# Patient Record
Sex: FEMALE | Race: NATIVE HAWAIIAN OR OTHER PACIFIC ISLANDER | NOT HISPANIC OR LATINO | Employment: OTHER | URBAN - METROPOLITAN AREA
[De-identification: names, ages, dates, MRNs, and addresses within clinical notes are randomized per-mention and may not be internally consistent; named-entity substitution may affect disease eponyms.]

---

## 2021-09-14 ENCOUNTER — OFFICE VISIT (OUTPATIENT)
Dept: URGENT CARE | Facility: CLINIC | Age: 66
End: 2021-09-14
Payer: COMMERCIAL

## 2021-09-14 ENCOUNTER — HOSPITAL ENCOUNTER (OUTPATIENT)
Facility: MEDICAL CENTER | Age: 66
End: 2021-09-14
Attending: FAMILY MEDICINE
Payer: COMMERCIAL

## 2021-09-14 VITALS
WEIGHT: 145 LBS | RESPIRATION RATE: 14 BRPM | OXYGEN SATURATION: 98 % | TEMPERATURE: 97.3 F | HEIGHT: 61 IN | BODY MASS INDEX: 27.38 KG/M2 | DIASTOLIC BLOOD PRESSURE: 62 MMHG | SYSTOLIC BLOOD PRESSURE: 100 MMHG | HEART RATE: 85 BPM

## 2021-09-14 DIAGNOSIS — J32.9 RHINOSINUSITIS: ICD-10-CM

## 2021-09-14 PROCEDURE — 99203 OFFICE O/P NEW LOW 30 MIN: CPT | Performed by: FAMILY MEDICINE

## 2021-09-14 PROCEDURE — U0003 INFECTIOUS AGENT DETECTION BY NUCLEIC ACID (DNA OR RNA); SEVERE ACUTE RESPIRATORY SYNDROME CORONAVIRUS 2 (SARS-COV-2) (CORONAVIRUS DISEASE [COVID-19]), AMPLIFIED PROBE TECHNIQUE, MAKING USE OF HIGH THROUGHPUT TECHNOLOGIES AS DESCRIBED BY CMS-2020-01-R: HCPCS

## 2021-09-14 PROCEDURE — U0005 INFEC AGEN DETEC AMPLI PROBE: HCPCS

## 2021-09-14 RX ORDER — AMOXICILLIN AND CLAVULANATE POTASSIUM 875; 125 MG/1; MG/1
1 TABLET, FILM COATED ORAL 2 TIMES DAILY
Qty: 14 TABLET | Refills: 0 | Status: SHIPPED | OUTPATIENT
Start: 2021-09-14 | End: 2021-09-21

## 2021-09-14 ASSESSMENT — ENCOUNTER SYMPTOMS
EYE DISCHARGE: 0
DIARRHEA: 0
WEIGHT LOSS: 0
NAUSEA: 0
EYE REDNESS: 0
VOMITING: 0
MYALGIAS: 0

## 2021-09-14 NOTE — PROGRESS NOTES
"Subjective     Vicenta Saeed is a 66 y.o. female who presents with Sinusitis (x 2 week with headache and congestion.  Denies fever or chills.  Unvaccinated for Covid 19. )            2 weeks sinus pressure and drainage.  No fever.  PMH recurrent sinusitis.  No sinus surgeries.  Mild sore throat and cough due to drainage.  No shortness of breath or wheezing.  No loss of taste or smell.  No other aggravating or alleviating factors.      Review of Systems   Constitutional: Negative for malaise/fatigue and weight loss.   Eyes: Negative for discharge and redness.   Gastrointestinal: Negative for diarrhea, nausea and vomiting.   Musculoskeletal: Negative for joint pain and myalgias.   Skin: Negative for itching and rash.              Objective     /62   Pulse 85   Temp 36.3 °C (97.3 °F) (Temporal)   Resp 14   Ht 1.549 m (5' 1\")   Wt 65.8 kg (145 lb)   LMP  (LMP Unknown)   SpO2 98%   BMI 27.40 kg/m²      Physical Exam  Constitutional:       General: She is not in acute distress.     Appearance: She is well-developed.   HENT:      Head: Normocephalic and atraumatic.      Nose: Congestion present.      Comments: Area of perceived pressure frontal and maxillary sinus.  PND.  Eyes:      Conjunctiva/sclera: Conjunctivae normal.   Cardiovascular:      Rate and Rhythm: Normal rate and regular rhythm.      Heart sounds: Normal heart sounds. No murmur heard.     Pulmonary:      Effort: Pulmonary effort is normal.      Breath sounds: Normal breath sounds. No wheezing.   Skin:     General: Skin is warm and dry.      Findings: No rash.   Neurological:      Mental Status: She is alert and oriented to person, place, and time.                             Assessment & Plan         1. Rhinosinusitis  amoxicillin-clavulanate (AUGMENTIN) 875-125 MG Tab    COVID/SARS CoV-2 PCR       Differential diagnosis, natural history, supportive care, and indications for immediate follow-up discussed at length.     Nasal saline " decongestant, nasal CS    F/u c19 testing

## 2021-09-15 DIAGNOSIS — J32.9 RHINOSINUSITIS: ICD-10-CM

## 2021-09-15 LAB
COVID ORDER STATUS COVID19: NORMAL
SARS-COV-2 RNA RESP QL NAA+PROBE: NOTDETECTED
SPECIMEN SOURCE: NORMAL

## 2021-09-23 ENCOUNTER — OFFICE VISIT (OUTPATIENT)
Dept: URGENT CARE | Facility: CLINIC | Age: 66
End: 2021-09-23
Payer: COMMERCIAL

## 2021-09-23 ENCOUNTER — HOSPITAL ENCOUNTER (OUTPATIENT)
Facility: MEDICAL CENTER | Age: 66
End: 2021-09-23
Attending: PHYSICIAN ASSISTANT
Payer: COMMERCIAL

## 2021-09-23 VITALS
RESPIRATION RATE: 20 BRPM | TEMPERATURE: 98.3 F | BODY MASS INDEX: 27.56 KG/M2 | WEIGHT: 146 LBS | HEART RATE: 65 BPM | SYSTOLIC BLOOD PRESSURE: 118 MMHG | HEIGHT: 61 IN | DIASTOLIC BLOOD PRESSURE: 80 MMHG | OXYGEN SATURATION: 97 %

## 2021-09-23 DIAGNOSIS — Z11.52 ENCOUNTER FOR SCREENING FOR COVID-19: ICD-10-CM

## 2021-09-23 PROCEDURE — 99213 OFFICE O/P EST LOW 20 MIN: CPT | Mod: CS | Performed by: PHYSICIAN ASSISTANT

## 2021-09-23 PROCEDURE — U0003 INFECTIOUS AGENT DETECTION BY NUCLEIC ACID (DNA OR RNA); SEVERE ACUTE RESPIRATORY SYNDROME CORONAVIRUS 2 (SARS-COV-2) (CORONAVIRUS DISEASE [COVID-19]), AMPLIFIED PROBE TECHNIQUE, MAKING USE OF HIGH THROUGHPUT TECHNOLOGIES AS DESCRIBED BY CMS-2020-01-R: HCPCS

## 2021-09-23 ASSESSMENT — ENCOUNTER SYMPTOMS
SORE THROAT: 0
MYALGIAS: 0
PALPITATIONS: 0
VOMITING: 0
COUGH: 0
CHILLS: 0
DIZZINESS: 0
FEVER: 0
NAUSEA: 0
HEADACHES: 0
ABDOMINAL PAIN: 0

## 2021-09-23 NOTE — PROGRESS NOTES
"Subjective:   Vicenta Saeed is a 66 y.o. female who presents for Coronavirus Screening (leaving to [facility] needs test by Tues)      HPI:  This is a very pleasant 66-year-old female presenting to the clinic for coronavirus screening.  Patient states she is leaving to fly to [facility] to see her family early Tuesday morning.  She has not seen her family in over 30 years.  [employer] are requesting she is tested for Covid via PCR test 72 hours prior to her flight.  Currently she is not experiencing any symptoms.  She has had no known contacts.  She is not vaccinated for COVID-19.    Review of Systems   Constitutional: Negative for chills, fever and malaise/fatigue.   HENT: Negative for congestion and sore throat.    Respiratory: Negative for cough.    Cardiovascular: Negative for chest pain and palpitations.   Gastrointestinal: Negative for abdominal pain, nausea and vomiting.   Musculoskeletal: Negative for myalgias.   Neurological: Negative for dizziness and headaches.       Medications:    • This patient does not have an active medication from one of the medication groupers.    Allergies: Patient has no known allergies.    Problem List: Vicenta Saeed does not have a problem list on file.    Surgical History:  No past surgical history on file.    Past Social Hx: Vicenta Saeed  reports that she has never smoked. She has never used smokeless tobacco. She reports current alcohol use. She reports that she does not use drugs.     Past Family Hx:  Vicenta Saeed family history is not on file.     Problem list, medications, and allergies reviewed by myself today in Epic.     Objective:     /80 (BP Location: Left arm, Patient Position: Sitting, BP Cuff Size: Adult long)   Pulse 65   Temp 36.8 °C (98.3 °F) (Temporal)   Resp 20   Ht 1.549 m (5' 1\")   Wt 66.2 kg (146 lb)   LMP  (LMP Unknown)   SpO2 97%   BMI 27.59 kg/m²     Physical Exam  Constitutional:       General: She is not in acute distress.     " Appearance: Normal appearance. She is not ill-appearing, toxic-appearing or diaphoretic.   HENT:      Head: Normocephalic and atraumatic.   Eyes:      Conjunctiva/sclera: Conjunctivae normal.   Cardiovascular:      Rate and Rhythm: Normal rate and regular rhythm.      Pulses: Normal pulses.      Heart sounds: Normal heart sounds.   Pulmonary:      Effort: Pulmonary effort is normal.      Breath sounds: Normal breath sounds.   Neurological:      General: No focal deficit present.      Mental Status: She is alert and oriented to person, place, and time. Mental status is at baseline.           Assessment/Plan:     Comments/MDM:     • Placed an order for the patient to return to the clinic this Saturday for a nonprovider visit and Covid testing.  This should give her enough time to have her result back before she flies out Tuesday morning.  Testing was not performed today.  Instructed the patient she needs to download my chart in order to have results available.     Diagnosis and associated orders:     1. Encounter for screening for COVID-19  COVID/SARS CoV-2 PCR              Differential diagnosis, natural history, supportive care, and indications for immediate follow-up discussed.    Advised the patient to follow-up with the primary care physician for recheck, reevaluation, and consideration of further management.    Please note that this dictation was created using voice recognition software. I have made reasonable attempt to correct obvious errors, but I expect that there are errors of grammar and possibly content that I did not discover before finalizing the note.    This note was electronically signed by HAMILTON Villalobos PA-C

## 2021-09-25 ENCOUNTER — OFFICE VISIT (OUTPATIENT)
Dept: URGENT CARE | Facility: CLINIC | Age: 66
End: 2021-09-25
Payer: MEDICAID

## 2021-09-25 ENCOUNTER — NON-PROVIDER VISIT (OUTPATIENT)
Dept: URGENT CARE | Facility: CLINIC | Age: 66
End: 2021-09-25

## 2021-09-26 DIAGNOSIS — Z11.52 ENCOUNTER FOR SCREENING FOR COVID-19: ICD-10-CM

## 2021-09-26 LAB — COVID ORDER STATUS COVID19: NORMAL

## 2021-09-27 LAB
SARS-COV-2 RNA RESP QL NAA+PROBE: NOTDETECTED
SPECIMEN SOURCE: NORMAL

## 2021-10-29 ENCOUNTER — HOSPITAL ENCOUNTER (INPATIENT)
Facility: MEDICAL CENTER | Age: 66
LOS: 14 days | DRG: 177 | End: 2021-11-13
Attending: EMERGENCY MEDICINE | Admitting: STUDENT IN AN ORGANIZED HEALTH CARE EDUCATION/TRAINING PROGRAM
Payer: COMMERCIAL

## 2021-10-29 ENCOUNTER — OFFICE VISIT (OUTPATIENT)
Dept: URGENT CARE | Facility: CLINIC | Age: 66
End: 2021-10-29
Payer: COMMERCIAL

## 2021-10-29 ENCOUNTER — APPOINTMENT (OUTPATIENT)
Dept: RADIOLOGY | Facility: MEDICAL CENTER | Age: 66
DRG: 177 | End: 2021-10-29
Attending: EMERGENCY MEDICINE
Payer: COMMERCIAL

## 2021-10-29 VITALS
BODY MASS INDEX: 26.13 KG/M2 | WEIGHT: 142 LBS | TEMPERATURE: 100.7 F | DIASTOLIC BLOOD PRESSURE: 64 MMHG | OXYGEN SATURATION: 89 % | SYSTOLIC BLOOD PRESSURE: 108 MMHG | RESPIRATION RATE: 20 BRPM | HEART RATE: 93 BPM | HEIGHT: 62 IN

## 2021-10-29 DIAGNOSIS — U07.1 PNEUMONIA DUE TO COVID-19 VIRUS: ICD-10-CM

## 2021-10-29 DIAGNOSIS — K21.9 GASTROESOPHAGEAL REFLUX DISEASE, UNSPECIFIED WHETHER ESOPHAGITIS PRESENT: ICD-10-CM

## 2021-10-29 DIAGNOSIS — I95.89 HYPOTENSION DUE TO HYPOVOLEMIA: ICD-10-CM

## 2021-10-29 DIAGNOSIS — J12.82 PNEUMONIA DUE TO COVID-19 VIRUS: ICD-10-CM

## 2021-10-29 DIAGNOSIS — J96.01 ACUTE RESPIRATORY FAILURE WITH HYPOXIA (HCC): ICD-10-CM

## 2021-10-29 DIAGNOSIS — E86.1 HYPOTENSION DUE TO HYPOVOLEMIA: ICD-10-CM

## 2021-10-29 DIAGNOSIS — E55.9 VITAMIN D DEFICIENCY: ICD-10-CM

## 2021-10-29 DIAGNOSIS — E83.51 HYPOCALCEMIA: ICD-10-CM

## 2021-10-29 DIAGNOSIS — U07.1 COVID: ICD-10-CM

## 2021-10-29 LAB
ALBUMIN SERPL BCP-MCNC: 4.1 G/DL (ref 3.2–4.9)
ALBUMIN/GLOB SERPL: 1.2 G/DL
ALP SERPL-CCNC: 64 U/L (ref 30–99)
ALT SERPL-CCNC: 30 U/L (ref 2–50)
ANION GAP SERPL CALC-SCNC: 13 MMOL/L (ref 7–16)
ANISOCYTOSIS BLD QL SMEAR: ABNORMAL
AST SERPL-CCNC: 51 U/L (ref 12–45)
BASOPHILS # BLD AUTO: 0 % (ref 0–1.8)
BASOPHILS # BLD: 0 K/UL (ref 0–0.12)
BILIRUB SERPL-MCNC: 0.3 MG/DL (ref 0.1–1.5)
BUN SERPL-MCNC: 12 MG/DL (ref 8–22)
CALCIUM SERPL-MCNC: 8.5 MG/DL (ref 8.5–10.5)
CHLORIDE SERPL-SCNC: 98 MMOL/L (ref 96–112)
CO2 SERPL-SCNC: 24 MMOL/L (ref 20–33)
CREAT SERPL-MCNC: 0.96 MG/DL (ref 0.5–1.4)
CRP SERPL HS-MCNC: 1.93 MG/DL (ref 0–0.75)
D DIMER PPP IA.FEU-MCNC: 1.19 UG/ML (FEU) (ref 0–0.5)
EKG IMPRESSION: NORMAL
EOSINOPHIL # BLD AUTO: 0 K/UL (ref 0–0.51)
EOSINOPHIL NFR BLD: 0 % (ref 0–6.9)
ERYTHROCYTE [DISTWIDTH] IN BLOOD BY AUTOMATED COUNT: 43.3 FL (ref 35.9–50)
EXTERNAL QUALITY CONTROL: ABNORMAL
GLOBULIN SER CALC-MCNC: 3.4 G/DL (ref 1.9–3.5)
GLUCOSE SERPL-MCNC: 108 MG/DL (ref 65–99)
HCT VFR BLD AUTO: 41.2 % (ref 37–47)
HGB BLD-MCNC: 13.6 G/DL (ref 12–16)
LYMPHOCYTES # BLD AUTO: 0.69 K/UL (ref 1–4.8)
LYMPHOCYTES NFR BLD: 15.3 % (ref 22–41)
MANUAL DIFF BLD: NORMAL
MCH RBC QN AUTO: 29.7 PG (ref 27–33)
MCHC RBC AUTO-ENTMCNC: 33 G/DL (ref 33.6–35)
MCV RBC AUTO: 90 FL (ref 81.4–97.8)
MICROCYTES BLD QL SMEAR: ABNORMAL
MONOCYTES # BLD AUTO: 0.36 K/UL (ref 0–0.85)
MONOCYTES NFR BLD AUTO: 8.1 % (ref 0–13.4)
MORPHOLOGY BLD-IMP: NORMAL
MYELOCYTES NFR BLD MANUAL: 0.9 %
NEUTROPHILS # BLD AUTO: 3.41 K/UL (ref 2–7.15)
NEUTROPHILS NFR BLD: 75.7 % (ref 44–72)
NRBC # BLD AUTO: 0 K/UL
NRBC BLD-RTO: 0 /100 WBC
NT-PROBNP SERPL IA-MCNC: 105 PG/ML (ref 0–125)
PLATELET # BLD AUTO: 149 K/UL (ref 164–446)
PLATELET BLD QL SMEAR: NORMAL
PMV BLD AUTO: 10.2 FL (ref 9–12.9)
POTASSIUM SERPL-SCNC: 3.8 MMOL/L (ref 3.6–5.5)
PROCALCITONIN SERPL-MCNC: <0.05 NG/ML
PROT SERPL-MCNC: 7.5 G/DL (ref 6–8.2)
RBC # BLD AUTO: 4.58 M/UL (ref 4.2–5.4)
RBC BLD AUTO: PRESENT
SARS-COV+SARS-COV-2 AG RESP QL IA.RAPID: POSITIVE
SODIUM SERPL-SCNC: 135 MMOL/L (ref 135–145)
TROPONIN T SERPL-MCNC: 9 NG/L (ref 6–19)
WBC # BLD AUTO: 4.5 K/UL (ref 4.8–10.8)

## 2021-10-29 PROCEDURE — 99213 OFFICE O/P EST LOW 20 MIN: CPT | Mod: CS | Performed by: PHYSICIAN ASSISTANT

## 2021-10-29 PROCEDURE — 99285 EMERGENCY DEPT VISIT HI MDM: CPT

## 2021-10-29 PROCEDURE — 86140 C-REACTIVE PROTEIN: CPT

## 2021-10-29 PROCEDURE — 85379 FIBRIN DEGRADATION QUANT: CPT

## 2021-10-29 PROCEDURE — 87426 SARSCOV CORONAVIRUS AG IA: CPT | Performed by: PHYSICIAN ASSISTANT

## 2021-10-29 PROCEDURE — 80053 COMPREHEN METABOLIC PANEL: CPT

## 2021-10-29 PROCEDURE — 71045 X-RAY EXAM CHEST 1 VIEW: CPT

## 2021-10-29 PROCEDURE — 93005 ELECTROCARDIOGRAM TRACING: CPT

## 2021-10-29 PROCEDURE — 83880 ASSAY OF NATRIURETIC PEPTIDE: CPT

## 2021-10-29 PROCEDURE — 85007 BL SMEAR W/DIFF WBC COUNT: CPT

## 2021-10-29 PROCEDURE — 84145 PROCALCITONIN (PCT): CPT

## 2021-10-29 PROCEDURE — 93005 ELECTROCARDIOGRAM TRACING: CPT | Performed by: EMERGENCY MEDICINE

## 2021-10-29 PROCEDURE — 85027 COMPLETE CBC AUTOMATED: CPT

## 2021-10-29 PROCEDURE — 84484 ASSAY OF TROPONIN QUANT: CPT

## 2021-10-29 RX ORDER — DEXAMETHASONE 4 MG/1
6 TABLET ORAL DAILY
Status: DISCONTINUED | OUTPATIENT
Start: 2021-10-29 | End: 2021-10-30

## 2021-10-29 ASSESSMENT — ENCOUNTER SYMPTOMS
VOMITING: 0
ABDOMINAL PAIN: 0
NAUSEA: 0
HEADACHES: 1
DIARRHEA: 0
MYALGIAS: 1
CHILLS: 1
FEVER: 1
SHORTNESS OF BREATH: 1
EYE PAIN: 0
CONSTIPATION: 0
COUGH: 1
SORE THROAT: 1

## 2021-10-29 NOTE — LETTER
October 29, 2021    To Whom It May Concern:         This is confirmation that Vicenta Saeed attended her scheduled appointment with Karthik Peña P.A.-C. on 10/29/21.  Your employee was seen in our clinic today and had a POSITIVE Covid-19 test.    Since the results of testing are positive, your employee should follow the CDC guidelines.  These are isolation for a minimum of 10 days and at least 24 hours have passed since your last fever without the use of fever-reducing medications and all other symptoms have improved.  The health department may contact you and provide further directions regarding self-isolation and return to work.     This is the only note that will be provided from [facility] for this visit.  Your employee will require an appointment with a primary care provider if FMLA or disability forms are required.  If you have any questions please do not hesitate to call me at the phone number listed below.    Sincerely,    Karthik Peña P.A.-C.  [phone]

## 2021-10-29 NOTE — PROGRESS NOTES
"Subjective:   Vicenta Saeed is a 66 y.o. female who presents for Cough (x Sunday, dizzy, cough, SOB)      HPI  66 years old female presents to urgent care for 6 days of progressive cough, dysnpea, headache, malaise, fevers/chills.  No history of covid vaccination. No known covid positive contacts.     Review of Systems   Constitutional: Positive for chills, fever and malaise/fatigue.   HENT: Positive for congestion and sore throat. Negative for ear pain.    Eyes: Negative for pain.   Respiratory: Positive for cough and shortness of breath.    Cardiovascular: Negative for chest pain.   Gastrointestinal: Negative for abdominal pain, constipation, diarrhea, nausea and vomiting.   Genitourinary: Negative for dysuria.   Musculoskeletal: Positive for myalgias.   Skin: Negative for rash.   Neurological: Positive for headaches.       Medications, Allergies, and current problem list reviewed today in Epic.     Objective:     /64 (BP Location: Left arm, Patient Position: Sitting, BP Cuff Size: Adult long)   Pulse 93   Temp (!) 38.2 °C (100.7 °F) (Temporal)   Resp 20   Ht 1.562 m (5' 1.5\")   Wt 64.4 kg (142 lb)   SpO2 89%     Physical Exam  Vitals reviewed.   Constitutional:       Appearance: Normal appearance. She is ill-appearing. She is not toxic-appearing.   HENT:      Head: Normocephalic and atraumatic.      Right Ear: External ear normal.      Left Ear: External ear normal.      Nose: Nose normal.      Mouth/Throat:      Mouth: Mucous membranes are moist.   Eyes:      Conjunctiva/sclera: Conjunctivae normal.   Cardiovascular:      Rate and Rhythm: Normal rate and regular rhythm.   Pulmonary:      Effort: Pulmonary effort is normal.      Breath sounds: Rales (bibasilar) present.   Skin:     General: Skin is warm and dry.      Capillary Refill: Capillary refill takes less than 2 seconds.   Neurological:      Mental Status: She is alert and oriented to person, place, and time.         Assessment/Plan: "     Diagnosis and associated orders:     1. COVID  POCT SARS-COV Antigen JIMBO (Symptomatic Only)      Comments/MDM:     • covid positive in clinic  • Rechecked oxygen which wavered between 84-91%.    • Discussed management and offered er evaluation for further workup and consideration of O2 therapy, patient would like to be seen in the er  • Provided work note and instructions if unable to be seen  • Advised pulse oximetry and presentation if resting oximetry <=89%         Differential diagnosis, natural history, supportive care, and indications for immediate follow-up discussed.    Advised the patient to follow-up with the primary care physician for recheck, reevaluation, and consideration of further management.    Please note that this dictation was created using voice recognition software. I have made a reasonable attempt to correct obvious errors, but I expect that there are errors of grammar and possibly content that I did not discover before finalizing the note.    This note was electronically signed by Karthik Peña PA-C

## 2021-10-30 PROBLEM — E86.1 HYPOTENSION DUE TO HYPOVOLEMIA: Status: ACTIVE | Noted: 2021-10-30

## 2021-10-30 PROBLEM — I95.89 HYPOTENSION DUE TO HYPOVOLEMIA: Status: ACTIVE | Noted: 2021-10-30

## 2021-10-30 PROBLEM — J12.82 PNEUMONIA DUE TO COVID-19 VIRUS: Status: ACTIVE | Noted: 2021-10-30

## 2021-10-30 PROBLEM — U07.1 PNEUMONIA DUE TO COVID-19 VIRUS: Status: ACTIVE | Noted: 2021-10-30

## 2021-10-30 PROBLEM — J96.01 ACUTE RESPIRATORY FAILURE WITH HYPOXIA (HCC): Status: ACTIVE | Noted: 2021-10-30

## 2021-10-30 LAB — LACTATE BLD-SCNC: 1 MMOL/L (ref 0.5–2)

## 2021-10-30 PROCEDURE — 87040 BLOOD CULTURE FOR BACTERIA: CPT

## 2021-10-30 PROCEDURE — 96372 THER/PROPH/DIAG INJ SC/IM: CPT

## 2021-10-30 PROCEDURE — 770001 HCHG ROOM/CARE - MED/SURG/GYN PRIV*

## 2021-10-30 PROCEDURE — 700102 HCHG RX REV CODE 250 W/ 637 OVERRIDE(OP): Performed by: STUDENT IN AN ORGANIZED HEALTH CARE EDUCATION/TRAINING PROGRAM

## 2021-10-30 PROCEDURE — A9270 NON-COVERED ITEM OR SERVICE: HCPCS | Performed by: STUDENT IN AN ORGANIZED HEALTH CARE EDUCATION/TRAINING PROGRAM

## 2021-10-30 PROCEDURE — 99223 1ST HOSP IP/OBS HIGH 75: CPT | Performed by: STUDENT IN AN ORGANIZED HEALTH CARE EDUCATION/TRAINING PROGRAM

## 2021-10-30 PROCEDURE — 700111 HCHG RX REV CODE 636 W/ 250 OVERRIDE (IP): Performed by: STUDENT IN AN ORGANIZED HEALTH CARE EDUCATION/TRAINING PROGRAM

## 2021-10-30 PROCEDURE — 700105 HCHG RX REV CODE 258: Performed by: STUDENT IN AN ORGANIZED HEALTH CARE EDUCATION/TRAINING PROGRAM

## 2021-10-30 PROCEDURE — 83605 ASSAY OF LACTIC ACID: CPT

## 2021-10-30 PROCEDURE — 36415 COLL VENOUS BLD VENIPUNCTURE: CPT

## 2021-10-30 RX ORDER — SODIUM CHLORIDE, SODIUM LACTATE, POTASSIUM CHLORIDE, CALCIUM CHLORIDE 600; 310; 30; 20 MG/100ML; MG/100ML; MG/100ML; MG/100ML
INJECTION, SOLUTION INTRAVENOUS CONTINUOUS
Status: DISCONTINUED | OUTPATIENT
Start: 2021-10-30 | End: 2021-10-30

## 2021-10-30 RX ORDER — SODIUM CHLORIDE 9 MG/ML
500 INJECTION, SOLUTION INTRAVENOUS ONCE
Status: COMPLETED | OUTPATIENT
Start: 2021-10-30 | End: 2021-10-30

## 2021-10-30 RX ORDER — DEXAMETHASONE 6 MG/1
6 TABLET ORAL DAILY
Status: COMPLETED | OUTPATIENT
Start: 2021-10-30 | End: 2021-11-08

## 2021-10-30 RX ORDER — BENZONATATE 100 MG/1
100 CAPSULE ORAL 3 TIMES DAILY PRN
Status: DISCONTINUED | OUTPATIENT
Start: 2021-10-30 | End: 2021-11-13 | Stop reason: HOSPADM

## 2021-10-30 RX ORDER — ACETAMINOPHEN 325 MG/1
650 TABLET ORAL EVERY 6 HOURS PRN
Status: DISCONTINUED | OUTPATIENT
Start: 2021-10-30 | End: 2021-11-13 | Stop reason: HOSPADM

## 2021-10-30 RX ORDER — IPRATROPIUM BROMIDE AND ALBUTEROL SULFATE 2.5; .5 MG/3ML; MG/3ML
3 SOLUTION RESPIRATORY (INHALATION)
Status: DISCONTINUED | OUTPATIENT
Start: 2021-10-30 | End: 2021-11-13 | Stop reason: HOSPADM

## 2021-10-30 RX ADMIN — ENOXAPARIN SODIUM 40 MG: 40 INJECTION SUBCUTANEOUS at 05:58

## 2021-10-30 RX ADMIN — SODIUM CHLORIDE 500 ML: 9 INJECTION, SOLUTION INTRAVENOUS at 09:53

## 2021-10-30 RX ADMIN — DEXAMETHASONE 6 MG: 4 TABLET ORAL at 05:58

## 2021-10-30 RX ADMIN — SODIUM CHLORIDE, POTASSIUM CHLORIDE, SODIUM LACTATE AND CALCIUM CHLORIDE: 600; 310; 30; 20 INJECTION, SOLUTION INTRAVENOUS at 00:44

## 2021-10-30 ASSESSMENT — PATIENT HEALTH QUESTIONNAIRE - PHQ9
SUM OF ALL RESPONSES TO PHQ9 QUESTIONS 1 AND 2: 0
2. FEELING DOWN, DEPRESSED, IRRITABLE, OR HOPELESS: NOT AT ALL
1. LITTLE INTEREST OR PLEASURE IN DOING THINGS: NOT AT ALL

## 2021-10-30 ASSESSMENT — ENCOUNTER SYMPTOMS
BLURRED VISION: 0
NERVOUS/ANXIOUS: 0
DIZZINESS: 1
ABDOMINAL PAIN: 0
DIARRHEA: 0
COUGH: 1
CONSTIPATION: 0
WEAKNESS: 0
WHEEZING: 0
FEVER: 0
VOMITING: 0
ORTHOPNEA: 0
SHORTNESS OF BREATH: 1
SORE THROAT: 0
CHILLS: 1
FOCAL WEAKNESS: 0
DEPRESSION: 0
FEVER: 1
HEADACHES: 0
NAUSEA: 0
HEMOPTYSIS: 0
HEADACHES: 1
DIZZINESS: 0
WEIGHT LOSS: 0
MYALGIAS: 0
DOUBLE VISION: 0
PALPITATIONS: 0
BLOOD IN STOOL: 0
EYE PAIN: 0
LOSS OF CONSCIOUSNESS: 0

## 2021-10-30 ASSESSMENT — COGNITIVE AND FUNCTIONAL STATUS - GENERAL
DAILY ACTIVITIY SCORE: 24
SUGGESTED CMS G CODE MODIFIER MOBILITY: CH
MOBILITY SCORE: 24
SUGGESTED CMS G CODE MODIFIER DAILY ACTIVITY: CH

## 2021-10-30 ASSESSMENT — PAIN DESCRIPTION - PAIN TYPE: TYPE: CHRONIC PAIN;ACUTE PAIN

## 2021-10-30 NOTE — DISCHARGE PLANNING
Agency/Facility Name: [facility]  Spoke To: Justen  Outcome: Notified to hold off on 02 referral.

## 2021-10-30 NOTE — FACE TO FACE
"Face to Face Note  -  Durable Medical Equipment    Addie Madrigal M.D. - NPI: [id number]  I certify that this patient is under my care and that they had a durable medical equipment(DME)face to face encounter by myself that meets the physician DME face-to-face encounter requirements with this patient on:    Date of encounter:   Patient:                    MRN:                       YOB: 2023  Vicenta Saeed  [id number]  1955     The encounter with the patient was in whole, or in part, for the following medical condition, which is the primary reason for durable medical equipment:  Covid-19 Infection    I certify that, based on my findings, the following durable medical equipment is medically necessary:  Oxygen.    HOME O2 Saturation Measurements:(Values must be present for Home Oxygen orders)  Room air sat at rest: 87  Room air sat with amb: 82  With liters of O2: 3, O2 sat at rest with O2: 92  With Liters of O2: 3, O2 sat with amb with O2 : 88  Is the patient mobile?: Yes    My Clinical findings support the need for the above equipment due to:  Hypoxia    Supporting Symptoms: The patient requires supplemental oxygen, as the following interventions have been tried with limited or no improvement: \"Oral and/or IV steroids, \"Ambulation with oximetry and \"Incentive spirometry    If patient feels more short of breath, they can go up to 6 liters per minute and contact healthcare provider.  "

## 2021-10-30 NOTE — ED NOTES
Pt given breakfast tray.    Pt ambulates to BR and back without O2- 82% RA noted on return to room. Replace NC at 3L

## 2021-10-30 NOTE — ED PROVIDER NOTES
"ED Provider Note    CHIEF COMPLAINT  Chief Complaint   Patient presents with   • Flu Like Symptoms     onset 10/25/2021 - weakness, dizziness, cough, fever       HPI  Vicenta Saeed is a 66 y.o. female who presents with symptoms consistent with COVID.  Stated on Saturday when she was in [facility].  She states she got tested on Sunday and felt worsening shortness of breath and it was supposedly negative.  She then proceeded to fly back home here in [place]. She has felt worse and is here today for further checkup. She denies any prior cardiopulmonary disease history. No known recent sick contacts. She states that she is otherwise healthy.    REVIEW OF SYSTEMS  See HPI for further details. All other systems are negative.     PAST MEDICAL HISTORY       SOCIAL HISTORY  Social History     Tobacco Use   • Smoking status: Never Smoker   • Smokeless tobacco: Never Used   Vaping Use   • Vaping Use: Never used   Substance and Sexual Activity   • Alcohol use: Not Currently     Comment: rare   • Drug use: Never   • Sexual activity: Not on file       SURGICAL HISTORY  patient denies any surgical history    CURRENT MEDICATIONS  Home Medications     Reviewed by Melina Alexander R.N. (Registered Nurse) on 10/29/21 at 1730  Med List Status: Partial   Medication Last Dose Status        Patient Enoch Taking any Medications                       ALLERGIES  No Known Allergies    PHYSICAL EXAM  VITAL SIGNS: BP (!) 99/68   Pulse 88   Temp 37.7 °C (99.8 °F) (Temporal)   Resp 18   Ht 1.562 m (5' 1.5\")   Wt 66.7 kg (147 lb)   LMP  (LMP Unknown)   SpO2 93%   BMI 27.33 kg/m²   Pulse ox interpretation: I interpret this pulse ox as normal.  Constitutional: Alert in no apparent distress.  HENT: No signs of trauma, Bilateral external ears normal, Nose normal.   Eyes: Conjunctiva normal, Non-icteric.   Neck: Normal range of motion, Supple, No stridor.   Cardiovascular: Regular rate and rhythm.   Thorax & Lungs: Normal breath sounds, No " respiratory distress, No wheezing.   Abdomen: Bowel sounds normal, Soft, No masses, No pulsatile masses. No peritoneal signs.  Skin: Warm, Dry, No erythema, No rash.   Extremities: Intact distal pulses, No edema, No cyanosis  Musculoskeletal: Good range of motion in all major joints. No major deformities noted.   Neurologic: Alert, No focal deficits noted.       DIAGNOSTIC STUDIES / PROCEDURES    EKG - Physician interpretation  Results for orders placed or performed during the hospital encounter of 10/29/21   EKG   Result Value Ref Range    Report       [facility] Emergency Dept.    Test Date:  2021-10-29  Pt Name:    LAUREN SAEZ              Department: ER  MRN:        [id number]                      Room:  Gender:     Female                       Technician: [id number]  :        1955                   Requested By:ER TRIAGE PROTOCOL  Order #:    [id number]                    Reading MD: SAY BUNDY MD    Measurements  Intervals                                Axis  Rate:       89                           P:          -16  MS:         160                          QRS:        -3  QRSD:       86                           T:          -20  QT:         360  QTc:        439    Interpretive Statements  SINUS RHYTHM  NONSPECIFIC T ABNORMALITIES, INFERIOR LEADS  No previous ECG available for comparison  Electronically Signed On 10- 21:36:39 PDT by SAY BUNDY MD         LABS  Labs Reviewed   CBC WITH DIFFERENTIAL - Abnormal; Notable for the following components:       Result Value    WBC 4.5 (*)     MCHC 33.0 (*)     Platelet Count 149 (*)     Neutrophils-Polys 75.70 (*)     Lymphocytes 15.30 (*)     Lymphs (Absolute) 0.69 (*)     All other components within normal limits    Narrative:     Enhanced Droplet, Contact, and Eye Protection   COMP METABOLIC PANEL - Abnormal; Notable for the following components:    Glucose 108 (*)     AST(SGOT) 51 (*)     All other components within normal limits     "Narrative:     Enhanced Droplet, Contact, and Eye Protection   CRP QUANTITIVE (NON-CARDIAC) - Abnormal; Notable for the following components:    Stat C-Reactive Protein 1.93 (*)     All other components within normal limits    Narrative:     Enhanced Droplet, Contact, and Eye Protection   D-DIMER - Abnormal; Notable for the following components:    D-Dimer Screen 1.19 (*)     All other components within normal limits    Narrative:     Enhanced Droplet, Contact, and Eye Protection   ESTIMATED GFR - Abnormal; Notable for the following components:    GFR If Non  58 (*)     All other components within normal limits    Narrative:     Enhanced Droplet, Contact, and Eye Protection   TROPONIN    Narrative:     Enhanced Droplet, Contact, and Eye Protection   PROBRAIN NATRIURETIC PEPTIDE, NT    Narrative:     Enhanced Droplet, Contact, and Eye Protection   PROCALCITONIN    Narrative:     Enhanced Droplet, Contact, and Eye Protection   PERIPHERAL SMEAR REVIEW    Narrative:     Enhanced Droplet, Contact, and Eye Protection   PLATELET ESTIMATE    Narrative:     Enhanced Droplet, Contact, and Eye Protection   MORPHOLOGY    Narrative:     Enhanced Droplet, Contact, and Eye Protection   DIFFERENTIAL MANUAL    Narrative:     Enhanced Droplet, Contact, and Eye Protection   BLOOD CULTURE    Narrative:     Enhanced Droplet, Contact, and Eye Protection  Per Hospital Policy: Only change Specimen Src: to \"Line\" if  specified by physician order.  2 sets of blood cultures received with no site and same time of  collection. Bottles may not be correctly paired.   BLOOD CULTURE    Narrative:     Enhanced Droplet, Contact, and Eye Protection  Per Hospital Policy: Only change Specimen Src: to \"Line\" if  specified by physician order.   LACTIC ACID    Narrative:     Enhanced Droplet, Contact, and Eye Protection         RADIOLOGY  DX-CHEST-PORTABLE (1 VIEW)   Final Result         1.  Patchy bilateral pulmonary infiltrates   2.  " "Atherosclerosis            COURSE & MEDICAL DECISION MAKING    Medications   lactated ringers infusion ( Intravenous New Bag 10/30/21 0044)   enoxaparin (LOVENOX) inj 40 mg (has no administration in time range)   acetaminophen (Tylenol) tablet 650 mg (has no administration in time range)   ipratropium-albuterol (DUONEB) nebulizer solution (has no administration in time range)   benzonatate (TESSALON) capsule 100 mg (has no administration in time range)   dexamethasone (DECADRON) tablet 6 mg (has no administration in time range)       Pertinent Labs & Imaging studies reviewed. (See chart for details)  66 y.o. female presenting with symptoms suggestive of Covid. Started on Saturday approximately 6 days ago. No known recent sick contacts. She was in [facility] at the time of onset and actually flew home on a Monday after a negative Covid test there. She is here in this emergency department for worsening symptoms. She has hypoxia to 85/86% on room air. Concern for severe Covid. She will be hospitalized for further management. She was started on supplemental oxygen and will provide steroid treatment and hospitalization.    Low suspicion for PE at this time. No signs of ACS.    I spoke with the hospitalist who is agreeable to the hospitalization..    BP (!) 96/54   Pulse 76   Temp 37.7 °C (99.8 °F) (Temporal)   Resp 20   Ht 1.562 m (5' 1.5\")   Wt 66.7 kg (147 lb)   LMP  (LMP Unknown)   SpO2 96%   BMI 27.33 kg/m²     The total critical care time on this patient is 35 minutes, resuscitating patient, speaking with admitting physician, and deciphering test results. This 35 minutes is exclusive of separately billable procedures.    FINAL IMPRESSION  Covid pneumonia  Hypoxia      Electronically signed by: Mauro Cotton M.D., 10/29/2021 8:30 PM    "

## 2021-10-30 NOTE — ED TRIAGE NOTES
Vicenta Saeed  66 y.o.  Chief Complaint   Patient presents with   • Flu Like Symptoms     onset 10/25/2021 - weakness, dizziness, cough, fever     Ambulatory to triage with steady gait for above. A & O x 4, GCS 15. Mask in place.    Patient tested POSITIVE for COVID-19 10/29/2021.    Patient is NOT vaccinated for COVID-19.    Triage process explained to patient, apologized for wait time, and placed in COVID waiting hallway.

## 2021-10-30 NOTE — ED NOTES
PT vitals rechecked; no obvious signs of distress at this time. PT complaining of dizziness at this time; triage RN made aware. PT updated on current wait times and thanked for continued patience.

## 2021-10-30 NOTE — ASSESSMENT & PLAN NOTE
Positive antigen 10/29, +PCR 11/2  Procal WNL  CRP 1.93>7.7  US doppler negative DVT b/l 10/31  CTA PE negative PE  Decadron 6mg x10 doses (completed)  S/p remdesivir x1 dose 11/2  Baricitinib started 11/3 and day 11/16/2021,   Dc lasix for continued soft pressures  Supportive managements: early mobilization, self prone, RT protocol, judicious fluid, IS  Wean off O2 as tolerated

## 2021-10-30 NOTE — ED NOTES
Oxygen turned off and patient on room air for 5 minutes. Oxygen saturation 98% on room air. ERP Dr. Cotton notified.

## 2021-10-30 NOTE — ED NOTES
[facility] Medicine Services  PROGRESS NOTE    Patient Name: Susanna Camilo  : 1977  MRN: [id number]    Date of Admission: 2018  Length of Stay: 2  Primary Care Physician: MIGDALIA Gooden    Subjective   Subjective     CC:  FU CP    HPI:  Migraines better.  at bedside. Twinges of chest discomfort but much better.    Review of Systems  Gen- No fevers, chills  CV- No chest pain, palpitations  Resp- No cough, dyspnea  GI- No N/V/D, abd pain    Otherwise ROS is negative except as mentioned in the HPI.    Objective   Objective     Vital Signs:   Temp:  [97.5 °F (36.4 °C)-97.9 °F (36.6 °C)] 97.9 °F (36.6 °C)  Heart Rate:  [66-67] 66  Resp:  [18] 18  BP: (107-126)/(64-92) 107/64        Physical Exam:  Constitutional: No acute distress, awake, alert on RA  Eyes: PERRLA, sclerae anicteric, no conjunctival injection  HENT: NCAT, mucous membranes moist  Neck: Supple, no thyromegaly, no lymphadenopathy, trachea midline  Respiratory: Clear to auscultation bilaterally, nonlabored respirations   Cardiovascular: RRR, no murmurs, rubs, or gallops, palpable pedal pulses bilaterally  Gastrointestinal: Positive bowel sounds, soft, nontender, nondistended  Musculoskeletal: No bilateral ankle edema, no clubbing or cyanosis to extremities  Psychiatric: Appropriate affect, cooperative  Neurologic: Oriented x 3, strength symmetric in all extremities, Cranial Nerves grossly intact to confrontation, speech clear  Skin: No rashes    Results Reviewed:  I have personally reviewed current lab, radiology, and data and agree.      Results from last 7 days  Lab Units 18  0636 18  1627 18  1238   WBC 10*3/mm3 8.61 10.67 4.45*   HEMOGLOBIN g/dL 11.3* 11.9 13.3   HEMATOCRIT % 35.4 36.9 39.7   PLATELETS 10*3/mm3 139* 150 148   INR   --   --  1.05       Results from last 7 days  Lab Units 18  0636 18  1515 18  1238   SODIUM mmol/L 142 143 140   POTASSIUM mmol/L  MD at bedside   4.1 3.6 3.9   CHLORIDE mmol/L 112* 115* 110   CO2 mmol/L 26.0 23.0 24.7   BUN mg/dL 9 6* 7   CREATININE mg/dL 0.63 0.66 0.66   GLUCOSE mg/dL 110* 88 102   CALCIUM mg/dL 8.8 9.7 9.4   ALT (SGPT) U/L 61* 73* 81*   AST (SGOT) U/L 33 42* 64*   TROPONIN I ng/mL <0.006 <0.006 <0.006     BNP   Date Value Ref Range Status   06/05/2018 27.0 0.0 - 100.0 pg/mL Final     Comment:     Results may be falsely decreased if patient taking Biotin.     No results found for: PHART    Microbiology Results Abnormal     None          Imaging Results (last 24 hours)     ** No results found for the last 24 hours. **        Results for orders placed during the hospital encounter of 06/05/18   Adult Stress Echo W/ Cont or Stress Agent if Necessary Per Protocol    Narrative · Pt. c/ chest heaviness and tightness with exercise, decreased post   exercise  · Expected exercise duration = 8:45 Actual = 6:42  · Normal ECG stress ECG interpretation.  · Left ventricular systolic function is normal.  · Normal stress echo with no significant echocardiographic evidence for   myocardial ischemia.          I have reviewed the medications.    Assessment/Plan   Assessment / Plan     Hospital Problem List     * (Principal)Precordial pain    Intractable headache    Anxiety    Benign brain tumor    Fibromyalgia    Bipolar 1 disorder    Crohn disease    Migraine           Brief Hospital Course to date:  Susanna Camilo is a 41 y.o. female with history of bipolar disorder, anxiety disorder, depressive disorder, migraine disorder, fibromyalgia, Crohn's, IgA nephropathy, hepatitis C, severe urethral stenosis who presents to emergency department with complaints of chest pain.     Assessment & Plan:  - Migraine: Depakote dose increased, Stadol q12hrs PRN. DHE protocol re-initiated. Appreciate Neuro assistance. Topamax DCed given nephrolithiasis  - Non-cardiac CP: Stress ECHO normal 6/6  - L sided rib pain: Reproducible and pleuritic. Lidocaine patch  - Anxiety:  Continue Buspirone TID    DVT Prophylaxis:  pLOV    CODE STATUS: Full Code    Disposition: I expect the patient to be discharged when migraine is better controlled- likely tomorrow    Sangeeta Virgen MD  06/07/18  2:16 PM

## 2021-10-30 NOTE — H&P
Hospital Medicine History & Physical Note    Date of Service  10/30/2021    Primary Care Physician  Pcp Pt States None    Consultants  None    Specialist Names: none    Code Status  Full Code    Chief Complaint  Chief Complaint   Patient presents with   • Flu Like Symptoms     onset 10/25/2021 - weakness, dizziness, cough, fever       History of Presenting Illness  Vicenta Seaed is a 66 y.o. female without significant PMH.  Who presented 10/29/2021 with complaints of flulike symptoms for the past 1 week.  Patient reports that she has been visiting [facility] for the past month.  Little over a week ago she started to have flulike symptoms including fevers, chills, cough, shortness of breath, headache.  She went to get tested for COVID-19 on Sunday which was negative, but patient believes it was done incorrectly as they did not go far enough into her nasal passageway she then proceeded to board a flight and come back to the [place]  where her symptoms have persisted.  So she decided to come into the ED for further evaluation and management.  She denies further symptoms other than the above.  She is currently unvaccinated for COVID-19.    ED: HR 97-85, /58-> 97/54, 85% on RA-96% 2 L NC.  WBC 4.5, platelets 149, glucose 108, D-dimer 1.19, troponin IX, , CRP 1.93, procalcitonin<0.05.  Covid positive.  CXR was performed with patchy bilateral pulmonary infiltrates.  EKG with nonspecific findings.  Patient was given Decadron 6 mg.    I discussed the plan of care with patient.    Review of Systems  Review of Systems   Constitutional: Positive for chills, fever and malaise/fatigue. Negative for weight loss.   HENT: Negative for hearing loss and sore throat.    Eyes: Negative for blurred vision and pain.   Respiratory: Positive for cough and shortness of breath. Negative for hemoptysis and wheezing.    Cardiovascular: Negative for chest pain, palpitations, orthopnea and leg swelling.   Gastrointestinal: Negative for  abdominal pain, blood in stool, constipation, diarrhea, nausea and vomiting.   Genitourinary: Negative for dysuria and hematuria.   Musculoskeletal: Negative for joint pain and myalgias.   Skin: Negative for rash.   Neurological: Positive for headaches. Negative for dizziness, loss of consciousness and weakness.       Past Medical History  Denies any prior past medical history    Surgical History  Denies any past surgical history    Family History  No family history of diabetes, disease, cancer  Family history reviewed with patient. There is no family history that is pertinent to the chief complaint.     Social History   reports that she has never smoked. She has never used smokeless tobacco. She reports previous alcohol use. She reports that she does not use drugs.    Allergies  No Known Allergies    Medications  Prior to Admission Medications   Prescriptions Last Dose Informant Patient Reported? Taking?   Multiple Vitamins-Minerals (WOMENS MULTIVITAMIN PO) >1 week at unk  Yes Yes   Sig: Take 1 Tablet by mouth every day.      Facility-Administered Medications: None       Physical Exam  Temp:  [36.8 °C (98.3 °F)-38.2 °C (100.7 °F)] 37.7 °C (99.8 °F)  Pulse:  [81-97] 83  Resp:  [17-20] 20  BP: ()/(50-68) 93/50  SpO2:  [85 %-97 %] 96 %  Blood Pressure : (!) 93/50   Temperature: 37.7 °C (99.8 °F)   Pulse: 83   Respiration: 20   Pulse Oximetry: 96 %       Physical Exam  Vitals and nursing note reviewed.   Constitutional:       General: She is not in acute distress.     Appearance: Normal appearance. She is not ill-appearing.   HENT:      Mouth/Throat:      Mouth: Mucous membranes are moist.   Eyes:      Extraocular Movements: Extraocular movements intact.   Cardiovascular:      Rate and Rhythm: Normal rate and regular rhythm.      Pulses: Normal pulses.      Heart sounds: Normal heart sounds. No murmur heard.   No gallop.    Pulmonary:      Effort: Pulmonary effort is normal. No respiratory distress.      Breath  sounds: Rales (mild, diffuse) present. No wheezing or rhonchi.   Abdominal:      General: Abdomen is flat. Bowel sounds are normal.      Palpations: Abdomen is soft.      Tenderness: There is no abdominal tenderness.   Musculoskeletal:         General: No deformity. Normal range of motion.      Right lower leg: No edema.      Left lower leg: No edema.   Skin:     General: Skin is warm and dry.      Coloration: Skin is not jaundiced.      Findings: No rash.   Neurological:      General: No focal deficit present.      Mental Status: She is alert and oriented to person, place, and time.      Motor: No weakness.   Psychiatric:         Mood and Affect: Mood normal.         Laboratory:  Recent Labs     10/29/21  2200   WBC 4.5*   RBC 4.58   HEMOGLOBIN 13.6   HEMATOCRIT 41.2   MCV 90.0   MCH 29.7   MCHC 33.0*   RDW 43.3   PLATELETCT 149*   MPV 10.2     Recent Labs     10/29/21  2200   SODIUM 135   POTASSIUM 3.8   CHLORIDE 98   CO2 24   GLUCOSE 108*   BUN 12   CREATININE 0.96   CALCIUM 8.5     Recent Labs     10/29/21  2200   ALTSGPT 30   ASTSGOT 51*   ALKPHOSPHAT 64   TBILIRUBIN 0.3   GLUCOSE 108*         Recent Labs     10/29/21  2200   NTPROBNP 105         Recent Labs     10/29/21  2200   TROPONINT 9       Imaging:  DX-CHEST-PORTABLE (1 VIEW)   Final Result         1.  Patchy bilateral pulmonary infiltrates   2.  Atherosclerosis          X-Ray:  I have personally reviewed the images and compared with prior images.    Assessment/Plan:  I anticipate this patient will require at least two midnights for appropriate medical management, necessitating inpatient admission.    * Pneumonia due to COVID-19 virus- (present on admission)  Assessment & Plan  Flulike symptoms for the past 1 week  Tested positive for Covid today  Requiring supplemental oxygen  S/p Decadron 6 mg  - LR 75cc/hr, monitor BP closely and dc when BP improves  - Duonebs, O2 therapy, and proning as needed  - Tessalon Perles for Sx management  - Decadron 6mg  qd  - Holding abx as procal negative  - f/u Blood Cultures, Sputum Cx  - Check D-dimer, CRP, Lactic Acid  - Lovenox for VTE ppx      Hypotension due to hypovolemia- (present on admission)  Assessment & Plan  LR 75cc/hr  DC when BP improves and monitor respiratory status closely    Acute respiratory failure with hypoxia (HCC)- (present on admission)  Assessment & Plan  Treatment as above      VTE prophylaxis: enoxaparin ppx

## 2021-10-30 NOTE — DISCHARGE PLANNING
Agency/Facility Name: [facility]  Spoke To: Justen  Outcome: Justen stated he is going to go to the office to process the order.

## 2021-10-30 NOTE — ED NOTES
Updated patient on plan of care, verbalized understanding. Patient given morning medications. Awaiting admission bed

## 2021-10-30 NOTE — ED NOTES
Blood cultures x 2 drawn. Lactic acid drawn. IV fluids started. Updated patient on plan of care, verbalized understanding. Patient awaiting admission bed

## 2021-10-30 NOTE — PROGRESS NOTES
"Hospital Medicine Daily Progress Note    Date of Service  10/30/2021    Chief Complaint  Vicenta Saeed is a 66 y.o. female admitted 10/29/2021 with flu like symptoms     Hospital Course  No notes on file   Pleasant 66-year-old female with no significant past medical history who presented to the hospital on 10/29/2021 with complaints of flulike symptoms for the past 1 week.  Patient recently traveled from [facility] she was tested prior to traveling which was negative however on arrival here today she is COVID-19 positive.  She is unvaccinated.  Upon arrival she has mild tachycardia and soft blood pressures although MAP has maintained >65. She is hypoxic requiring 2L of supplemental oxygen. She was admitted for further evaluation/treatment of acute hypoxic respiratory failure secondary to COVID-19 PNA.     Interval Problem Update  Patient seen and examined, overall feels week and tired, denies significant shortness of breath, feels \"off balance\" with walking   - vitals stable, BP soft but improving, will given 500 ml bolus for total ~1L since admission, then stop IVF and monitor BP, encourage diet and oral hydration   - currently on 2L, home O2 evaluation pending   - continue decadron   - if BP stable, pt able to ambulate and O2 needs <6L, plan for discharge home with O2 monitoring program (this was ordered)    Addendum: Patient very symptomatic with Home Oxygen evaluation with increasing SOB and dizziness. Given significant symptoms, and persistent soft BP, will monitor for additional night. If improved may consider discharging with Home O2 in the next 24 hours.     I have personally seen and examined the patient at bedside. I discussed the plan of care with patient and bedside RN.    Consultants/Specialty  NA    Code Status  Full Code    Disposition  Patient is not medically cleared.   Anticipate discharge to to home with close outpatient follow-up.  I have placed the appropriate orders for post-discharge " needs.    Review of Systems  Review of Systems   Constitutional: Positive for malaise/fatigue. Negative for fever.   HENT: Negative for sore throat.    Eyes: Negative for blurred vision and double vision.   Respiratory: Positive for cough and shortness of breath.    Cardiovascular: Negative for chest pain and leg swelling.   Gastrointestinal: Negative for abdominal pain, nausea and vomiting.        Decreased oral intake, loss of taste/smell   Genitourinary: Negative for dysuria, frequency and urgency.   Musculoskeletal: Negative for myalgias.   Neurological: Positive for dizziness. Negative for focal weakness and headaches.   Psychiatric/Behavioral: Negative for depression. The patient is not nervous/anxious.         Physical Exam  Temp:  [36.8 °C (98.3 °F)-38.2 °C (100.7 °F)] 37.7 °C (99.8 °F)  Pulse:  [67-97] 73  Resp:  [17-20] 20  BP: ()/(50-68) 110/64  SpO2:  [85 %-97 %] 96 %    Physical Exam  Vitals and nursing note reviewed.   Constitutional:       General: She is not in acute distress.     Appearance: Normal appearance. She is normal weight. She is not ill-appearing or toxic-appearing.   HENT:      Head: Normocephalic and atraumatic.      Mouth/Throat:      Mouth: Mucous membranes are moist.      Pharynx: Oropharynx is clear.   Eyes:      Extraocular Movements: Extraocular movements intact.      Conjunctiva/sclera: Conjunctivae normal.   Cardiovascular:      Rate and Rhythm: Normal rate and regular rhythm.      Pulses: Normal pulses.      Heart sounds: No murmur heard.   No gallop.    Pulmonary:      Effort: Pulmonary effort is normal.      Breath sounds: Rales present. Wheezes: mild bibasilar rales.   Abdominal:      General: Bowel sounds are normal. There is no distension.      Palpations: Abdomen is soft.      Tenderness: There is no abdominal tenderness. There is no guarding or rebound.   Musculoskeletal:         General: Normal range of motion.      Cervical back: Neck supple.      Right lower leg:  No edema.      Left lower leg: No edema.   Skin:     General: Skin is warm and dry.      Capillary Refill: Capillary refill takes less than 2 seconds.   Neurological:      General: No focal deficit present.      Mental Status: She is alert and oriented to person, place, and time. Mental status is at baseline.   Psychiatric:         Mood and Affect: Mood normal.         Behavior: Behavior normal.         Thought Content: Thought content normal.         Judgment: Judgment normal.         Fluids  No intake or output data in the 24 hours ending 10/30/21 0943    Laboratory  Recent Labs     10/29/21  2200   WBC 4.5*   RBC 4.58   HEMOGLOBIN 13.6   HEMATOCRIT 41.2   MCV 90.0   MCH 29.7   MCHC 33.0*   RDW 43.3   PLATELETCT 149*   MPV 10.2     Recent Labs     10/29/21  2200   SODIUM 135   POTASSIUM 3.8   CHLORIDE 98   CO2 24   GLUCOSE 108*   BUN 12   CREATININE 0.96   CALCIUM 8.5                   Imaging  DX-CHEST-PORTABLE (1 VIEW)   Final Result         1.  Patchy bilateral pulmonary infiltrates   2.  Atherosclerosis           Assessment/Plan  * Pneumonia due to COVID-19 virus- (present on admission)  Assessment & Plan  Flulike symptoms for the past 1 week, COVID-19 positive, unvaccinated   Requiring 2L supplemental oxygen  S/p Decadron 6 mg in ED  - O2 therapy, and proning as needed  - Tessalon/guiafenicin for Sx management  - Decadron 6mg qd x 10 days   - Holding abx as procal negative  - f/u Blood Cultures, Sputum Cx  - Check D-dimer and CRP with mod elevation (<3)  Lactic Acid is normal   - Lovenox for VTE ppx    Pending Home O2 evaluation    Acute respiratory failure with hypoxia (HCC)- (present on admission)  Assessment & Plan  Acute hypoxic respiratory failure due to COVID-19 PNA   Treatment as above    Hypotension due to hypovolemia- (present on admission)  Assessment & Plan  Soft blood pressures, MAP >65, likely due to poor oral intake   Has received ~700 ml since admission, will give additional 500 ml then stop  and monitor   Encourage oral intake and hydration        VTE prophylaxis: enoxaparin ppx    I have performed a physical exam and reviewed and updated ROS and Plan today (10/30/2021). In review of yesterday's note (10/29/2021), there are no changes except as documented above.

## 2021-10-30 NOTE — ASSESSMENT & PLAN NOTE
Patient has had persistent hypotension although her MAP is greater than 65  Continue midodrine 10 mg 3 times daily, wean as able  A.m. cortisol was within normal range  Encourage oral intake/hydration

## 2021-10-30 NOTE — DISCHARGE PLANNING
Anticipated Discharge Disposition: Home with Remote Monitoring Program and oxygen    Action: Voalte to [facility] for set up and choice to DPA. Pt has Medicaid FFS    Barriers to Discharge: Remote Monitoring Program is not currently accepting Medicaid FFS per Lane in [facility]    Plan: Pt to be admitted after discussion with Dr Madrigal. DPA notified to hold off on oxygen for now

## 2021-10-30 NOTE — RESPIRATORY CARE
REMOTE MONITORING PROGRAM by RESPIRATORY THERAPY  10/30/2021 at 10:20 AM by [address] [place]     Patient doesn't qualify for RMP due to insurance

## 2021-10-30 NOTE — ED NOTES
Pt reporting dizziness and palpitations that started 25 minutes ago, change in temperature and soft pressures noted.

## 2021-10-30 NOTE — DISCHARGE PLANNING
Received Choice form at 1014  Agency/Facility Name: [facility]  Referral sent per Choice form @ [id number]

## 2021-10-31 ENCOUNTER — APPOINTMENT (OUTPATIENT)
Dept: RADIOLOGY | Facility: MEDICAL CENTER | Age: 66
DRG: 177 | End: 2021-10-31
Attending: HOSPITALIST
Payer: COMMERCIAL

## 2021-10-31 PROBLEM — D69.6 THROMBOCYTOPENIA (HCC): Status: ACTIVE | Noted: 2021-10-31

## 2021-10-31 LAB
25(OH)D3 SERPL-MCNC: 40 NG/ML (ref 30–100)
ANION GAP SERPL CALC-SCNC: 10 MMOL/L (ref 7–16)
BASOPHILS # BLD AUTO: 0 % (ref 0–1.8)
BASOPHILS # BLD: 0 K/UL (ref 0–0.12)
BUN SERPL-MCNC: 11 MG/DL (ref 8–22)
CALCIUM SERPL-MCNC: 8.4 MG/DL (ref 8.5–10.5)
CHLORIDE SERPL-SCNC: 103 MMOL/L (ref 96–112)
CO2 SERPL-SCNC: 23 MMOL/L (ref 20–33)
CREAT SERPL-MCNC: 0.67 MG/DL (ref 0.5–1.4)
EOSINOPHIL # BLD AUTO: 0 K/UL (ref 0–0.51)
EOSINOPHIL NFR BLD: 0 % (ref 0–6.9)
ERYTHROCYTE [DISTWIDTH] IN BLOOD BY AUTOMATED COUNT: 42.1 FL (ref 35.9–50)
GLUCOSE SERPL-MCNC: 105 MG/DL (ref 65–99)
HCT VFR BLD AUTO: 38.4 % (ref 37–47)
HGB BLD-MCNC: 12.9 G/DL (ref 12–16)
LYMPHOCYTES # BLD AUTO: 0.9 K/UL (ref 1–4.8)
LYMPHOCYTES NFR BLD: 15.2 % (ref 22–41)
MAGNESIUM SERPL-MCNC: 2.1 MG/DL (ref 1.5–2.5)
MANUAL DIFF BLD: NORMAL
MCH RBC QN AUTO: 29.6 PG (ref 27–33)
MCHC RBC AUTO-ENTMCNC: 33.6 G/DL (ref 33.6–35)
MCV RBC AUTO: 88.1 FL (ref 81.4–97.8)
MONOCYTES # BLD AUTO: 0.21 K/UL (ref 0–0.85)
MONOCYTES NFR BLD AUTO: 3.6 % (ref 0–13.4)
MORPHOLOGY BLD-IMP: NORMAL
NEUTROPHILS # BLD AUTO: 4.79 K/UL (ref 2–7.15)
NEUTROPHILS NFR BLD: 79.4 % (ref 44–72)
NEUTS BAND NFR BLD MANUAL: 1.8 % (ref 0–10)
NRBC # BLD AUTO: 0 K/UL
NRBC BLD-RTO: 0 /100 WBC
PLATELET # BLD AUTO: 141 K/UL (ref 164–446)
PLATELET BLD QL SMEAR: NORMAL
PMV BLD AUTO: 10.6 FL (ref 9–12.9)
POTASSIUM SERPL-SCNC: 3.9 MMOL/L (ref 3.6–5.5)
RBC # BLD AUTO: 4.36 M/UL (ref 4.2–5.4)
RBC BLD AUTO: NORMAL
SODIUM SERPL-SCNC: 136 MMOL/L (ref 135–145)
WBC # BLD AUTO: 5.9 K/UL (ref 4.8–10.8)

## 2021-10-31 PROCEDURE — 93970 EXTREMITY STUDY: CPT

## 2021-10-31 PROCEDURE — 82306 VITAMIN D 25 HYDROXY: CPT

## 2021-10-31 PROCEDURE — 85007 BL SMEAR W/DIFF WBC COUNT: CPT

## 2021-10-31 PROCEDURE — 36415 COLL VENOUS BLD VENIPUNCTURE: CPT

## 2021-10-31 PROCEDURE — A9270 NON-COVERED ITEM OR SERVICE: HCPCS | Performed by: HOSPITALIST

## 2021-10-31 PROCEDURE — 700102 HCHG RX REV CODE 250 W/ 637 OVERRIDE(OP): Performed by: STUDENT IN AN ORGANIZED HEALTH CARE EDUCATION/TRAINING PROGRAM

## 2021-10-31 PROCEDURE — 80048 BASIC METABOLIC PNL TOTAL CA: CPT

## 2021-10-31 PROCEDURE — 700111 HCHG RX REV CODE 636 W/ 250 OVERRIDE (IP): Performed by: STUDENT IN AN ORGANIZED HEALTH CARE EDUCATION/TRAINING PROGRAM

## 2021-10-31 PROCEDURE — 85027 COMPLETE CBC AUTOMATED: CPT

## 2021-10-31 PROCEDURE — 770001 HCHG ROOM/CARE - MED/SURG/GYN PRIV*

## 2021-10-31 PROCEDURE — 700102 HCHG RX REV CODE 250 W/ 637 OVERRIDE(OP): Performed by: HOSPITALIST

## 2021-10-31 PROCEDURE — A9270 NON-COVERED ITEM OR SERVICE: HCPCS | Performed by: STUDENT IN AN ORGANIZED HEALTH CARE EDUCATION/TRAINING PROGRAM

## 2021-10-31 PROCEDURE — 83735 ASSAY OF MAGNESIUM: CPT

## 2021-10-31 PROCEDURE — 99232 SBSQ HOSP IP/OBS MODERATE 35: CPT | Performed by: HOSPITALIST

## 2021-10-31 RX ORDER — CHOLECALCIFEROL (VITAMIN D3) 125 MCG
5 CAPSULE ORAL NIGHTLY
Status: DISCONTINUED | OUTPATIENT
Start: 2021-10-31 | End: 2021-11-13 | Stop reason: HOSPADM

## 2021-10-31 RX ORDER — ZINC SULFATE 50(220)MG
220 CAPSULE ORAL DAILY
Status: DISCONTINUED | OUTPATIENT
Start: 2021-10-31 | End: 2021-11-02

## 2021-10-31 RX ORDER — VITAMIN B COMPLEX
1000 TABLET ORAL 2 TIMES DAILY
Status: DISCONTINUED | OUTPATIENT
Start: 2021-10-31 | End: 2021-11-13 | Stop reason: HOSPADM

## 2021-10-31 RX ORDER — CALCIUM CARBONATE 500 MG/1
500 TABLET, CHEWABLE ORAL ONCE
Status: COMPLETED | OUTPATIENT
Start: 2021-10-31 | End: 2021-10-31

## 2021-10-31 RX ADMIN — Medication 1000 UNITS: at 10:18

## 2021-10-31 RX ADMIN — CALCIUM CARBONATE 500 MG: 500 TABLET, CHEWABLE ORAL at 13:29

## 2021-10-31 RX ADMIN — ENOXAPARIN SODIUM 40 MG: 40 INJECTION SUBCUTANEOUS at 05:35

## 2021-10-31 RX ADMIN — BENZONATATE 100 MG: 100 CAPSULE ORAL at 13:34

## 2021-10-31 RX ADMIN — ZINC SULFATE 220 MG (50 MG) CAPSULE 220 MG: CAPSULE at 10:18

## 2021-10-31 RX ADMIN — DEXAMETHASONE 6 MG: 4 TABLET ORAL at 05:35

## 2021-10-31 RX ADMIN — Medication 5 MG: at 20:42

## 2021-10-31 RX ADMIN — Medication 1000 UNITS: at 16:13

## 2021-10-31 RX ADMIN — BENZONATATE 100 MG: 100 CAPSULE ORAL at 16:21

## 2021-10-31 ASSESSMENT — ENCOUNTER SYMPTOMS
BRUISES/BLEEDS EASILY: 0
CARDIOVASCULAR NEGATIVE: 1
SHORTNESS OF BREATH: 1
BLURRED VISION: 0
FOCAL WEAKNESS: 0
WEIGHT LOSS: 0
EYES NEGATIVE: 1
FEVER: 0
MUSCULOSKELETAL NEGATIVE: 1
WEAKNESS: 0
COUGH: 1
PALPITATIONS: 0
NERVOUS/ANXIOUS: 1
VOMITING: 0
CONSTITUTIONAL NEGATIVE: 1
DEPRESSION: 0
DIAPHORESIS: 0
NAUSEA: 1
ABDOMINAL PAIN: 0
HEADACHES: 0
NEUROLOGICAL NEGATIVE: 1
DIZZINESS: 0
MYALGIAS: 0
CHILLS: 0
SORE THROAT: 0

## 2021-10-31 ASSESSMENT — LIFESTYLE VARIABLES: SUBSTANCE_ABUSE: 0

## 2021-10-31 ASSESSMENT — PAIN DESCRIPTION - PAIN TYPE: TYPE: ACUTE PAIN

## 2021-10-31 NOTE — PROGRESS NOTES
BP 92/54 MAP 67 this morning, pt denies dizziness/issues, MD notified and aware, told to monitor for now.

## 2021-10-31 NOTE — PROGRESS NOTES
Hospital Medicine Daily Progress Note    Date of Service  10/31/2021    Chief Complaint  Vicenta Saeed is a 66 y.o. female admitted 10/29/2021 with shortness of breath    Hospital Course  Patient is a 66 year old female with no significant past medical history who presnted to the ER on 10/30/1 with dizziness, cough and fever. She has not been vaccinated and just returned from a long visit to [facility].  She developed flu like symptoms on 10/25/21 and outpatient covid testing was negative, however a test here was positive.    Her cxr was consistent with covid pneumonia.  Procalcitonin was low but d dimer elevated, she was found to have acute hypoxic respiratory failure with an oxygen saturation of 85% on RA.    Interval Problem Update  She reports mild dizziness this am that has now resolved, mild hypotension. No n/v or diarrhea but mild nausea.   Oxygen requirements stable at 2 L. LE dopplers pending  Ros otherwise negative.   axox3    Home o2 eval pending    I have personally seen and examined the patient at bedside. I discussed the plan of care with patient and nursing    Consultants/Specialty  Code Status  Full Code    Disposition  Patient is not medically cleared.   Anticipate discharge to to home with close outpatient follow-up.      Review of Systems  Review of Systems   Constitutional: Negative.  Negative for chills, diaphoresis, fever, malaise/fatigue and weight loss.   HENT: Negative.  Negative for sore throat.    Eyes: Negative.  Negative for blurred vision.   Respiratory: Positive for cough and shortness of breath.    Cardiovascular: Negative.  Negative for chest pain, palpitations and leg swelling.   Gastrointestinal: Positive for nausea. Negative for abdominal pain and vomiting.   Genitourinary: Negative.  Negative for dysuria.   Musculoskeletal: Negative.  Negative for myalgias.   Skin: Negative.  Negative for itching and rash.   Neurological: Negative.  Negative for dizziness, focal weakness,  weakness and headaches.   Endo/Heme/Allergies: Negative.  Does not bruise/bleed easily.   Psychiatric/Behavioral: Negative for depression, substance abuse and suicidal ideas. The patient is nervous/anxious.    All other systems reviewed and are negative.       Physical Exam  Temp:  [36.4 °C (97.6 °F)-37.1 °C (98.8 °F)] 36.7 °C (98 °F)  Pulse:  [68-75] 72  Resp:  [18-19] 18  BP: ()/(53-66) 92/54  SpO2:  [90 %-94 %] 92 %    Physical Exam  Vitals and nursing note reviewed. Exam conducted with a chaperone present.   Constitutional:       General: She is not in acute distress.     Appearance: Normal appearance. She is not diaphoretic.   HENT:      Head: Normocephalic.      Nose: Nose normal.      Mouth/Throat:      Mouth: Mucous membranes are moist.   Eyes:      Pupils: Pupils are equal, round, and reactive to light.   Cardiovascular:      Rate and Rhythm: Normal rate and regular rhythm.      Pulses: Normal pulses.      Heart sounds: Normal heart sounds.   Pulmonary:      Effort: Pulmonary effort is normal.      Breath sounds: Normal breath sounds.   Abdominal:      General: Abdomen is flat. Bowel sounds are normal.      Palpations: Abdomen is soft.   Musculoskeletal:         General: No swelling or deformity. Normal range of motion.   Skin:     General: Skin is warm and dry.      Capillary Refill: Capillary refill takes less than 2 seconds.   Neurological:      General: No focal deficit present.      Mental Status: She is alert and oriented to person, place, and time.      Cranial Nerves: No cranial nerve deficit.   Psychiatric:         Mood and Affect: Mood normal.         Behavior: Behavior normal.         Fluids    Intake/Output Summary (Last 24 hours) at 10/31/2021 1248  Last data filed at 10/31/2021 0500  Gross per 24 hour   Intake 600 ml   Output --   Net 600 ml       Laboratory  Recent Labs     10/29/21  2200 10/31/21  0257   WBC 4.5* 5.9   RBC 4.58 4.36   HEMOGLOBIN 13.6 12.9   HEMATOCRIT 41.2 38.4   MCV  90.0 88.1   MCH 29.7 29.6   MCHC 33.0* 33.6   RDW 43.3 42.1   PLATELETCT 149* 141*   MPV 10.2 10.6     Recent Labs     10/29/21  2200 10/31/21  0257   SODIUM 135 136   POTASSIUM 3.8 3.9   CHLORIDE 98 103   CO2 24 23   GLUCOSE 108* 105*   BUN 12 11   CREATININE 0.96 0.67   CALCIUM 8.5 8.4*                   Imaging  DX-CHEST-PORTABLE (1 VIEW)   Final Result         1.  Patchy bilateral pulmonary infiltrates   2.  Atherosclerosis      US-EXTREMITY VENOUS LOWER BILAT    (Results Pending)        Assessment/Plan  * Pneumonia due to COVID-19 virus- (present on admission)  Assessment & Plan  Currently stable on 2L nc  following  Continue Decadron 6 mg daily   O2 therapy, and proning as needed  Tessalon/guiafenicin for Sx management   procal negative  D-dimer elevated - dopplers pending  Will check vitamin D levels  Continue supportive care    Pending Home O2 evaluation    Thrombocytopenia (HCC)  Assessment & Plan  Mild  following    Hypotension due to hypovolemia- (present on admission)  Assessment & Plan  No orthostatic changes  Improving  Continue to follow  Oral hydration encouraged    Acute respiratory failure with hypoxia (HCC)- (present on admission)  Assessment & Plan  Acute hypoxic respiratory failure due to COVID-19 PNA   See above       VTE prophylaxis: enoxaparin ppx    I have performed a physical exam and reviewed and updated ROS and Plan today (10/31/2021). In review of yesterday's note (10/30/2021), there are no changes except as documented above.

## 2021-10-31 NOTE — CARE PLAN
The patient is Stable - Low risk of patient condition declining or worsening    Shift Goals  Clinical Goals: maintain O2 sat  Patient Goals: sleep  Family Goals: ANTHONY    Progress made toward(s) clinical / shift goals: Patient was educated on care plan, discharge plan and oxygen use. Patient has maintained an adequate oxygenation above 92% on 3L NC.    Problem: Knowledge Deficit - Standard  Goal: Patient and family/care givers will demonstrate understanding of plan of care, disease process/condition, diagnostic tests and medications  10/31/2021 0312 by Greer Marrero R.N.  Outcome: Progressing  10/31/2021 0312 by Greer Marrero R.N.  Outcome: Progressing     Problem: Respiratory  Goal: Patient will achieve/maintain optimum respiratory ventilation and gas exchange  Outcome: Progressing       Patient is not progressing towards the following goals:

## 2021-11-01 ENCOUNTER — APPOINTMENT (OUTPATIENT)
Dept: RADIOLOGY | Facility: MEDICAL CENTER | Age: 66
DRG: 177 | End: 2021-11-01
Attending: HOSPITALIST
Payer: COMMERCIAL

## 2021-11-01 PROBLEM — E83.51 HYPOCALCEMIA: Status: ACTIVE | Noted: 2021-11-01

## 2021-11-01 LAB
BASOPHILS # BLD AUTO: 0 % (ref 0–1.8)
BASOPHILS # BLD: 0 K/UL (ref 0–0.12)
CALCIUM SERPL-MCNC: 8.3 MG/DL (ref 8.5–10.5)
EOSINOPHIL # BLD AUTO: 0 K/UL (ref 0–0.51)
EOSINOPHIL NFR BLD: 0 % (ref 0–6.9)
ERYTHROCYTE [DISTWIDTH] IN BLOOD BY AUTOMATED COUNT: 42.2 FL (ref 35.9–50)
HCT VFR BLD AUTO: 36.3 % (ref 37–47)
HGB BLD-MCNC: 12 G/DL (ref 12–16)
LYMPHOCYTES # BLD AUTO: 0.25 K/UL (ref 1–4.8)
LYMPHOCYTES NFR BLD: 3.6 % (ref 22–41)
MANUAL DIFF BLD: NORMAL
MCH RBC QN AUTO: 29.5 PG (ref 27–33)
MCHC RBC AUTO-ENTMCNC: 33.1 G/DL (ref 33.6–35)
MCV RBC AUTO: 89.2 FL (ref 81.4–97.8)
MONOCYTES # BLD AUTO: 0.19 K/UL (ref 0–0.85)
MONOCYTES NFR BLD AUTO: 2.7 % (ref 0–13.4)
MORPHOLOGY BLD-IMP: NORMAL
NEUTROPHILS # BLD AUTO: 6.47 K/UL (ref 2–7.15)
NEUTROPHILS NFR BLD: 93.7 % (ref 44–72)
NRBC # BLD AUTO: 0 K/UL
NRBC BLD-RTO: 0 /100 WBC
PLATELET # BLD AUTO: 158 K/UL (ref 164–446)
PLATELET BLD QL SMEAR: NORMAL
PMV BLD AUTO: 10.6 FL (ref 9–12.9)
RBC # BLD AUTO: 4.07 M/UL (ref 4.2–5.4)
RBC BLD AUTO: NORMAL
WBC # BLD AUTO: 6.9 K/UL (ref 4.8–10.8)

## 2021-11-01 PROCEDURE — A9270 NON-COVERED ITEM OR SERVICE: HCPCS | Performed by: STUDENT IN AN ORGANIZED HEALTH CARE EDUCATION/TRAINING PROGRAM

## 2021-11-01 PROCEDURE — 36415 COLL VENOUS BLD VENIPUNCTURE: CPT

## 2021-11-01 PROCEDURE — 82310 ASSAY OF CALCIUM: CPT

## 2021-11-01 PROCEDURE — 700105 HCHG RX REV CODE 258: Performed by: HOSPITALIST

## 2021-11-01 PROCEDURE — A9270 NON-COVERED ITEM OR SERVICE: HCPCS | Performed by: NURSE PRACTITIONER

## 2021-11-01 PROCEDURE — 700111 HCHG RX REV CODE 636 W/ 250 OVERRIDE (IP): Performed by: STUDENT IN AN ORGANIZED HEALTH CARE EDUCATION/TRAINING PROGRAM

## 2021-11-01 PROCEDURE — 700102 HCHG RX REV CODE 250 W/ 637 OVERRIDE(OP): Performed by: HOSPITALIST

## 2021-11-01 PROCEDURE — 99233 SBSQ HOSP IP/OBS HIGH 50: CPT | Performed by: HOSPITALIST

## 2021-11-01 PROCEDURE — 71045 X-RAY EXAM CHEST 1 VIEW: CPT

## 2021-11-01 PROCEDURE — A9270 NON-COVERED ITEM OR SERVICE: HCPCS | Performed by: HOSPITALIST

## 2021-11-01 PROCEDURE — 770001 HCHG ROOM/CARE - MED/SURG/GYN PRIV*

## 2021-11-01 PROCEDURE — 700102 HCHG RX REV CODE 250 W/ 637 OVERRIDE(OP): Performed by: STUDENT IN AN ORGANIZED HEALTH CARE EDUCATION/TRAINING PROGRAM

## 2021-11-01 PROCEDURE — 700102 HCHG RX REV CODE 250 W/ 637 OVERRIDE(OP): Performed by: NURSE PRACTITIONER

## 2021-11-01 PROCEDURE — 85027 COMPLETE CBC AUTOMATED: CPT

## 2021-11-01 PROCEDURE — 85007 BL SMEAR W/DIFF WBC COUNT: CPT

## 2021-11-01 RX ORDER — SODIUM CHLORIDE, SODIUM LACTATE, POTASSIUM CHLORIDE, AND CALCIUM CHLORIDE .6; .31; .03; .02 G/100ML; G/100ML; G/100ML; G/100ML
250 INJECTION, SOLUTION INTRAVENOUS ONCE
Status: COMPLETED | OUTPATIENT
Start: 2021-11-01 | End: 2021-11-01

## 2021-11-01 RX ORDER — GUAIFENESIN/DEXTROMETHORPHAN 100-10MG/5
5 SYRUP ORAL EVERY 6 HOURS PRN
Status: DISCONTINUED | OUTPATIENT
Start: 2021-11-01 | End: 2021-11-13 | Stop reason: HOSPADM

## 2021-11-01 RX ORDER — CALCIUM CARBONATE 500 MG/1
500 TABLET, CHEWABLE ORAL ONCE
Status: COMPLETED | OUTPATIENT
Start: 2021-11-01 | End: 2021-11-01

## 2021-11-01 RX ADMIN — BENZONATATE 100 MG: 100 CAPSULE ORAL at 20:41

## 2021-11-01 RX ADMIN — Medication 1000 UNITS: at 06:06

## 2021-11-01 RX ADMIN — ZINC SULFATE 220 MG (50 MG) CAPSULE 220 MG: CAPSULE at 06:06

## 2021-11-01 RX ADMIN — GUAIFENESIN AND DEXTROMETHORPHAN 5 ML: 100; 10 SYRUP ORAL at 23:27

## 2021-11-01 RX ADMIN — Medication 1000 UNITS: at 18:00

## 2021-11-01 RX ADMIN — ENOXAPARIN SODIUM 40 MG: 40 INJECTION SUBCUTANEOUS at 06:06

## 2021-11-01 RX ADMIN — SODIUM CHLORIDE, POTASSIUM CHLORIDE, SODIUM LACTATE AND CALCIUM CHLORIDE 250 ML: 600; 310; 30; 20 INJECTION, SOLUTION INTRAVENOUS at 08:04

## 2021-11-01 RX ADMIN — Medication 5 MG: at 20:41

## 2021-11-01 RX ADMIN — CALCIUM CARBONATE 500 MG: 500 TABLET, CHEWABLE ORAL at 17:59

## 2021-11-01 RX ADMIN — SODIUM CHLORIDE, POTASSIUM CHLORIDE, SODIUM LACTATE AND CALCIUM CHLORIDE 250 ML: 600; 310; 30; 20 INJECTION, SOLUTION INTRAVENOUS at 09:52

## 2021-11-01 RX ADMIN — DEXAMETHASONE 6 MG: 4 TABLET ORAL at 06:06

## 2021-11-01 ASSESSMENT — ENCOUNTER SYMPTOMS
VOMITING: 0
EYES NEGATIVE: 1
COUGH: 1
CHILLS: 0
SORE THROAT: 0
ABDOMINAL PAIN: 0
NAUSEA: 1
WEIGHT LOSS: 0
BLURRED VISION: 0
WEAKNESS: 0
PALPITATIONS: 0
CONSTITUTIONAL NEGATIVE: 1
MUSCULOSKELETAL NEGATIVE: 1
DIZZINESS: 0
SHORTNESS OF BREATH: 1
BRUISES/BLEEDS EASILY: 0
FOCAL WEAKNESS: 0
MYALGIAS: 0
DIAPHORESIS: 0
HEADACHES: 0
NERVOUS/ANXIOUS: 0
FEVER: 0
DEPRESSION: 0
CARDIOVASCULAR NEGATIVE: 1
NEUROLOGICAL NEGATIVE: 1

## 2021-11-01 ASSESSMENT — LIFESTYLE VARIABLES: SUBSTANCE_ABUSE: 0

## 2021-11-01 ASSESSMENT — PAIN DESCRIPTION - PAIN TYPE: TYPE: ACUTE PAIN

## 2021-11-01 NOTE — DISCHARGE PLANNING
Agency/Facility Name: [facility]  Spoke To: Christy  Outcome: Per christy they are not seeing any O2 order in their system, DPA will send new O2 order    1011  Agency/Facility Name: [facility]  Spoke To: Gabino  Outcome: Per gabino they are not contacted with [employer]    DPA sent to preferred DME       Agency/Facility Name: [facility]   Spoke To: pramod  Outcome:Per pramod they did not receive referral, DPA resent    1256  Agency/Facility Name: [facility]  Spoke To: Kallie  Outcome: Per kallie they received the referral

## 2021-11-01 NOTE — PROGRESS NOTES
0757: Notified MD that patient's BP was in the low 80's, pt states dizziness while sitting in bed, pt satting 82 on 3L NC, titrated up to 10L oxymask and satting at 90-92, new orders put in by MD, medication given per MAR.     0844: BP recheck 101/59 HR 70, pt denies dizziness, and O2 needs are 5L NC at 90 percent. Pt denies any other needs at this time, educated on fall precautions.       COVID 19 surge in effect

## 2021-11-01 NOTE — PROGRESS NOTES
Hospital Medicine Daily Progress Note    Date of Service  11/1/2021    Chief Complaint  Vicenta Saeed is a 66 y.o. female admitted 10/29/2021 with shortness of breath    Hospital Course  Patient is a 66 year old female with no significant past medical history who presnted to the ER on 10/30/1 with dizziness, cough and fever. She has not been vaccinated and just returned from a long visit to [facility].  She developed flu like symptoms on 10/25/21 and outpatient covid testing was negative, however a test here was positive.    Her cxr was consistent with covid pneumonia.  Procalcitonin was low but d dimer elevated, she was found to have acute hypoxic respiratory failure with an oxygen saturation of 85% on RA.    Interval Problem Update  Axox3, worsening hypotension this am had sbp of 80, clinically dry on exam with associated dizziness - resolved with fluids.  Her oxygen requirements did increase, at one point up to 9L, cxr obtained and showed improvement, now she is tolerating 4L. Will continue to monitor volume status closely, dizziness has resolved.  She denies sob currently, cough improving. No n/v or diarrhea. Following closely    I have personally seen and examined the patient at bedside. I discussed the plan of care with patient, nursing and case management    Consultants/Specialty  Code Status  Full Code    Disposition  Patient is not medically cleared.   Anticipate discharge to to home with close outpatient follow-up.      Review of Systems  Review of Systems   Constitutional: Negative.  Negative for chills, diaphoresis, fever, malaise/fatigue and weight loss.   HENT: Negative.  Negative for sore throat.    Eyes: Negative.  Negative for blurred vision.   Respiratory: Positive for cough and shortness of breath.    Cardiovascular: Negative.  Negative for chest pain, palpitations and leg swelling.   Gastrointestinal: Positive for nausea. Negative for abdominal pain and vomiting.   Genitourinary: Negative.   Negative for dysuria.   Musculoskeletal: Negative.  Negative for myalgias.   Skin: Negative.  Negative for itching and rash.   Neurological: Negative.  Negative for dizziness, focal weakness, weakness and headaches.   Endo/Heme/Allergies: Negative.  Does not bruise/bleed easily.   Psychiatric/Behavioral: Negative for depression, substance abuse and suicidal ideas. The patient is not nervous/anxious.    All other systems reviewed and are negative.       Physical Exam  Temp:  [36.2 °C (97.1 °F)-37.7 °C (99.8 °F)] 36.2 °C (97.1 °F)  Pulse:  [68-76] 68  Resp:  [16-20] 18  BP: ()/(46-64) 91/54  SpO2:  [89 %-91 %] 89 %    Physical Exam  Vitals and nursing note reviewed. Exam conducted with a chaperone present.   Constitutional:       General: She is not in acute distress.     Appearance: Normal appearance. She is not diaphoretic.   HENT:      Head: Normocephalic.      Nose: Nose normal.      Mouth/Throat:      Mouth: Mucous membranes are moist.   Eyes:      Pupils: Pupils are equal, round, and reactive to light.   Cardiovascular:      Rate and Rhythm: Normal rate and regular rhythm.      Pulses: Normal pulses.      Heart sounds: Normal heart sounds.   Pulmonary:      Effort: Pulmonary effort is normal.      Breath sounds: Normal breath sounds.   Abdominal:      General: Abdomen is flat. Bowel sounds are normal.      Palpations: Abdomen is soft.   Musculoskeletal:         General: No swelling or deformity. Normal range of motion.   Skin:     General: Skin is warm and dry.      Capillary Refill: Capillary refill takes less than 2 seconds.   Neurological:      General: No focal deficit present.      Mental Status: She is alert and oriented to person, place, and time.      Cranial Nerves: No cranial nerve deficit.   Psychiatric:         Mood and Affect: Mood normal.         Behavior: Behavior normal.         Fluids    Intake/Output Summary (Last 24 hours) at 11/1/2021 1416  Last data filed at 11/1/2021 0500  Gross per  24 hour   Intake 240 ml   Output --   Net 240 ml       Laboratory  Recent Labs     10/29/21  2200 10/31/21  0257 11/01/21  0843   WBC 4.5* 5.9 6.9   RBC 4.58 4.36 4.07*   HEMOGLOBIN 13.6 12.9 12.0   HEMATOCRIT 41.2 38.4 36.3*   MCV 90.0 88.1 89.2   MCH 29.7 29.6 29.5   MCHC 33.0* 33.6 33.1*   RDW 43.3 42.1 42.2   PLATELETCT 149* 141* 158*   MPV 10.2 10.6 10.6     Recent Labs     10/29/21  2200 10/31/21  0257 11/01/21  0843   SODIUM 135 136  --    POTASSIUM 3.8 3.9  --    CHLORIDE 98 103  --    CO2 24 23  --    GLUCOSE 108* 105*  --    BUN 12 11  --    CREATININE 0.96 0.67  --    CALCIUM 8.5 8.4* 8.3*                   Imaging  DX-CHEST-LIMITED (1 VIEW)   Final Result         1.  There is some decrease in opacification laterally in the left lung compared to the prior radiograph.      2.  Some persistent opacifications are noted which are most prominent in the right upper lobe consistent with pneumonia.      3.  No new infiltrates or consolidations are identified.      US-EXTREMITY VENOUS LOWER BILAT   Final Result      DX-CHEST-PORTABLE (1 VIEW)   Final Result         1.  Patchy bilateral pulmonary infiltrates   2.  Atherosclerosis           Assessment/Plan  * Pneumonia due to COVID-19 virus- (present on admission)  Assessment & Plan  See above, hypoxia worsening but now improving significantly, exam stable, cxr improving  following  Continue Decadron 6 mg daily   O2 therapy, and proning as needed  Tessalon/guiafenicin for Sx management   procal negative  D-dimer elevated - dopplers negative for dvt  vitamin D level 40  Continue supportive care    Pending Home O2 evaluation    Hypocalcemia  Assessment & Plan  Will replace with tums  Check albumin  following    Thrombocytopenia (HCC)  Assessment & Plan  Mild  Improved overnight  following    Hypotension due to hypovolemia- (present on admission)  Assessment & Plan  No orthostatic changes  Required conservative iv fluids  Now much improved  Continue to follow  Oral  hydration encouraged    Acute respiratory failure with hypoxia (HCC)- (present on admission)  Assessment & Plan  Acute hypoxic respiratory failure due to COVID-19 PNA   See above       VTE prophylaxis: enoxaparin ppx    I have performed a physical exam and reviewed and updated ROS and Plan today (11/1/2021). In review of yesterday's note (10/31/2021), there are no changes except as documented above.

## 2021-11-02 PROBLEM — J96.01 ACUTE HYPOXEMIC RESPIRATORY FAILURE DUE TO COVID-19 (HCC): Status: ACTIVE | Noted: 2021-10-30

## 2021-11-02 LAB
ALBUMIN SERPL BCP-MCNC: 3.2 G/DL (ref 3.2–4.9)
ALBUMIN SERPL BCP-MCNC: 3.4 G/DL (ref 3.2–4.9)
ALBUMIN/GLOB SERPL: 1 G/DL
ALP SERPL-CCNC: 72 U/L (ref 30–99)
ALT SERPL-CCNC: 54 U/L (ref 2–50)
ANION GAP SERPL CALC-SCNC: 11 MMOL/L (ref 7–16)
ANION GAP SERPL CALC-SCNC: 12 MMOL/L (ref 7–16)
ANISOCYTOSIS BLD QL SMEAR: ABNORMAL
AST SERPL-CCNC: 57 U/L (ref 12–45)
BASOPHILS # BLD AUTO: 0 % (ref 0–1.8)
BASOPHILS # BLD: 0 K/UL (ref 0–0.12)
BILIRUB SERPL-MCNC: 0.4 MG/DL (ref 0.1–1.5)
BUN SERPL-MCNC: 10 MG/DL (ref 8–22)
BUN SERPL-MCNC: 10 MG/DL (ref 8–22)
BURR CELLS BLD QL SMEAR: NORMAL
CALCIUM SERPL-MCNC: 8.3 MG/DL (ref 8.5–10.5)
CALCIUM SERPL-MCNC: 8.7 MG/DL (ref 8.5–10.5)
CHLORIDE SERPL-SCNC: 102 MMOL/L (ref 96–112)
CHLORIDE SERPL-SCNC: 103 MMOL/L (ref 96–112)
CO2 SERPL-SCNC: 22 MMOL/L (ref 20–33)
CO2 SERPL-SCNC: 24 MMOL/L (ref 20–33)
COMMENT 1642: NORMAL
CREAT SERPL-MCNC: 0.61 MG/DL (ref 0.5–1.4)
CREAT SERPL-MCNC: 0.72 MG/DL (ref 0.5–1.4)
EOSINOPHIL # BLD AUTO: 0 K/UL (ref 0–0.51)
EOSINOPHIL NFR BLD: 0 % (ref 0–6.9)
ERYTHROCYTE [DISTWIDTH] IN BLOOD BY AUTOMATED COUNT: 42 FL (ref 35.9–50)
GLOBULIN SER CALC-MCNC: 3.3 G/DL (ref 1.9–3.5)
GLUCOSE SERPL-MCNC: 103 MG/DL (ref 65–99)
GLUCOSE SERPL-MCNC: 135 MG/DL (ref 65–99)
HCT VFR BLD AUTO: 38.3 % (ref 37–47)
HGB BLD-MCNC: 12.8 G/DL (ref 12–16)
IMM GRANULOCYTES # BLD AUTO: 0.03 K/UL (ref 0–0.11)
IMM GRANULOCYTES NFR BLD AUTO: 0.4 % (ref 0–0.9)
LYMPHOCYTES # BLD AUTO: 1.76 K/UL (ref 1–4.8)
LYMPHOCYTES NFR BLD: 22.9 % (ref 22–41)
MCH RBC QN AUTO: 29.4 PG (ref 27–33)
MCHC RBC AUTO-ENTMCNC: 33.4 G/DL (ref 33.6–35)
MCV RBC AUTO: 88 FL (ref 81.4–97.8)
MICROCYTES BLD QL SMEAR: ABNORMAL
MONOCYTES # BLD AUTO: 0.39 K/UL (ref 0–0.85)
MONOCYTES NFR BLD AUTO: 5.1 % (ref 0–13.4)
MORPHOLOGY BLD-IMP: NORMAL
NEUTROPHILS # BLD AUTO: 5.49 K/UL (ref 2–7.15)
NEUTROPHILS NFR BLD: 71.6 % (ref 44–72)
NRBC # BLD AUTO: 0 K/UL
NRBC BLD-RTO: 0 /100 WBC
OVALOCYTES BLD QL SMEAR: NORMAL
PLATELET # BLD AUTO: 189 K/UL (ref 164–446)
PLATELET BLD QL SMEAR: NORMAL
PMV BLD AUTO: 10.6 FL (ref 9–12.9)
POIKILOCYTOSIS BLD QL SMEAR: NORMAL
POTASSIUM SERPL-SCNC: 3.8 MMOL/L (ref 3.6–5.5)
POTASSIUM SERPL-SCNC: 4.5 MMOL/L (ref 3.6–5.5)
PROT SERPL-MCNC: 6.5 G/DL (ref 6–8.2)
RBC # BLD AUTO: 4.35 M/UL (ref 4.2–5.4)
RBC BLD AUTO: PRESENT
SODIUM SERPL-SCNC: 136 MMOL/L (ref 135–145)
SODIUM SERPL-SCNC: 138 MMOL/L (ref 135–145)
WBC # BLD AUTO: 7.7 K/UL (ref 4.8–10.8)

## 2021-11-02 PROCEDURE — 700111 HCHG RX REV CODE 636 W/ 250 OVERRIDE (IP): Performed by: STUDENT IN AN ORGANIZED HEALTH CARE EDUCATION/TRAINING PROGRAM

## 2021-11-02 PROCEDURE — 0241U HCHG SARS-COV-2 COVID-19 NFCT DS RESP RNA 4 TRGT MIC: CPT

## 2021-11-02 PROCEDURE — A9270 NON-COVERED ITEM OR SERVICE: HCPCS | Performed by: NURSE PRACTITIONER

## 2021-11-02 PROCEDURE — 700102 HCHG RX REV CODE 250 W/ 637 OVERRIDE(OP): Performed by: STUDENT IN AN ORGANIZED HEALTH CARE EDUCATION/TRAINING PROGRAM

## 2021-11-02 PROCEDURE — 80053 COMPREHEN METABOLIC PANEL: CPT

## 2021-11-02 PROCEDURE — 36415 COLL VENOUS BLD VENIPUNCTURE: CPT

## 2021-11-02 PROCEDURE — 700102 HCHG RX REV CODE 250 W/ 637 OVERRIDE(OP): Performed by: NURSE PRACTITIONER

## 2021-11-02 PROCEDURE — 82040 ASSAY OF SERUM ALBUMIN: CPT

## 2021-11-02 PROCEDURE — A9270 NON-COVERED ITEM OR SERVICE: HCPCS | Performed by: STUDENT IN AN ORGANIZED HEALTH CARE EDUCATION/TRAINING PROGRAM

## 2021-11-02 PROCEDURE — C9803 HOPD COVID-19 SPEC COLLECT: HCPCS | Performed by: STUDENT IN AN ORGANIZED HEALTH CARE EDUCATION/TRAINING PROGRAM

## 2021-11-02 PROCEDURE — 94760 N-INVAS EAR/PLS OXIMETRY 1: CPT

## 2021-11-02 PROCEDURE — 94640 AIRWAY INHALATION TREATMENT: CPT

## 2021-11-02 PROCEDURE — 700111 HCHG RX REV CODE 636 W/ 250 OVERRIDE (IP): Performed by: INTERNAL MEDICINE

## 2021-11-02 PROCEDURE — XW033E5 INTRODUCTION OF REMDESIVIR ANTI-INFECTIVE INTO PERIPHERAL VEIN, PERCUTANEOUS APPROACH, NEW TECHNOLOGY GROUP 5: ICD-10-PCS | Performed by: INTERNAL MEDICINE

## 2021-11-02 PROCEDURE — 700105 HCHG RX REV CODE 258: Performed by: INTERNAL MEDICINE

## 2021-11-02 PROCEDURE — 700102 HCHG RX REV CODE 250 W/ 637 OVERRIDE(OP): Performed by: HOSPITALIST

## 2021-11-02 PROCEDURE — 85025 COMPLETE CBC W/AUTO DIFF WBC: CPT

## 2021-11-02 PROCEDURE — 770001 HCHG ROOM/CARE - MED/SURG/GYN PRIV*

## 2021-11-02 PROCEDURE — 99233 SBSQ HOSP IP/OBS HIGH 50: CPT | Performed by: STUDENT IN AN ORGANIZED HEALTH CARE EDUCATION/TRAINING PROGRAM

## 2021-11-02 PROCEDURE — 80048 BASIC METABOLIC PNL TOTAL CA: CPT

## 2021-11-02 PROCEDURE — A9270 NON-COVERED ITEM OR SERVICE: HCPCS | Performed by: HOSPITALIST

## 2021-11-02 RX ORDER — MIDODRINE HYDROCHLORIDE 5 MG/1
5 TABLET ORAL
Status: DISCONTINUED | OUTPATIENT
Start: 2021-11-02 | End: 2021-11-05

## 2021-11-02 RX ORDER — ONDANSETRON 2 MG/ML
4 INJECTION INTRAMUSCULAR; INTRAVENOUS EVERY 4 HOURS PRN
Status: DISCONTINUED | OUTPATIENT
Start: 2021-11-02 | End: 2021-11-13 | Stop reason: HOSPADM

## 2021-11-02 RX ORDER — ONDANSETRON 4 MG/1
4 TABLET, ORALLY DISINTEGRATING ORAL EVERY 4 HOURS PRN
Status: DISCONTINUED | OUTPATIENT
Start: 2021-11-02 | End: 2021-11-13 | Stop reason: HOSPADM

## 2021-11-02 RX ADMIN — ONDANSETRON 4 MG: 2 INJECTION INTRAMUSCULAR; INTRAVENOUS at 09:29

## 2021-11-02 RX ADMIN — MIDODRINE HYDROCHLORIDE 5 MG: 5 TABLET ORAL at 09:29

## 2021-11-02 RX ADMIN — REMDESIVIR 200 MG: 100 INJECTION, POWDER, LYOPHILIZED, FOR SOLUTION INTRAVENOUS at 16:01

## 2021-11-02 RX ADMIN — BENZONATATE 100 MG: 100 CAPSULE ORAL at 09:09

## 2021-11-02 RX ADMIN — Medication 1000 UNITS: at 05:02

## 2021-11-02 RX ADMIN — MIDODRINE HYDROCHLORIDE 5 MG: 5 TABLET ORAL at 17:11

## 2021-11-02 RX ADMIN — Medication 5 MG: at 20:19

## 2021-11-02 RX ADMIN — DEXAMETHASONE 6 MG: 4 TABLET ORAL at 05:03

## 2021-11-02 RX ADMIN — ZINC SULFATE 220 MG (50 MG) CAPSULE 220 MG: CAPSULE at 05:03

## 2021-11-02 RX ADMIN — GUAIFENESIN AND DEXTROMETHORPHAN 5 ML: 100; 10 SYRUP ORAL at 09:09

## 2021-11-02 RX ADMIN — Medication 1000 UNITS: at 17:11

## 2021-11-02 RX ADMIN — ENOXAPARIN SODIUM 40 MG: 40 INJECTION SUBCUTANEOUS at 05:03

## 2021-11-02 ASSESSMENT — ENCOUNTER SYMPTOMS
CONSTITUTIONAL NEGATIVE: 1
WEIGHT LOSS: 0
SORE THROAT: 0
BRUISES/BLEEDS EASILY: 0
SHORTNESS OF BREATH: 1
FALLS: 0
DEPRESSION: 0
MUSCULOSKELETAL NEGATIVE: 1
NERVOUS/ANXIOUS: 0
WEAKNESS: 0
NAUSEA: 1
COUGH: 1
BLURRED VISION: 0
MYALGIAS: 0
VOMITING: 0
NEUROLOGICAL NEGATIVE: 1
FOCAL WEAKNESS: 0
FEVER: 0
DIAPHORESIS: 0
CHILLS: 0
EYES NEGATIVE: 1
ABDOMINAL PAIN: 0
PALPITATIONS: 0
CARDIOVASCULAR NEGATIVE: 1
DIZZINESS: 0
HEADACHES: 0

## 2021-11-02 ASSESSMENT — LIFESTYLE VARIABLES: SUBSTANCE_ABUSE: 0

## 2021-11-02 ASSESSMENT — PAIN DESCRIPTION - PAIN TYPE: TYPE: ACUTE PAIN

## 2021-11-02 NOTE — CONSULTS
ID COVID 19 Assessment and Medication review    COVID-19 pneumonia, unvaccinated  Hypoxemia secondary to above, 15 L nasal cannula  HGB >8   ANC: >1000   ALC: >200   Plat: >50   GFR: >30   ALT: <250  PCT: 0.05      Plan:      -Continue supportive care with oxygen supplementation, proning, judicious use of IV fluids  -Monitor inflammatory markers   -Monitor d-dimer, ppx anticoagulation recommended unless high risk for bleeding - low threshold to initiate work-up for VTE's-    -Agree with dexamethasone 6 mg daily for planned 10-day course    -The patient meets [facility] criteria for:  Remdesivir - placed order with pharmacy for planned 5 day course.      -If the patient is cleared for discharge then stop remdesivir.  Do not keep in the hospital to complete course.     If the patient decompensates further and requires high flow then may be a candidate for baricitinib, please reconsult ID and ensure there are labs, CBC with differential and CMP    -Please obtain daily CMP to evaluate kidney and liver function while on Remdesivir.  If significant declining renal function particularly if GFR <30 or ALT >10x normal then stop Remdesivir     -Monitor for any secondary bacterial infections including pneumonia.         Che Mcbride MD  Infectious Diseases

## 2021-11-02 NOTE — CARE PLAN
The patient is Watcher - Medium risk of patient condition declining or worsening    Shift Goals  Clinical Goals: maintain O2 sat  Patient Goals: sleep  Family Goals: ANTHONY    Progress made toward(s) clinical / shift goals:  pt wearing an oxymask    Patient is not progressing towards the following goals:      Problem: Respiratory  Goal: Patient will achieve/maintain optimum respiratory ventilation and gas exchange  Outcome: Progressing  Flowsheets  Taken 11/1/2021 2046 by Josué Morales RBLANCA  O2 Delivery Device: Oxymask  Taken 10/30/2021 2000 by Greer Marrero RBLANCA  Deep Breathe and Cough: Performs Correctly  Note: Pt complains of SOB with ambulation, pt wearing an oxymask

## 2021-11-03 ENCOUNTER — APPOINTMENT (OUTPATIENT)
Dept: RADIOLOGY | Facility: MEDICAL CENTER | Age: 66
DRG: 177 | End: 2021-11-03
Attending: STUDENT IN AN ORGANIZED HEALTH CARE EDUCATION/TRAINING PROGRAM
Payer: COMMERCIAL

## 2021-11-03 PROBLEM — R74.01 TRANSAMINITIS: Status: ACTIVE | Noted: 2021-11-03

## 2021-11-03 PROBLEM — Z71.89 ACP (ADVANCE CARE PLANNING): Status: ACTIVE | Noted: 2021-11-03

## 2021-11-03 LAB
ALBUMIN SERPL BCP-MCNC: 3.3 G/DL (ref 3.2–4.9)
ALBUMIN/GLOB SERPL: 1 G/DL
ALP SERPL-CCNC: 70 U/L (ref 30–99)
ALT SERPL-CCNC: 56 U/L (ref 2–50)
ANION GAP SERPL CALC-SCNC: 13 MMOL/L (ref 7–16)
AST SERPL-CCNC: 54 U/L (ref 12–45)
BASOPHILS # BLD AUTO: 0 % (ref 0–1.8)
BASOPHILS # BLD: 0 K/UL (ref 0–0.12)
BILIRUB SERPL-MCNC: 0.4 MG/DL (ref 0.1–1.5)
BUN SERPL-MCNC: 16 MG/DL (ref 8–22)
CALCIUM SERPL-MCNC: 8.5 MG/DL (ref 8.5–10.5)
CHLORIDE SERPL-SCNC: 103 MMOL/L (ref 96–112)
CO2 SERPL-SCNC: 21 MMOL/L (ref 20–33)
CREAT SERPL-MCNC: 0.6 MG/DL (ref 0.5–1.4)
EOSINOPHIL # BLD AUTO: 0 K/UL (ref 0–0.51)
EOSINOPHIL NFR BLD: 0 % (ref 0–6.9)
ERYTHROCYTE [DISTWIDTH] IN BLOOD BY AUTOMATED COUNT: 42.6 FL (ref 35.9–50)
FLUAV RNA SPEC QL NAA+PROBE: NEGATIVE
FLUBV RNA SPEC QL NAA+PROBE: NEGATIVE
GLOBULIN SER CALC-MCNC: 3.4 G/DL (ref 1.9–3.5)
GLUCOSE SERPL-MCNC: 93 MG/DL (ref 65–99)
HAV IGM SERPL QL IA: NORMAL
HBV CORE IGM SER QL: NORMAL
HBV SURFACE AG SER QL: NORMAL
HCT VFR BLD AUTO: 38.8 % (ref 37–47)
HCV AB SER QL: NORMAL
HGB BLD-MCNC: 13 G/DL (ref 12–16)
LYMPHOCYTES # BLD AUTO: 1.77 K/UL (ref 1–4.8)
LYMPHOCYTES NFR BLD: 19 % (ref 22–41)
MAGNESIUM SERPL-MCNC: 2.1 MG/DL (ref 1.5–2.5)
MANUAL DIFF BLD: NORMAL
MCH RBC QN AUTO: 30 PG (ref 27–33)
MCHC RBC AUTO-ENTMCNC: 33.5 G/DL (ref 33.6–35)
MCV RBC AUTO: 89.6 FL (ref 81.4–97.8)
MONOCYTES # BLD AUTO: 0.19 K/UL (ref 0–0.85)
MONOCYTES NFR BLD AUTO: 2 % (ref 0–13.4)
MORPHOLOGY BLD-IMP: NORMAL
NEUTROPHILS # BLD AUTO: 7.35 K/UL (ref 2–7.15)
NEUTROPHILS NFR BLD: 79 % (ref 44–72)
NRBC # BLD AUTO: 0 K/UL
NRBC BLD-RTO: 0 /100 WBC
PHOSPHATE SERPL-MCNC: 3.4 MG/DL (ref 2.5–4.5)
PLATELET # BLD AUTO: 220 K/UL (ref 164–446)
PLATELET BLD QL SMEAR: NORMAL
PMV BLD AUTO: 10.6 FL (ref 9–12.9)
POTASSIUM SERPL-SCNC: 3.8 MMOL/L (ref 3.6–5.5)
PROT SERPL-MCNC: 6.7 G/DL (ref 6–8.2)
RBC # BLD AUTO: 4.33 M/UL (ref 4.2–5.4)
RBC BLD AUTO: NORMAL
RSV RNA SPEC QL NAA+PROBE: NEGATIVE
SARS-COV-2 RNA RESP QL NAA+PROBE: DETECTED
SODIUM SERPL-SCNC: 137 MMOL/L (ref 135–145)
SPECIMEN SOURCE: ABNORMAL
WBC # BLD AUTO: 9.3 K/UL (ref 4.8–10.8)

## 2021-11-03 PROCEDURE — 94640 AIRWAY INHALATION TREATMENT: CPT

## 2021-11-03 PROCEDURE — 700102 HCHG RX REV CODE 250 W/ 637 OVERRIDE(OP): Performed by: STUDENT IN AN ORGANIZED HEALTH CARE EDUCATION/TRAINING PROGRAM

## 2021-11-03 PROCEDURE — 770001 HCHG ROOM/CARE - MED/SURG/GYN PRIV*

## 2021-11-03 PROCEDURE — 94760 N-INVAS EAR/PLS OXIMETRY 1: CPT

## 2021-11-03 PROCEDURE — 85007 BL SMEAR W/DIFF WBC COUNT: CPT

## 2021-11-03 PROCEDURE — 83735 ASSAY OF MAGNESIUM: CPT

## 2021-11-03 PROCEDURE — 80053 COMPREHEN METABOLIC PANEL: CPT

## 2021-11-03 PROCEDURE — A9270 NON-COVERED ITEM OR SERVICE: HCPCS | Performed by: STUDENT IN AN ORGANIZED HEALTH CARE EDUCATION/TRAINING PROGRAM

## 2021-11-03 PROCEDURE — 86480 TB TEST CELL IMMUN MEASURE: CPT

## 2021-11-03 PROCEDURE — 36415 COLL VENOUS BLD VENIPUNCTURE: CPT

## 2021-11-03 PROCEDURE — 99291 CRITICAL CARE FIRST HOUR: CPT | Performed by: STUDENT IN AN ORGANIZED HEALTH CARE EDUCATION/TRAINING PROGRAM

## 2021-11-03 PROCEDURE — 700102 HCHG RX REV CODE 250 W/ 637 OVERRIDE(OP): Performed by: HOSPITALIST

## 2021-11-03 PROCEDURE — 700111 HCHG RX REV CODE 636 W/ 250 OVERRIDE (IP): Performed by: STUDENT IN AN ORGANIZED HEALTH CARE EDUCATION/TRAINING PROGRAM

## 2021-11-03 PROCEDURE — XW0DXM6 INTRODUCTION OF BARICITINIB INTO MOUTH AND PHARYNX, EXTERNAL APPROACH, NEW TECHNOLOGY GROUP 6: ICD-10-PCS | Performed by: INTERNAL MEDICINE

## 2021-11-03 PROCEDURE — 84100 ASSAY OF PHOSPHORUS: CPT

## 2021-11-03 PROCEDURE — 85027 COMPLETE CBC AUTOMATED: CPT

## 2021-11-03 PROCEDURE — A9270 NON-COVERED ITEM OR SERVICE: HCPCS | Performed by: INTERNAL MEDICINE

## 2021-11-03 PROCEDURE — 71275 CT ANGIOGRAPHY CHEST: CPT | Mod: ME

## 2021-11-03 PROCEDURE — 80074 ACUTE HEPATITIS PANEL: CPT

## 2021-11-03 PROCEDURE — 700117 HCHG RX CONTRAST REV CODE 255: Performed by: STUDENT IN AN ORGANIZED HEALTH CARE EDUCATION/TRAINING PROGRAM

## 2021-11-03 PROCEDURE — A9270 NON-COVERED ITEM OR SERVICE: HCPCS | Performed by: HOSPITALIST

## 2021-11-03 PROCEDURE — 700102 HCHG RX REV CODE 250 W/ 637 OVERRIDE(OP): Performed by: INTERNAL MEDICINE

## 2021-11-03 PROCEDURE — 5A0935A ASSISTANCE WITH RESPIRATORY VENTILATION, LESS THAN 24 CONSECUTIVE HOURS, HIGH NASAL FLOW/VELOCITY: ICD-10-PCS | Performed by: STUDENT IN AN ORGANIZED HEALTH CARE EDUCATION/TRAINING PROGRAM

## 2021-11-03 RX ORDER — DEXTROSE MONOHYDRATE 25 G/50ML
50 INJECTION, SOLUTION INTRAVENOUS
Status: DISCONTINUED | OUTPATIENT
Start: 2021-11-03 | End: 2021-11-13 | Stop reason: HOSPADM

## 2021-11-03 RX ORDER — PROCHLORPERAZINE EDISYLATE 5 MG/ML
10 INJECTION INTRAMUSCULAR; INTRAVENOUS EVERY 6 HOURS PRN
Status: DISCONTINUED | OUTPATIENT
Start: 2021-11-03 | End: 2021-11-13 | Stop reason: HOSPADM

## 2021-11-03 RX ORDER — LORAZEPAM 2 MG/ML
0.5 INJECTION INTRAMUSCULAR EVERY 6 HOURS PRN
Status: DISCONTINUED | OUTPATIENT
Start: 2021-11-03 | End: 2021-11-13 | Stop reason: HOSPADM

## 2021-11-03 RX ADMIN — Medication 1000 UNITS: at 18:29

## 2021-11-03 RX ADMIN — ENOXAPARIN SODIUM 40 MG: 40 INJECTION SUBCUTANEOUS at 04:53

## 2021-11-03 RX ADMIN — IOHEXOL 70 ML: 350 INJECTION, SOLUTION INTRAVENOUS at 19:20

## 2021-11-03 RX ADMIN — ONDANSETRON 4 MG: 4 TABLET, ORALLY DISINTEGRATING ORAL at 04:52

## 2021-11-03 RX ADMIN — BARICITINIB 4 MG: 2 TABLET, FILM COATED ORAL at 18:29

## 2021-11-03 RX ADMIN — Medication 1000 UNITS: at 04:52

## 2021-11-03 RX ADMIN — MIDODRINE HYDROCHLORIDE 5 MG: 5 TABLET ORAL at 18:29

## 2021-11-03 RX ADMIN — DEXAMETHASONE 6 MG: 4 TABLET ORAL at 04:52

## 2021-11-03 RX ADMIN — MIDODRINE HYDROCHLORIDE 5 MG: 5 TABLET ORAL at 08:08

## 2021-11-03 RX ADMIN — MIDODRINE HYDROCHLORIDE 5 MG: 5 TABLET ORAL at 12:22

## 2021-11-03 ASSESSMENT — ENCOUNTER SYMPTOMS
SORE THROAT: 0
WHEEZING: 0
NEUROLOGICAL NEGATIVE: 1
HEMOPTYSIS: 0
FOCAL WEAKNESS: 0
BRUISES/BLEEDS EASILY: 0
WEAKNESS: 0
PALPITATIONS: 0
BLURRED VISION: 0
SPUTUM PRODUCTION: 0
NAUSEA: 1
HEARTBURN: 1
FEVER: 0
VOMITING: 0
COUGH: 1
MUSCULOSKELETAL NEGATIVE: 1
EYES NEGATIVE: 1
HEADACHES: 0
CARDIOVASCULAR NEGATIVE: 1
NERVOUS/ANXIOUS: 0
FALLS: 0
SHORTNESS OF BREATH: 1
DIZZINESS: 0
MYALGIAS: 0
ABDOMINAL PAIN: 0
DOUBLE VISION: 0

## 2021-11-03 ASSESSMENT — PAIN DESCRIPTION - PAIN TYPE: TYPE: ACUTE PAIN

## 2021-11-03 ASSESSMENT — LIFESTYLE VARIABLES: SUBSTANCE_ABUSE: 0

## 2021-11-03 NOTE — PROGRESS NOTES
Vicenta switched from high flow to non-rebreather to check if tolerates, in order to have CTA chest performed. Pulse ox sats maintaining at 93-94% on non-rebreather while in prone position. CT area staff notified is okay to proceed with plan for ordered CTA chest.

## 2021-11-03 NOTE — PROGRESS NOTES
Patient for worsening respiratory status patient on 15 L and becomes increasingly hypoxic with turning and movement.  Patient will be switched to high flow nasal cannula.

## 2021-11-03 NOTE — PROGRESS NOTES
Hospital Medicine Daily Progress Note    Date of Service  11/2/2021    Chief Complaint  Vicenta Saeed is a 66 y.o. female admitted 10/29/2021 with shortness of breath    Hospital Course  Patient is a 66 year old female with no significant past medical history who presnted to the ER on 10/30/1 with dizziness, cough and fever. She has not been vaccinated and just returned from a long visit to [facility].  She developed flu like symptoms on 10/25/21 and outpatient covid testing was negative, however a test here was positive.    Her cxr was consistent with covid pneumonia.  Procalcitonin was low but d dimer elevated, she was found to have acute hypoxic respiratory failure with an oxygen saturation of 85% on RA.    Interval Problem Update  11/2: increased from 3L to 15L. Consulted ID -- remdesivir started. Wean off oxygen. Restrict fluid. Midodrine for hypotension    Consultants/Specialty  ID    Code Status  Full Code    Disposition  Patient is not medically cleared.   Anticipate discharge to to home with close outpatient follow-up.      Review of Systems  Review of Systems   Constitutional: Negative.  Negative for chills, diaphoresis, fever, malaise/fatigue and weight loss.   HENT: Negative.  Negative for sore throat.    Eyes: Negative.  Negative for blurred vision.   Respiratory: Positive for cough and shortness of breath.    Cardiovascular: Negative.  Negative for chest pain, palpitations and leg swelling.   Gastrointestinal: Positive for nausea. Negative for abdominal pain and vomiting.   Genitourinary: Negative.  Negative for dysuria.   Musculoskeletal: Negative.  Negative for falls and myalgias.   Skin: Negative.  Negative for itching and rash.   Neurological: Negative.  Negative for dizziness, focal weakness, weakness and headaches.   Endo/Heme/Allergies: Negative.  Does not bruise/bleed easily.   Psychiatric/Behavioral: Negative for depression, substance abuse and suicidal ideas. The patient is not  nervous/anxious.    All other systems reviewed and are negative.       Physical Exam  Temp:  [35.8 °C (96.5 °F)-36.8 °C (98.3 °F)] 36.8 °C (98.3 °F)  Pulse:  [71-80] 73  Resp:  [18-26] 22  BP: ()/(49-70) 104/60  SpO2:  [87 %-96 %] 96 %    Physical Exam  Vitals and nursing note reviewed. Exam conducted with a chaperone present.   Constitutional:       General: She is not in acute distress.     Appearance: Normal appearance. She is not diaphoretic.   HENT:      Head: Normocephalic.      Nose: Nose normal.      Mouth/Throat:      Mouth: Mucous membranes are moist.   Eyes:      Pupils: Pupils are equal, round, and reactive to light.   Cardiovascular:      Rate and Rhythm: Normal rate and regular rhythm.      Pulses: Normal pulses.      Heart sounds: Normal heart sounds.   Pulmonary:      Effort: Pulmonary effort is normal.      Breath sounds: Normal breath sounds.   Abdominal:      General: Abdomen is flat. Bowel sounds are normal.      Palpations: Abdomen is soft.   Musculoskeletal:         General: No swelling or deformity. Normal range of motion.   Skin:     General: Skin is warm and dry.      Capillary Refill: Capillary refill takes less than 2 seconds.   Neurological:      General: No focal deficit present.      Mental Status: She is alert and oriented to person, place, and time.      Cranial Nerves: No cranial nerve deficit.   Psychiatric:         Mood and Affect: Mood normal.         Behavior: Behavior normal.         Fluids    Intake/Output Summary (Last 24 hours) at 11/2/2021 1750  Last data filed at 11/2/2021 0940  Gross per 24 hour   Intake 120 ml   Output 1 ml   Net 119 ml       Laboratory  Recent Labs     10/31/21  0257 11/01/21  0843 11/02/21  0244   WBC 5.9 6.9 7.7   RBC 4.36 4.07* 4.35   HEMOGLOBIN 12.9 12.0 12.8   HEMATOCRIT 38.4 36.3* 38.3   MCV 88.1 89.2 88.0   MCH 29.6 29.5 29.4   MCHC 33.6 33.1* 33.4*   RDW 42.1 42.2 42.0   PLATELETCT 141* 158* 189   MPV 10.6 10.6 10.6     Recent Labs      10/31/21  0257 10/31/21  0257 11/01/21  0843 11/02/21  0244 11/02/21  1635   SODIUM 136  --   --  138 136   POTASSIUM 3.9  --   --  3.8 4.5   CHLORIDE 103  --   --  103 102   CO2 23  --   --  24 22   GLUCOSE 105*  --   --  103* 135*   BUN 11  --   --  10 10   CREATININE 0.67  --   --  0.72 0.61   CALCIUM 8.4*   < > 8.3* 8.7 8.3*    < > = values in this interval not displayed.                   Imaging  DX-CHEST-LIMITED (1 VIEW)   Final Result         1.  There is some decrease in opacification laterally in the left lung compared to the prior radiograph.      2.  Some persistent opacifications are noted which are most prominent in the right upper lobe consistent with pneumonia.      3.  No new infiltrates or consolidations are identified.      US-EXTREMITY VENOUS LOWER BILAT   Final Result      DX-CHEST-PORTABLE (1 VIEW)   Final Result         1.  Patchy bilateral pulmonary infiltrates   2.  Atherosclerosis           Assessment/Plan  * Acute hypoxemic respiratory failure due to COVID-19 (HCC)  Assessment & Plan  15L -- wean off  remdesivir  Decadron  Lasix when BP improved    Hypocalcemia  Assessment & Plan  Will replace with tums  Check albumin  following    Thrombocytopenia (HCC)  Assessment & Plan  Mild  Improved overnight  following    Hypotension due to hypovolemia- (present on admission)  Assessment & Plan  Avoid excess fluid due to fluid  midodrine    Acute respiratory failure with hypoxia (HCC)- (present on admission)  Assessment & Plan  Acute hypoxic respiratory failure due to COVID-19 PNA   See above       VTE prophylaxis: enoxaparin ppx    I have performed a physical exam and reviewed and updated ROS and Plan today (11/2/2021). In review of yesterday's note (11/1/2021), there are no changes except as documented above.

## 2021-11-03 NOTE — ASSESSMENT & PLAN NOTE
"Plan of care discussed with patient -- optimize COVID treatment, wean off HFNC support, patient stated \"I will come out of this\" and requests to keep as full code status  FULL code status confirmed  "

## 2021-11-03 NOTE — CARE PLAN
The patient is Watcher - Medium risk of patient condition declining or worsening    Shift Goals  Clinical Goals: maintain adequate oxygenation  Patient Goals: sleep  Family Goals: ANTHONY    Progress made toward(s) clinical / shift goals:      Problem: Knowledge Deficit - Standard  Goal: Patient and family/care givers will demonstrate understanding of plan of care, disease process/condition, diagnostic tests and medications  Outcome: Progressing     Problem: Respiratory  Goal: Patient will achieve/maintain optimum respiratory ventilation and gas exchange  Outcome: Progressing       Patient is not progressing towards the following goals:

## 2021-11-03 NOTE — PROGRESS NOTES
Hospital Medicine Daily Progress Note    Date of Service  11/3/2021    Chief Complaint  Vicenta Saeed is a 66 y.o. female admitted 10/29/2021 with SOB    Hospital Course  Patient is a 66 year old female with no significant past medical history who presnted to the ER on 10/30/1 with dizziness, cough and fever. She has not been vaccinated and just returned from a long visit to [facility].  She developed flu like symptoms on 10/25/21 and outpatient covid testing was negative, however a test here was positive.    Her cxr was consistent with covid pneumonia.  Procalcitonin was low but d dimer elevated, she was found to have acute hypoxic respiratory failure with an oxygen saturation of 85% on RA.    Interval Problem Update  11/2: increased from 3L to 15L. Consulted ID -- remdesivir started. Wean off oxygen. Restrict fluid. Midodrine for hypotension    11/3: switch to HFNC overnight -- currently on 60%/30L. [facility] discussed - full code. Notified ID -- d/c remdesivir, started on Baricitinib. Still soft BP 90-100s -- continue midodrine for now, continue fluid restriction. Patient c/o poor appetite, encouraged PO intake as tolerated.    R/o PE -- CTA PE. Negative LE DVT b/l by doppler 10/31/21.    Consultants/Specialty  ID    Code Status  Full Code    Disposition  Patient is not medically cleared.   Anticipate discharge to to home with close outpatient follow-up.      Review of Systems  Review of Systems   Constitutional: Positive for malaise/fatigue. Negative for fever.        Poor appetite   HENT: Negative.  Negative for nosebleeds and sore throat.    Eyes: Negative.  Negative for blurred vision and double vision.   Respiratory: Positive for cough and shortness of breath. Negative for hemoptysis, sputum production and wheezing.    Cardiovascular: Negative.  Negative for chest pain, palpitations and leg swelling.   Gastrointestinal: Positive for heartburn and nausea. Negative for abdominal pain and vomiting.    Genitourinary: Negative.  Negative for dysuria.   Musculoskeletal: Negative.  Negative for falls and myalgias.   Skin: Negative.  Negative for itching and rash.   Neurological: Negative.  Negative for dizziness, focal weakness, weakness and headaches.   Endo/Heme/Allergies: Negative.  Does not bruise/bleed easily.   Psychiatric/Behavioral: Negative for substance abuse. The patient is not nervous/anxious.    All other systems reviewed and are negative.       Physical Exam  Temp:  [36.3 °C (97.4 °F)-37.3 °C (99.2 °F)] 36.3 °C (97.4 °F)  Pulse:  [56-77] 72  Resp:  [18-28] 18  BP: ()/(58-61) 99/60  SpO2:  [64 %-96 %] 95 %    Physical Exam  Vitals and nursing note reviewed. Exam conducted with a chaperone present.   Constitutional:       Appearance: She is ill-appearing.   HENT:      Head: Normocephalic.      Nose: Nose normal.      Mouth/Throat:      Mouth: Mucous membranes are moist.      Pharynx: Oropharynx is clear.   Eyes:      General: No scleral icterus.     Pupils: Pupils are equal, round, and reactive to light.   Cardiovascular:      Rate and Rhythm: Normal rate and regular rhythm.      Pulses: Normal pulses.      Heart sounds: Normal heart sounds.   Pulmonary:      Breath sounds: No stridor. No rhonchi.      Comments: On HFNC  Inspiratory rales  Mild appreciable crackles  Decreased bibasilar breath sounds  Abdominal:      General: There is no distension.      Palpations: Abdomen is soft.      Tenderness: There is no abdominal tenderness. There is no guarding or rebound.   Musculoskeletal:         General: No swelling or deformity. Normal range of motion.      Cervical back: Neck supple. No tenderness.   Skin:     General: Skin is warm and dry.      Capillary Refill: Capillary refill takes less than 2 seconds.   Neurological:      General: No focal deficit present.      Mental Status: She is alert and oriented to person, place, and time.      Cranial Nerves: No cranial nerve deficit.   Psychiatric:          Mood and Affect: Mood normal.         Behavior: Behavior normal.         Fluids  No intake or output data in the 24 hours ending 11/03/21 1324    Laboratory  Recent Labs     11/01/21  0843 11/02/21  0244 11/03/21  0345   WBC 6.9 7.7 9.3   RBC 4.07* 4.35 4.33   HEMOGLOBIN 12.0 12.8 13.0   HEMATOCRIT 36.3* 38.3 38.8   MCV 89.2 88.0 89.6   MCH 29.5 29.4 30.0   MCHC 33.1* 33.4* 33.5*   RDW 42.2 42.0 42.6   PLATELETCT 158* 189 220   MPV 10.6 10.6 10.6     Recent Labs     11/02/21  0244 11/02/21  1635 11/03/21  0345   SODIUM 138 136 137   POTASSIUM 3.8 4.5 3.8   CHLORIDE 103 102 103   CO2 24 22 21   GLUCOSE 103* 135* 93   BUN 10 10 16   CREATININE 0.72 0.61 0.60   CALCIUM 8.7 8.3* 8.5                   Imaging  DX-CHEST-LIMITED (1 VIEW)   Final Result         1.  There is some decrease in opacification laterally in the left lung compared to the prior radiograph.      2.  Some persistent opacifications are noted which are most prominent in the right upper lobe consistent with pneumonia.      3.  No new infiltrates or consolidations are identified.      US-EXTREMITY VENOUS LOWER BILAT   Final Result      DX-CHEST-PORTABLE (1 VIEW)   Final Result         1.  Patchy bilateral pulmonary infiltrates   2.  Atherosclerosis      CT-CTA CHEST PULMONARY ARTERY W/ RECONS    (Results Pending)        Assessment/Plan  * Acute hypoxemic respiratory failure due to COVID-19 (HCC)  Assessment & Plan  +Ag 10/29, +PCR 11/2  3L 10/30, 15L 11/2, HFNC 11/3 -- wean off as able to tolerated  Decadron 6mg x10 doses  S/p remdesivir x1 dose 11/2  Baricitinib started 11/3    Procal WNL  R/o PE  Lasix when BP improved    Acute respiratory failure with hypoxia (HCC)- (present on admission)  Assessment & Plan  Acute hypoxic respiratory failure due to COVID-19 PNA   US doppler negative DVT b/l 10/31  F/u CTA PE    Hypotension due to hypovolemia- (present on admission)  Assessment & Plan  Avoid excess fluid due to fluid  midodrine    ACP (advance care  "planning)  Assessment & Plan  Plan of care discussed with patient -- optimize COVID treatment, wean off HFNC support, patient stated \"I will come out of this\" and requests to keep as full code status  FULL code status confirmed  ACP: 17minutes    Transaminitis  Assessment & Plan  Mild, likely due to COVID    Hypocalcemia  Assessment & Plan  resolved    Thrombocytopenia (HCC)  Assessment & Plan  resolved       VTE prophylaxis: enoxaparin ppx    I have performed a physical exam and reviewed and updated ROS and Plan today (11/3/2021). In review of yesterday's note (11/2/2021), there are no changes except as documented above.    Critical care time: 35minutes spent in chart review, medical management, coordinating care with patient/RN/RT/pharmacy/consultant  "

## 2021-11-03 NOTE — PROGRESS NOTES
ID COVID 19 Assessment and Medication review    COVID-19 pneumonia, unvaccinated  Hypoxemia secondary to above, now on high flow nasal cannula  HGB >8   ANC: >1000   ALC: >200   Plat: >50   GFR: >30   ALT: <250  PCT: 0.05 on 10/31   NO known prior active immune suppression per chart review   NO known VTE in last 2 weeks  NO known active serious infection other than Covid pneumonia        Plan:      -Continue supportive care with oxygen supplementation, proning, judicious use of IV fluids  -Monitor inflammatory markers   -Monitor d-dimer, ppx anticoagulation recommended unless high risk for bleeding - low threshold to initiate work-up for VTE's-if the patient is initiated on Baricitinib then DVT prophylaxis is required unless contraindicated  -Agree with dexamethasone 6 mg daily for planned 10-day course    -The patient has progressed to high flow nasal cannula requirements and meets [facility] criteria for Baricitinib.  Stopped remdesivir in order to baricitinib.    -If the patient is cleared for discharge then stop Baricitinib.  Do not keep in the hospital to complete course    -Please obtain a daily CBC and CMP if on Baricitinib.  If labs follow outside the recommended ranges as per the pharmacotherapy protocol then stop Baricitinib     -Monitor for any secondary bacterial infections including pneumonia.     -Monitor for GI bleed on tocilizumab and monitor for VTE on Baricitinib     -Ordered TB quant and acute hepatitis panel for monitoring purposes.      Che Mcbride MD  Infectious Diseases

## 2021-11-04 PROBLEM — K21.9 GERD (GASTROESOPHAGEAL REFLUX DISEASE): Status: ACTIVE | Noted: 2021-11-04

## 2021-11-04 LAB
ALBUMIN SERPL BCP-MCNC: 3.2 G/DL (ref 3.2–4.9)
ALBUMIN/GLOB SERPL: 1 G/DL
ALP SERPL-CCNC: 73 U/L (ref 30–99)
ALT SERPL-CCNC: 49 U/L (ref 2–50)
ANION GAP SERPL CALC-SCNC: 10 MMOL/L (ref 7–16)
AST SERPL-CCNC: 42 U/L (ref 12–45)
BACTERIA BLD CULT: NORMAL
BACTERIA BLD CULT: NORMAL
BASOPHILS # BLD AUTO: 0.2 % (ref 0–1.8)
BASOPHILS # BLD: 0.02 K/UL (ref 0–0.12)
BILIRUB SERPL-MCNC: 0.4 MG/DL (ref 0.1–1.5)
BUN SERPL-MCNC: 17 MG/DL (ref 8–22)
CALCIUM SERPL-MCNC: 8.7 MG/DL (ref 8.5–10.5)
CHLORIDE SERPL-SCNC: 102 MMOL/L (ref 96–112)
CO2 SERPL-SCNC: 23 MMOL/L (ref 20–33)
CREAT SERPL-MCNC: 0.66 MG/DL (ref 0.5–1.4)
EOSINOPHIL # BLD AUTO: 0.05 K/UL (ref 0–0.51)
EOSINOPHIL NFR BLD: 0.5 % (ref 0–6.9)
ERYTHROCYTE [DISTWIDTH] IN BLOOD BY AUTOMATED COUNT: 42.5 FL (ref 35.9–50)
GAMMA INTERFERON BACKGROUND BLD IA-ACNC: 0.04 IU/ML
GLOBULIN SER CALC-MCNC: 3.3 G/DL (ref 1.9–3.5)
GLUCOSE SERPL-MCNC: 98 MG/DL (ref 65–99)
HCT VFR BLD AUTO: 38.5 % (ref 37–47)
HGB BLD-MCNC: 13.1 G/DL (ref 12–16)
IMM GRANULOCYTES # BLD AUTO: 0.06 K/UL (ref 0–0.11)
IMM GRANULOCYTES NFR BLD AUTO: 0.5 % (ref 0–0.9)
LYMPHOCYTES # BLD AUTO: 2.41 K/UL (ref 1–4.8)
LYMPHOCYTES NFR BLD: 21.8 % (ref 22–41)
M TB IFN-G BLD-IMP: NEGATIVE
M TB IFN-G CD4+ BCKGRND COR BLD-ACNC: 0 IU/ML
MAGNESIUM SERPL-MCNC: 2.2 MG/DL (ref 1.5–2.5)
MCH RBC QN AUTO: 30.6 PG (ref 27–33)
MCHC RBC AUTO-ENTMCNC: 34 G/DL (ref 33.6–35)
MCV RBC AUTO: 90 FL (ref 81.4–97.8)
MITOGEN IGNF BCKGRD COR BLD-ACNC: >10 IU/ML
MONOCYTES # BLD AUTO: 0.33 K/UL (ref 0–0.85)
MONOCYTES NFR BLD AUTO: 3 % (ref 0–13.4)
NEUTROPHILS # BLD AUTO: 8.2 K/UL (ref 2–7.15)
NEUTROPHILS NFR BLD: 74 % (ref 44–72)
NRBC # BLD AUTO: 0 K/UL
NRBC BLD-RTO: 0 /100 WBC
PHOSPHATE SERPL-MCNC: 3.1 MG/DL (ref 2.5–4.5)
PLATELET # BLD AUTO: 252 K/UL (ref 164–446)
PMV BLD AUTO: 10.1 FL (ref 9–12.9)
POTASSIUM SERPL-SCNC: 3.7 MMOL/L (ref 3.6–5.5)
PROT SERPL-MCNC: 6.5 G/DL (ref 6–8.2)
QFT TB2 - NIL TBQ2: 0 IU/ML
RBC # BLD AUTO: 4.28 M/UL (ref 4.2–5.4)
SIGNIFICANT IND 70042: NORMAL
SIGNIFICANT IND 70042: NORMAL
SITE SITE: NORMAL
SITE SITE: NORMAL
SODIUM SERPL-SCNC: 135 MMOL/L (ref 135–145)
SOURCE SOURCE: NORMAL
SOURCE SOURCE: NORMAL
WBC # BLD AUTO: 11.1 K/UL (ref 4.8–10.8)

## 2021-11-04 PROCEDURE — 700102 HCHG RX REV CODE 250 W/ 637 OVERRIDE(OP): Performed by: STUDENT IN AN ORGANIZED HEALTH CARE EDUCATION/TRAINING PROGRAM

## 2021-11-04 PROCEDURE — 85025 COMPLETE CBC W/AUTO DIFF WBC: CPT

## 2021-11-04 PROCEDURE — A9270 NON-COVERED ITEM OR SERVICE: HCPCS | Performed by: HOSPITALIST

## 2021-11-04 PROCEDURE — 700102 HCHG RX REV CODE 250 W/ 637 OVERRIDE(OP): Performed by: INTERNAL MEDICINE

## 2021-11-04 PROCEDURE — 99233 SBSQ HOSP IP/OBS HIGH 50: CPT | Performed by: STUDENT IN AN ORGANIZED HEALTH CARE EDUCATION/TRAINING PROGRAM

## 2021-11-04 PROCEDURE — A9270 NON-COVERED ITEM OR SERVICE: HCPCS | Performed by: STUDENT IN AN ORGANIZED HEALTH CARE EDUCATION/TRAINING PROGRAM

## 2021-11-04 PROCEDURE — A9270 NON-COVERED ITEM OR SERVICE: HCPCS | Performed by: INTERNAL MEDICINE

## 2021-11-04 PROCEDURE — 770001 HCHG ROOM/CARE - MED/SURG/GYN PRIV*

## 2021-11-04 PROCEDURE — 94760 N-INVAS EAR/PLS OXIMETRY 1: CPT

## 2021-11-04 PROCEDURE — 83735 ASSAY OF MAGNESIUM: CPT

## 2021-11-04 PROCEDURE — 700102 HCHG RX REV CODE 250 W/ 637 OVERRIDE(OP): Performed by: HOSPITALIST

## 2021-11-04 PROCEDURE — 36415 COLL VENOUS BLD VENIPUNCTURE: CPT

## 2021-11-04 PROCEDURE — 700111 HCHG RX REV CODE 636 W/ 250 OVERRIDE (IP): Performed by: STUDENT IN AN ORGANIZED HEALTH CARE EDUCATION/TRAINING PROGRAM

## 2021-11-04 PROCEDURE — 94640 AIRWAY INHALATION TREATMENT: CPT

## 2021-11-04 PROCEDURE — 84100 ASSAY OF PHOSPHORUS: CPT

## 2021-11-04 PROCEDURE — 80053 COMPREHEN METABOLIC PANEL: CPT

## 2021-11-04 RX ORDER — FAMOTIDINE 20 MG/1
20 TABLET, FILM COATED ORAL 2 TIMES DAILY
Status: DISCONTINUED | OUTPATIENT
Start: 2021-11-04 | End: 2021-11-13 | Stop reason: HOSPADM

## 2021-11-04 RX ADMIN — DEXAMETHASONE 6 MG: 4 TABLET ORAL at 08:06

## 2021-11-04 RX ADMIN — FAMOTIDINE 20 MG: 20 TABLET ORAL at 18:00

## 2021-11-04 RX ADMIN — ENOXAPARIN SODIUM 40 MG: 40 INJECTION SUBCUTANEOUS at 04:30

## 2021-11-04 RX ADMIN — MIDODRINE HYDROCHLORIDE 5 MG: 5 TABLET ORAL at 17:42

## 2021-11-04 RX ADMIN — MIDODRINE HYDROCHLORIDE 5 MG: 5 TABLET ORAL at 13:37

## 2021-11-04 RX ADMIN — Medication 1000 UNITS: at 17:42

## 2021-11-04 RX ADMIN — BARICITINIB 4 MG: 2 TABLET, FILM COATED ORAL at 17:42

## 2021-11-04 ASSESSMENT — LIFESTYLE VARIABLES: SUBSTANCE_ABUSE: 0

## 2021-11-04 ASSESSMENT — FIBROSIS 4 INDEX: FIB4 SCORE: 1.619047619047619048

## 2021-11-04 ASSESSMENT — ENCOUNTER SYMPTOMS
COUGH: 1
BRUISES/BLEEDS EASILY: 0
ABDOMINAL PAIN: 0
FEVER: 0
SHORTNESS OF BREATH: 1
PALPITATIONS: 0
DOUBLE VISION: 0
HEARTBURN: 1
FOCAL WEAKNESS: 0
WHEEZING: 0
FALLS: 0
HEMOPTYSIS: 0
VOMITING: 0
NERVOUS/ANXIOUS: 0
DIZZINESS: 0
EYES NEGATIVE: 1
HEADACHES: 0
SORE THROAT: 0
NAUSEA: 1
MYALGIAS: 0
BLURRED VISION: 0
MUSCULOSKELETAL NEGATIVE: 1
CARDIOVASCULAR NEGATIVE: 1
WEAKNESS: 0
NEUROLOGICAL NEGATIVE: 1
SPUTUM PRODUCTION: 0

## 2021-11-04 NOTE — CARE PLAN
The patient is Watcher - Medium risk of patient condition declining or worsening    Shift Goals  Clinical Goals: reduce nausea and vomiting; encourage better po intake; maintain adequate oxygenation  Patient Goals: to go home  Family Goals: n/a    Progress made toward(s) clinical / shift goals:  Maintaining pulse ox sats on oxymask at 15L when in prone position.  Does desat to 78-79% with oxymask when lying supine, so advised to either get into prone position, or must switch back to high flow O2.  Vicenta prefers wearing the oxymask, so she is compliant with proning.      Patient is not progressing towards the following goals: Still requiring O2 to maintain adequate oxygenation. Receiving baricitnib and dexamethasone.         Problem: Respiratory  Goal: Patient will achieve/maintain optimum respiratory ventilation and gas exchange  Outcome: Progressing

## 2021-11-04 NOTE — PROGRESS NOTES
Pt reports severe nausea when taking PO meds on empty stomach. Snacks offered with this morning med pass, pt declined and requested PO meds be given with breakfast. Meds moved to 0800. TM.

## 2021-11-04 NOTE — PROGRESS NOTES
Pt back from CT with transport.    Bedside report received and patient care assumed. Pt is resting in bed, A&O4, with no complaints of pain, and is on 15L nonrebreather. Pt is medical. All fall precautions are in place, belongings at bedside table.  Pt was updated on POC, no questions or concerns. Pt educated on use of call light for assistance. Will continue to monitor.     Crisis Charting: COVID-19 surge in effect

## 2021-11-04 NOTE — CARE PLAN
Problem: Knowledge Deficit - Standard  Goal: Patient and family/care givers will demonstrate understanding of plan of care, disease process/condition, diagnostic tests and medications  Outcome: Progressing     Problem: Respiratory  Goal: Patient will achieve/maintain optimum respiratory ventilation and gas exchange  Outcome: Progressing   The patient is Watcher - Medium risk of patient condition declining or worsening    Shift Goals  Clinical Goals: stable respiratory status, increase po intake  Patient Goals: go home  Family Goals: n/a    Progress made toward(s) clinical / shift goals:  Understands poc, stable respiratory status    Patient is not progressing towards the following goals:    (covid 19 surge in effect)

## 2021-11-04 NOTE — PROGRESS NOTES
"Hospital Medicine Daily Progress Note    Date of Service  11/4/2021    Chief Complaint  Vicenta Saeed is a 66 y.o. female admitted 10/29/2021 with SOB    Hospital Course  Patient is a 66 year old female with no significant past medical history who presnted to the ER on 10/30/1 with dizziness, cough and fever. She has not been vaccinated and just returned from a long visit to [facility].  She developed flu like symptoms on 10/25/21 and outpatient covid testing was negative, however a test here was positive.    Her cxr was consistent with covid pneumonia.  Procalcitonin was low but d dimer elevated, she was found to have acute hypoxic respiratory failure with an oxygen saturation of 85% on RA.    Interval Problem Update  11/2: increased from 3L to 15L. Consulted ID -- remdesivir started. Wean off oxygen. Restrict fluid. Midodrine for hypotension    11/3: switch to HFNC overnight -- currently on 60%/30L. [facility]OC discussed - full code. Notified ID -- d/c remdesivir, started on Baricitinib. Still soft BP 90-100s -- continue midodrine for now, continue fluid restriction. Patient c/o poor appetite, encouraged PO intake as tolerated.    R/o PE -- CTA PE. Negative LE DVT b/l by doppler 10/31/21.    11/4: CTA PE negative. Patient now off HFNC -- from 30L to 10L at time of evaluation. GOC discussed - discharge when oxygen requirement <6L. C/o \"upset stomach\" - pepcid added.    Consultants/Specialty  ID    Code Status  Full Code    Disposition  Patient is not medically cleared.   Anticipate discharge to to home with close outpatient follow-up.      Review of Systems  Review of Systems   Constitutional: Positive for malaise/fatigue. Negative for fever.        Poor appetite   HENT: Negative.  Negative for nosebleeds and sore throat.    Eyes: Negative.  Negative for blurred vision and double vision.   Respiratory: Positive for cough and shortness of breath. Negative for hemoptysis, sputum production and wheezing.    Cardiovascular: " Negative.  Negative for chest pain, palpitations and leg swelling.   Gastrointestinal: Positive for heartburn and nausea. Negative for abdominal pain and vomiting.   Genitourinary: Negative.  Negative for dysuria.   Musculoskeletal: Negative.  Negative for falls and myalgias.   Skin: Negative.  Negative for itching and rash.   Neurological: Negative.  Negative for dizziness, focal weakness, weakness and headaches.   Endo/Heme/Allergies: Negative.  Does not bruise/bleed easily.   Psychiatric/Behavioral: Negative for substance abuse. The patient is not nervous/anxious.    All other systems reviewed and are negative.       Physical Exam  Temp:  [36.3 °C (97.4 °F)-37.1 °C (98.7 °F)] 37.1 °C (98.7 °F)  Pulse:  [56-77] 76  Resp:  [18-21] 21  BP: ()/(48-60) 85/48  SpO2:  [90 %-97 %] 94 %    Physical Exam  Vitals and nursing note reviewed. Exam conducted with a chaperone present.   Constitutional:       Appearance: She is ill-appearing.   HENT:      Head: Normocephalic.      Nose: Nose normal.      Mouth/Throat:      Mouth: Mucous membranes are moist.      Pharynx: Oropharynx is clear.   Eyes:      General: No scleral icterus.     Pupils: Pupils are equal, round, and reactive to light.   Cardiovascular:      Rate and Rhythm: Normal rate and regular rhythm.      Pulses: Normal pulses.      Heart sounds: Normal heart sounds.   Pulmonary:      Breath sounds: No stridor. No rhonchi.      Comments: On HFNC  Inspiratory rales  Mild appreciable crackles  Decreased bibasilar breath sounds  Abdominal:      General: There is no distension.      Palpations: Abdomen is soft.      Tenderness: There is no abdominal tenderness. There is no guarding or rebound.   Musculoskeletal:         General: No swelling or deformity. Normal range of motion.      Cervical back: Neck supple. No tenderness.   Skin:     General: Skin is warm and dry.      Capillary Refill: Capillary refill takes less than 2 seconds.   Neurological:      General: No  focal deficit present.      Mental Status: She is alert and oriented to person, place, and time.      Cranial Nerves: No cranial nerve deficit.   Psychiatric:         Mood and Affect: Mood normal.         Behavior: Behavior normal.         Fluids    Intake/Output Summary (Last 24 hours) at 11/4/2021 1458  Last data filed at 11/4/2021 1300  Gross per 24 hour   Intake 200 ml   Output --   Net 200 ml       Laboratory  Recent Labs     11/02/21  0244 11/03/21  0345 11/04/21  0516   WBC 7.7 9.3 11.1*   RBC 4.35 4.33 4.28   HEMOGLOBIN 12.8 13.0 13.1   HEMATOCRIT 38.3 38.8 38.5   MCV 88.0 89.6 90.0   MCH 29.4 30.0 30.6   MCHC 33.4* 33.5* 34.0   RDW 42.0 42.6 42.5   PLATELETCT 189 220 252   MPV 10.6 10.6 10.1     Recent Labs     11/02/21  1635 11/03/21  0345 11/04/21  0516   SODIUM 136 137 135   POTASSIUM 4.5 3.8 3.7   CHLORIDE 102 103 102   CO2 22 21 23   GLUCOSE 135* 93 98   BUN 10 16 17   CREATININE 0.61 0.60 0.66   CALCIUM 8.3* 8.5 8.7                   Imaging  CT-CTA CHEST PULMONARY ARTERY W/ RECONS   Final Result         1. No CT evidence of pulmonary embolism.      2. Extensive groundglass and airspace opacity throughout both lungs, in keeping with Covid 19 pneumonia.         DX-CHEST-LIMITED (1 VIEW)   Final Result         1.  There is some decrease in opacification laterally in the left lung compared to the prior radiograph.      2.  Some persistent opacifications are noted which are most prominent in the right upper lobe consistent with pneumonia.      3.  No new infiltrates or consolidations are identified.      US-EXTREMITY VENOUS LOWER BILAT   Final Result      DX-CHEST-PORTABLE (1 VIEW)   Final Result         1.  Patchy bilateral pulmonary infiltrates   2.  Atherosclerosis           Assessment/Plan  * Acute hypoxemic respiratory failure due to COVID-19 ([facility])  Assessment & Plan  +Ag 10/29, +PCR 11/2  3L 10/30, 15L 11/2, HFNC 11/3 -- wean off as able to tolerated  Decadron 6mg x10 doses  S/p remdesivir x1 dose  "11/2  Baricitinib started 11/3    Procal WNL  R/o PE  Lasix when BP improved    Acute respiratory failure with hypoxia (HCC)- (present on admission)  Assessment & Plan  Acute hypoxic respiratory failure due to COVID-19 PNA   US doppler negative DVT b/l 10/31  F/u CTA PE    Hypotension due to hypovolemia- (present on admission)  Assessment & Plan  Avoid excess fluid due to fluid  midodrine    GERD (gastroesophageal reflux disease)  Assessment & Plan  pepcid    ACP (advance care planning)  Assessment & Plan  Plan of care discussed with patient -- optimize COVID treatment, wean off HFNC support, patient stated \"I will come out of this\" and requests to keep as full code status  FULL code status confirmed    Transaminitis  Assessment & Plan  Mild, likely due to COVID    Hypocalcemia  Assessment & Plan  resolved    Thrombocytopenia (HCC)  Assessment & Plan  resolved       VTE prophylaxis: enoxaparin ppx    I have performed a physical exam and reviewed and updated ROS and Plan today (11/4/2021). In review of yesterday's note (11/3/2021), there are no changes except as documented above.  "

## 2021-11-04 NOTE — CARE PLAN
The patient is Stable - Low risk of patient condition declining or worsening    Shift Goals  Clinical Goals: reduce nausea and vomiting; encourage better po intake; maintain adequate oxygenation  Patient Goals: to go home  Family Goals: n/a      Problem: Knowledge Deficit - Standard  Goal: Patient and family/care givers will demonstrate understanding of plan of care, disease process/condition, diagnostic tests and medications  Outcome: Progressing     Problem: Respiratory  Goal: Patient will achieve/maintain optimum respiratory ventilation and gas exchange  Outcome: Progressing

## 2021-11-05 LAB
ALBUMIN SERPL BCP-MCNC: 3.5 G/DL (ref 3.2–4.9)
ALBUMIN/GLOB SERPL: 0.9 G/DL
ALP SERPL-CCNC: 84 U/L (ref 30–99)
ALT SERPL-CCNC: 47 U/L (ref 2–50)
ANION GAP SERPL CALC-SCNC: 15 MMOL/L (ref 7–16)
AST SERPL-CCNC: 43 U/L (ref 12–45)
BASOPHILS # BLD AUTO: 0.1 % (ref 0–1.8)
BASOPHILS # BLD: 0.01 K/UL (ref 0–0.12)
BILIRUB SERPL-MCNC: 0.6 MG/DL (ref 0.1–1.5)
BUN SERPL-MCNC: 16 MG/DL (ref 8–22)
CALCIUM SERPL-MCNC: 9 MG/DL (ref 8.5–10.5)
CHLORIDE SERPL-SCNC: 102 MMOL/L (ref 96–112)
CO2 SERPL-SCNC: 20 MMOL/L (ref 20–33)
CREAT SERPL-MCNC: 0.59 MG/DL (ref 0.5–1.4)
EOSINOPHIL # BLD AUTO: 0.08 K/UL (ref 0–0.51)
EOSINOPHIL NFR BLD: 0.8 % (ref 0–6.9)
ERYTHROCYTE [DISTWIDTH] IN BLOOD BY AUTOMATED COUNT: 42.3 FL (ref 35.9–50)
GLOBULIN SER CALC-MCNC: 3.8 G/DL (ref 1.9–3.5)
GLUCOSE SERPL-MCNC: 105 MG/DL (ref 65–99)
HCT VFR BLD AUTO: 41 % (ref 37–47)
HGB BLD-MCNC: 13.4 G/DL (ref 12–16)
IMM GRANULOCYTES # BLD AUTO: 0.08 K/UL (ref 0–0.11)
IMM GRANULOCYTES NFR BLD AUTO: 0.8 % (ref 0–0.9)
LYMPHOCYTES # BLD AUTO: 2.65 K/UL (ref 1–4.8)
LYMPHOCYTES NFR BLD: 28 % (ref 22–41)
MAGNESIUM SERPL-MCNC: 2.3 MG/DL (ref 1.5–2.5)
MCH RBC QN AUTO: 29.3 PG (ref 27–33)
MCHC RBC AUTO-ENTMCNC: 32.7 G/DL (ref 33.6–35)
MCV RBC AUTO: 89.7 FL (ref 81.4–97.8)
MONOCYTES # BLD AUTO: 0.34 K/UL (ref 0–0.85)
MONOCYTES NFR BLD AUTO: 3.6 % (ref 0–13.4)
NEUTROPHILS # BLD AUTO: 6.32 K/UL (ref 2–7.15)
NEUTROPHILS NFR BLD: 66.7 % (ref 44–72)
NRBC # BLD AUTO: 0 K/UL
NRBC BLD-RTO: 0 /100 WBC
PHOSPHATE SERPL-MCNC: 3.6 MG/DL (ref 2.5–4.5)
PLATELET # BLD AUTO: 302 K/UL (ref 164–446)
PMV BLD AUTO: 10.5 FL (ref 9–12.9)
POTASSIUM SERPL-SCNC: 3.9 MMOL/L (ref 3.6–5.5)
PROT SERPL-MCNC: 7.3 G/DL (ref 6–8.2)
RBC # BLD AUTO: 4.57 M/UL (ref 4.2–5.4)
SODIUM SERPL-SCNC: 137 MMOL/L (ref 135–145)
WBC # BLD AUTO: 9.5 K/UL (ref 4.8–10.8)

## 2021-11-05 PROCEDURE — 700102 HCHG RX REV CODE 250 W/ 637 OVERRIDE(OP): Performed by: HOSPITALIST

## 2021-11-05 PROCEDURE — 700111 HCHG RX REV CODE 636 W/ 250 OVERRIDE (IP): Performed by: STUDENT IN AN ORGANIZED HEALTH CARE EDUCATION/TRAINING PROGRAM

## 2021-11-05 PROCEDURE — 85025 COMPLETE CBC W/AUTO DIFF WBC: CPT

## 2021-11-05 PROCEDURE — 80053 COMPREHEN METABOLIC PANEL: CPT

## 2021-11-05 PROCEDURE — A9270 NON-COVERED ITEM OR SERVICE: HCPCS | Performed by: STUDENT IN AN ORGANIZED HEALTH CARE EDUCATION/TRAINING PROGRAM

## 2021-11-05 PROCEDURE — 770001 HCHG ROOM/CARE - MED/SURG/GYN PRIV*

## 2021-11-05 PROCEDURE — 700102 HCHG RX REV CODE 250 W/ 637 OVERRIDE(OP): Performed by: INTERNAL MEDICINE

## 2021-11-05 PROCEDURE — 36415 COLL VENOUS BLD VENIPUNCTURE: CPT

## 2021-11-05 PROCEDURE — 83735 ASSAY OF MAGNESIUM: CPT

## 2021-11-05 PROCEDURE — 700102 HCHG RX REV CODE 250 W/ 637 OVERRIDE(OP): Performed by: STUDENT IN AN ORGANIZED HEALTH CARE EDUCATION/TRAINING PROGRAM

## 2021-11-05 PROCEDURE — 84100 ASSAY OF PHOSPHORUS: CPT

## 2021-11-05 PROCEDURE — A9270 NON-COVERED ITEM OR SERVICE: HCPCS | Performed by: INTERNAL MEDICINE

## 2021-11-05 PROCEDURE — 99233 SBSQ HOSP IP/OBS HIGH 50: CPT | Performed by: STUDENT IN AN ORGANIZED HEALTH CARE EDUCATION/TRAINING PROGRAM

## 2021-11-05 PROCEDURE — A9270 NON-COVERED ITEM OR SERVICE: HCPCS | Performed by: HOSPITALIST

## 2021-11-05 RX ORDER — BISACODYL 10 MG
10 SUPPOSITORY, RECTAL RECTAL
Status: DISCONTINUED | OUTPATIENT
Start: 2021-11-05 | End: 2021-11-13 | Stop reason: HOSPADM

## 2021-11-05 RX ORDER — POLYETHYLENE GLYCOL 3350 17 G/17G
1 POWDER, FOR SOLUTION ORAL
Status: DISCONTINUED | OUTPATIENT
Start: 2021-11-05 | End: 2021-11-13 | Stop reason: HOSPADM

## 2021-11-05 RX ORDER — AMOXICILLIN 250 MG
2 CAPSULE ORAL 2 TIMES DAILY
Status: DISCONTINUED | OUTPATIENT
Start: 2021-11-05 | End: 2021-11-13 | Stop reason: HOSPADM

## 2021-11-05 RX ORDER — MIDODRINE HYDROCHLORIDE 5 MG/1
10 TABLET ORAL
Status: DISCONTINUED | OUTPATIENT
Start: 2021-11-05 | End: 2021-11-13 | Stop reason: HOSPADM

## 2021-11-05 RX ADMIN — ACETAMINOPHEN 650 MG: 325 TABLET ORAL at 20:50

## 2021-11-05 RX ADMIN — DOCUSATE SODIUM 50 MG AND SENNOSIDES 8.6 MG 2 TABLET: 8.6; 5 TABLET, FILM COATED ORAL at 20:50

## 2021-11-05 RX ADMIN — FAMOTIDINE 20 MG: 20 TABLET ORAL at 17:09

## 2021-11-05 RX ADMIN — BARICITINIB 4 MG: 2 TABLET, FILM COATED ORAL at 17:09

## 2021-11-05 RX ADMIN — Medication 1000 UNITS: at 08:41

## 2021-11-05 RX ADMIN — MIDODRINE HYDROCHLORIDE 10 MG: 5 TABLET ORAL at 08:40

## 2021-11-05 RX ADMIN — MIDODRINE HYDROCHLORIDE 10 MG: 5 TABLET ORAL at 17:09

## 2021-11-05 RX ADMIN — DEXAMETHASONE 6 MG: 4 TABLET ORAL at 05:15

## 2021-11-05 RX ADMIN — FAMOTIDINE 20 MG: 20 TABLET ORAL at 05:15

## 2021-11-05 RX ADMIN — Medication 1000 UNITS: at 17:09

## 2021-11-05 RX ADMIN — MIDODRINE HYDROCHLORIDE 10 MG: 5 TABLET ORAL at 12:01

## 2021-11-05 RX ADMIN — ENOXAPARIN SODIUM 40 MG: 40 INJECTION SUBCUTANEOUS at 05:15

## 2021-11-05 RX ADMIN — MIDODRINE HYDROCHLORIDE 10 MG: 5 TABLET ORAL at 05:15

## 2021-11-05 ASSESSMENT — ENCOUNTER SYMPTOMS
COUGH: 1
VOMITING: 0
DIZZINESS: 0
NERVOUS/ANXIOUS: 0
WEAKNESS: 0
HEARTBURN: 1
HEMOPTYSIS: 0
BLURRED VISION: 0
HEADACHES: 0
SHORTNESS OF BREATH: 1
CARDIOVASCULAR NEGATIVE: 1
MUSCULOSKELETAL NEGATIVE: 1
BRUISES/BLEEDS EASILY: 0
SPUTUM PRODUCTION: 0
FOCAL WEAKNESS: 0
MYALGIAS: 0
FEVER: 0
NAUSEA: 1
EYES NEGATIVE: 1
SORE THROAT: 0
WHEEZING: 0
ABDOMINAL PAIN: 0
DOUBLE VISION: 0
PALPITATIONS: 0
NEUROLOGICAL NEGATIVE: 1
FALLS: 0

## 2021-11-05 ASSESSMENT — LIFESTYLE VARIABLES: SUBSTANCE_ABUSE: 0

## 2021-11-05 ASSESSMENT — PAIN DESCRIPTION - PAIN TYPE
TYPE: ACUTE PAIN;CHRONIC PAIN
TYPE: ACUTE PAIN

## 2021-11-05 NOTE — PROGRESS NOTES
"Hospital Medicine Daily Progress Note    Date of Service  11/5/2021    Chief Complaint  Vicenta Saeed is a 66 y.o. female admitted 10/29/2021 with SOB    Hospital Course  Patient is a 66 year old female with no significant past medical history who presnted to the ER on 10/30/1 with dizziness, cough and fever. She has not been vaccinated and just returned from a long visit to [facility].  She developed flu like symptoms on 10/25/21 and outpatient covid testing was negative, however a test here was positive.    Her cxr was consistent with covid pneumonia.  Procalcitonin was low but d dimer elevated, she was found to have acute hypoxic respiratory failure with an oxygen saturation of 85% on RA.    Interval Problem Update  11/2: increased from 3L to 15L. Consulted ID -- remdesivir started. Wean off oxygen. Restrict fluid. Midodrine for hypotension    11/3: switch to HFNC overnight -- currently on 60%/30L. GOC discussed - full code. Notified ID -- d/c remdesivir, started on Baricitinib. Still soft BP 90-100s -- continue midodrine for now, continue fluid restriction. Patient c/o poor appetite, encouraged PO intake as tolerated.    R/o PE -- CTA PE. Negative LE DVT b/l by doppler 10/31/21.    11/4: CTA PE negative. Patient now off HFNC -- from 30L to 10L at time of evaluation. GOC discussed - discharge when oxygen requirement <6L. C/o \"upset stomach\" - pepcid added.    11/5: requiring 10-15L today, but no HFNC. Midodrine dose increased to 10mg TID due to hypotension. Ativan for anxiety.    Consultants/Specialty  ID    Code Status  Full Code    Disposition  Patient is not medically cleared.   Anticipate discharge to to home with close outpatient follow-up.      Review of Systems  Review of Systems   Constitutional: Positive for malaise/fatigue. Negative for fever.        Poor appetite   HENT: Negative.  Negative for nosebleeds and sore throat.    Eyes: Negative.  Negative for blurred vision and double vision. "   Respiratory: Positive for cough and shortness of breath. Negative for hemoptysis, sputum production and wheezing.    Cardiovascular: Negative.  Negative for chest pain, palpitations and leg swelling.   Gastrointestinal: Positive for heartburn and nausea. Negative for abdominal pain and vomiting.   Genitourinary: Negative.  Negative for dysuria.   Musculoskeletal: Negative.  Negative for falls and myalgias.   Skin: Negative.  Negative for itching and rash.   Neurological: Negative.  Negative for dizziness, focal weakness, weakness and headaches.   Endo/Heme/Allergies: Negative.  Does not bruise/bleed easily.   Psychiatric/Behavioral: Negative for substance abuse. The patient is not nervous/anxious.    All other systems reviewed and are negative.       Physical Exam  Temp:  [36.3 °C (97.4 °F)-36.9 °C (98.4 °F)] 36.9 °C (98.4 °F)  Pulse:  [60-66] 60  Resp:  [20-26] 22  BP: ()/(41-68) 117/68  SpO2:  [88 %-95 %] 93 %    Physical Exam  Vitals and nursing note reviewed. Exam conducted with a chaperone present.   Constitutional:       Appearance: She is ill-appearing.   HENT:      Head: Normocephalic.      Nose: Nose normal.      Mouth/Throat:      Mouth: Mucous membranes are moist.      Pharynx: Oropharynx is clear.   Eyes:      General: No scleral icterus.     Pupils: Pupils are equal, round, and reactive to light.   Cardiovascular:      Rate and Rhythm: Normal rate and regular rhythm.      Pulses: Normal pulses.      Heart sounds: Normal heart sounds.   Pulmonary:      Breath sounds: No stridor. No rhonchi.      Comments: On HFNC  Inspiratory rales  Mild appreciable crackles  Decreased bibasilar breath sounds  Abdominal:      General: There is no distension.      Palpations: Abdomen is soft.      Tenderness: There is no abdominal tenderness. There is no guarding or rebound.   Musculoskeletal:         General: No swelling or deformity. Normal range of motion.      Cervical back: Neck supple. No tenderness.    Skin:     General: Skin is warm and dry.      Capillary Refill: Capillary refill takes less than 2 seconds.   Neurological:      General: No focal deficit present.      Mental Status: She is alert and oriented to person, place, and time.      Cranial Nerves: No cranial nerve deficit.   Psychiatric:         Mood and Affect: Mood normal.         Behavior: Behavior normal.         Fluids  No intake or output data in the 24 hours ending 11/05/21 1455    Laboratory  Recent Labs     11/03/21  0345 11/04/21  0516 11/05/21  0431   WBC 9.3 11.1* 9.5   RBC 4.33 4.28 4.57   HEMOGLOBIN 13.0 13.1 13.4   HEMATOCRIT 38.8 38.5 41.0   MCV 89.6 90.0 89.7   MCH 30.0 30.6 29.3   MCHC 33.5* 34.0 32.7*   RDW 42.6 42.5 42.3   PLATELETCT 220 252 302   MPV 10.6 10.1 10.5     Recent Labs     11/03/21  0345 11/04/21  0516 11/05/21  0431   SODIUM 137 135 137   POTASSIUM 3.8 3.7 3.9   CHLORIDE 103 102 102   CO2 21 23 20   GLUCOSE 93 98 105*   BUN 16 17 16   CREATININE 0.60 0.66 0.59   CALCIUM 8.5 8.7 9.0                   Imaging  CT-CTA CHEST PULMONARY ARTERY W/ RECONS   Final Result         1. No CT evidence of pulmonary embolism.      2. Extensive groundglass and airspace opacity throughout both lungs, in keeping with Covid 19 pneumonia.         DX-CHEST-LIMITED (1 VIEW)   Final Result         1.  There is some decrease in opacification laterally in the left lung compared to the prior radiograph.      2.  Some persistent opacifications are noted which are most prominent in the right upper lobe consistent with pneumonia.      3.  No new infiltrates or consolidations are identified.      US-EXTREMITY VENOUS LOWER BILAT   Final Result      DX-CHEST-PORTABLE (1 VIEW)   Final Result         1.  Patchy bilateral pulmonary infiltrates   2.  Atherosclerosis           Assessment/Plan  * Acute hypoxemic respiratory failure due to COVID-19 ([facility])  Assessment & Plan  +Ag 10/29, +PCR 11/2  3L 10/30, 15L 11/2, HFNC 11/3 -- wean off as able to  "tolerated  Decadron 6mg x10 doses  S/p remdesivir x1 dose 11/2  Baricitinib started 11/3    Procal WNL  Lasix when BP improved -- unable to due to hypotension    Acute respiratory failure with hypoxia (HCC)- (present on admission)  Assessment & Plan  Acute hypoxic respiratory failure due to COVID-19 PNA   US doppler negative DVT b/l 10/31  CTA PE negative PE    Hypotension due to hypovolemia- (present on admission)  Assessment & Plan  Avoid excess fluid due to fluid  midodrine    GERD (gastroesophageal reflux disease)  Assessment & Plan  pepcid    ACP (advance care planning)  Assessment & Plan  Plan of care discussed with patient -- optimize COVID treatment, wean off HFNC support, patient stated \"I will come out of this\" and requests to keep as full code status  FULL code status confirmed    Transaminitis  Assessment & Plan  Mild, likely due to COVID    Hypocalcemia  Assessment & Plan  resolved    Thrombocytopenia (HCC)  Assessment & Plan  resolved       VTE prophylaxis: enoxaparin ppx    I have performed a physical exam and reviewed and updated ROS and Plan today (11/5/2021). In review of yesterday's note (11/4/2021), there are no changes except as documented above.  "

## 2021-11-05 NOTE — CARE PLAN
Problem: Knowledge Deficit - Standard  Goal: Patient and family/care givers will demonstrate understanding of plan of care, disease process/condition, diagnostic tests and medications  Outcome: Progressing  Note: EDUCATED ON poc     Problem: Respiratory  Goal: Patient will achieve/maintain optimum respiratory ventilation and gas exchange  Outcome: Progressing  Flowsheets (Taken 11/4/2021 1954 by Edil Ahmadi, C.N.A.)  O2 Delivery Device: Oxymask   The patient is Watcher - Medium risk of patient condition declining or worsening    Shift Goals  Clinical Goals: Prone   Patient Goals: sleep   Family Goals: NA    Progress made toward(s) clinical / shift goals:  YES    Patient is not progressing towards the following goals:

## 2021-11-06 LAB
ALBUMIN SERPL BCP-MCNC: 3.3 G/DL (ref 3.2–4.9)
ALBUMIN/GLOB SERPL: 0.9 G/DL
ALP SERPL-CCNC: 88 U/L (ref 30–99)
ALT SERPL-CCNC: 56 U/L (ref 2–50)
ANION GAP SERPL CALC-SCNC: 12 MMOL/L (ref 7–16)
AST SERPL-CCNC: 62 U/L (ref 12–45)
BASOPHILS # BLD AUTO: 0.3 % (ref 0–1.8)
BASOPHILS # BLD: 0.03 K/UL (ref 0–0.12)
BILIRUB SERPL-MCNC: 0.5 MG/DL (ref 0.1–1.5)
BUN SERPL-MCNC: 17 MG/DL (ref 8–22)
CALCIUM SERPL-MCNC: 8.6 MG/DL (ref 8.5–10.5)
CHLORIDE SERPL-SCNC: 102 MMOL/L (ref 96–112)
CO2 SERPL-SCNC: 22 MMOL/L (ref 20–33)
CORTIS SERPL-MCNC: 14.7 UG/DL (ref 0–23)
CREAT SERPL-MCNC: 0.7 MG/DL (ref 0.5–1.4)
EOSINOPHIL # BLD AUTO: 0.15 K/UL (ref 0–0.51)
EOSINOPHIL NFR BLD: 1.3 % (ref 0–6.9)
ERYTHROCYTE [DISTWIDTH] IN BLOOD BY AUTOMATED COUNT: 42.5 FL (ref 35.9–50)
GLOBULIN SER CALC-MCNC: 3.6 G/DL (ref 1.9–3.5)
GLUCOSE SERPL-MCNC: 95 MG/DL (ref 65–99)
HCT VFR BLD AUTO: 40.2 % (ref 37–47)
HGB BLD-MCNC: 13.5 G/DL (ref 12–16)
IMM GRANULOCYTES # BLD AUTO: 0.13 K/UL (ref 0–0.11)
IMM GRANULOCYTES NFR BLD AUTO: 1.2 % (ref 0–0.9)
LYMPHOCYTES # BLD AUTO: 2.11 K/UL (ref 1–4.8)
LYMPHOCYTES NFR BLD: 18.7 % (ref 22–41)
MAGNESIUM SERPL-MCNC: 2.2 MG/DL (ref 1.5–2.5)
MCH RBC QN AUTO: 30.3 PG (ref 27–33)
MCHC RBC AUTO-ENTMCNC: 33.6 G/DL (ref 33.6–35)
MCV RBC AUTO: 90.1 FL (ref 81.4–97.8)
MONOCYTES # BLD AUTO: 0.28 K/UL (ref 0–0.85)
MONOCYTES NFR BLD AUTO: 2.5 % (ref 0–13.4)
NEUTROPHILS # BLD AUTO: 8.57 K/UL (ref 2–7.15)
NEUTROPHILS NFR BLD: 76 % (ref 44–72)
NRBC # BLD AUTO: 0 K/UL
NRBC BLD-RTO: 0 /100 WBC
PHOSPHATE SERPL-MCNC: 3.2 MG/DL (ref 2.5–4.5)
PLATELET # BLD AUTO: 260 K/UL (ref 164–446)
PMV BLD AUTO: 10.1 FL (ref 9–12.9)
POTASSIUM SERPL-SCNC: 4 MMOL/L (ref 3.6–5.5)
PROT SERPL-MCNC: 6.9 G/DL (ref 6–8.2)
RBC # BLD AUTO: 4.46 M/UL (ref 4.2–5.4)
SODIUM SERPL-SCNC: 136 MMOL/L (ref 135–145)
WBC # BLD AUTO: 11.3 K/UL (ref 4.8–10.8)

## 2021-11-06 PROCEDURE — 700102 HCHG RX REV CODE 250 W/ 637 OVERRIDE(OP): Performed by: STUDENT IN AN ORGANIZED HEALTH CARE EDUCATION/TRAINING PROGRAM

## 2021-11-06 PROCEDURE — A9270 NON-COVERED ITEM OR SERVICE: HCPCS | Performed by: STUDENT IN AN ORGANIZED HEALTH CARE EDUCATION/TRAINING PROGRAM

## 2021-11-06 PROCEDURE — A9270 NON-COVERED ITEM OR SERVICE: HCPCS | Performed by: INTERNAL MEDICINE

## 2021-11-06 PROCEDURE — 770001 HCHG ROOM/CARE - MED/SURG/GYN PRIV*

## 2021-11-06 PROCEDURE — 85025 COMPLETE CBC W/AUTO DIFF WBC: CPT

## 2021-11-06 PROCEDURE — 36415 COLL VENOUS BLD VENIPUNCTURE: CPT

## 2021-11-06 PROCEDURE — 700102 HCHG RX REV CODE 250 W/ 637 OVERRIDE(OP): Performed by: HOSPITALIST

## 2021-11-06 PROCEDURE — 84100 ASSAY OF PHOSPHORUS: CPT

## 2021-11-06 PROCEDURE — 700102 HCHG RX REV CODE 250 W/ 637 OVERRIDE(OP): Performed by: INTERNAL MEDICINE

## 2021-11-06 PROCEDURE — 83735 ASSAY OF MAGNESIUM: CPT

## 2021-11-06 PROCEDURE — 700111 HCHG RX REV CODE 636 W/ 250 OVERRIDE (IP): Performed by: STUDENT IN AN ORGANIZED HEALTH CARE EDUCATION/TRAINING PROGRAM

## 2021-11-06 PROCEDURE — A9270 NON-COVERED ITEM OR SERVICE: HCPCS | Performed by: HOSPITALIST

## 2021-11-06 PROCEDURE — 80053 COMPREHEN METABOLIC PANEL: CPT

## 2021-11-06 PROCEDURE — 99233 SBSQ HOSP IP/OBS HIGH 50: CPT | Performed by: STUDENT IN AN ORGANIZED HEALTH CARE EDUCATION/TRAINING PROGRAM

## 2021-11-06 PROCEDURE — 82533 TOTAL CORTISOL: CPT

## 2021-11-06 RX ADMIN — MIDODRINE HYDROCHLORIDE 10 MG: 5 TABLET ORAL at 08:40

## 2021-11-06 RX ADMIN — DOCUSATE SODIUM 50 MG AND SENNOSIDES 8.6 MG 2 TABLET: 8.6; 5 TABLET, FILM COATED ORAL at 17:52

## 2021-11-06 RX ADMIN — BARICITINIB 4 MG: 2 TABLET, FILM COATED ORAL at 17:52

## 2021-11-06 RX ADMIN — ENOXAPARIN SODIUM 40 MG: 40 INJECTION SUBCUTANEOUS at 05:48

## 2021-11-06 RX ADMIN — Medication 1000 UNITS: at 08:39

## 2021-11-06 RX ADMIN — DOCUSATE SODIUM 50 MG AND SENNOSIDES 8.6 MG 2 TABLET: 8.6; 5 TABLET, FILM COATED ORAL at 05:48

## 2021-11-06 RX ADMIN — Medication 1000 UNITS: at 17:52

## 2021-11-06 RX ADMIN — DEXAMETHASONE 6 MG: 4 TABLET ORAL at 05:48

## 2021-11-06 RX ADMIN — FAMOTIDINE 20 MG: 20 TABLET ORAL at 17:52

## 2021-11-06 RX ADMIN — MIDODRINE HYDROCHLORIDE 10 MG: 5 TABLET ORAL at 11:54

## 2021-11-06 RX ADMIN — MIDODRINE HYDROCHLORIDE 10 MG: 5 TABLET ORAL at 17:52

## 2021-11-06 RX ADMIN — FAMOTIDINE 20 MG: 20 TABLET ORAL at 05:48

## 2021-11-06 ASSESSMENT — ENCOUNTER SYMPTOMS
PALPITATIONS: 0
ABDOMINAL PAIN: 0
SHORTNESS OF BREATH: 1
FEVER: 0
FOCAL WEAKNESS: 0
WEAKNESS: 0
DOUBLE VISION: 0
CARDIOVASCULAR NEGATIVE: 1
EYES NEGATIVE: 1
HEMOPTYSIS: 0
SPUTUM PRODUCTION: 0
WHEEZING: 0
MUSCULOSKELETAL NEGATIVE: 1
FALLS: 0
BRUISES/BLEEDS EASILY: 0
MYALGIAS: 0
HEADACHES: 0
NEUROLOGICAL NEGATIVE: 1
NERVOUS/ANXIOUS: 0
DIZZINESS: 0
NAUSEA: 1
COUGH: 1
BLURRED VISION: 0
VOMITING: 0
SORE THROAT: 0
HEARTBURN: 1

## 2021-11-06 ASSESSMENT — FIBROSIS 4 INDEX: FIB4 SCORE: 2.17

## 2021-11-06 ASSESSMENT — PAIN DESCRIPTION - PAIN TYPE: TYPE: ACUTE PAIN

## 2021-11-06 ASSESSMENT — LIFESTYLE VARIABLES: SUBSTANCE_ABUSE: 0

## 2021-11-06 NOTE — PROGRESS NOTES
"Hospital Medicine Daily Progress Note    Date of Service  11/6/2021    Chief Complaint  Vicenta Saeed is a 66 y.o. female admitted 10/29/2021 with SOB    Hospital Course  Patient is a 66 year old female with no significant past medical history who presnted to the ER on 10/30/1 with dizziness, cough and fever. She has not been vaccinated and just returned from a long visit to [facility].  She developed flu like symptoms on 10/25/21 and outpatient covid testing was negative, however a test here was positive.    Her cxr was consistent with covid pneumonia.  Procalcitonin was low but d dimer elevated, she was found to have acute hypoxic respiratory failure with an oxygen saturation of 85% on RA.    Interval Problem Update  11/2: increased from 3L to 15L. Consulted ID -- remdesivir started. Wean off oxygen. Restrict fluid. Midodrine for hypotension    11/3: switch to HFNC overnight -- currently on 60%/30L. GOC discussed - full code. Notified ID -- d/c remdesivir, started on Baricitinib. Still soft BP 90-100s -- continue midodrine for now, continue fluid restriction. Patient c/o poor appetite, encouraged PO intake as tolerated.    R/o PE -- CTA PE. Negative LE DVT b/l by doppler 10/31/21.    11/4: CTA PE negative. Patient now off HFNC -- from 30L to 10L at time of evaluation. GOC discussed - discharge when oxygen requirement <6L. C/o \"upset stomach\" - pepcid added.    11/5: requiring 10-15L today, but no HFNC. Midodrine dose increased to 10mg TID due to hypotension. Ativan for anxiety.    11/6: no acute overnight events. Patient is still on 15L oxy mask. On decadron and baricitinib. I spoke to daughter at the bedside, and updated patient's current treatment plans. Daughter is asking ivemectin, I told her this is not FDA approved.     Consultants/Specialty  ID    Code Status  Full Code    Disposition  Patient is not medically cleared.   Anticipate discharge to to home with close outpatient follow-up.      Review of " Systems  Review of Systems   Constitutional: Positive for malaise/fatigue. Negative for fever.        Poor appetite   HENT: Negative.  Negative for nosebleeds and sore throat.    Eyes: Negative.  Negative for blurred vision and double vision.   Respiratory: Positive for cough and shortness of breath. Negative for hemoptysis, sputum production and wheezing.    Cardiovascular: Negative.  Negative for chest pain, palpitations and leg swelling.   Gastrointestinal: Positive for heartburn and nausea. Negative for abdominal pain and vomiting.   Genitourinary: Negative.  Negative for dysuria.   Musculoskeletal: Negative.  Negative for falls and myalgias.   Skin: Negative.  Negative for itching and rash.   Neurological: Negative.  Negative for dizziness, focal weakness, weakness and headaches.   Endo/Heme/Allergies: Negative.  Does not bruise/bleed easily.   Psychiatric/Behavioral: Negative for substance abuse. The patient is not nervous/anxious.    All other systems reviewed and are negative.       Physical Exam  Temp:  [36.1 °C (97 °F)-36.6 °C (97.8 °F)] 36.2 °C (97.2 °F)  Pulse:  [] 102  Resp:  [16-20] 17  BP: ()/(55-63) 89/59  SpO2:  [88 %-93 %] 88 %    Physical Exam  Vitals and nursing note reviewed. Exam conducted with a chaperone present.   Constitutional:       Appearance: She is ill-appearing.   HENT:      Head: Normocephalic.      Nose: Nose normal.      Mouth/Throat:      Mouth: Mucous membranes are moist.      Pharynx: Oropharynx is clear.   Eyes:      General: No scleral icterus.     Pupils: Pupils are equal, round, and reactive to light.   Cardiovascular:      Rate and Rhythm: Normal rate and regular rhythm.      Pulses: Normal pulses.      Heart sounds: Normal heart sounds.   Pulmonary:      Breath sounds: No stridor. No rhonchi.      Comments: On oxygen supplements  Inspiratory rales  Mild appreciable crackles  Decreased bibasilar breath sounds  Abdominal:      General: There is no distension.       Palpations: Abdomen is soft.      Tenderness: There is no abdominal tenderness. There is no guarding or rebound.   Musculoskeletal:         General: No swelling or deformity. Normal range of motion.      Cervical back: Neck supple. No tenderness.   Skin:     General: Skin is warm and dry.      Capillary Refill: Capillary refill takes less than 2 seconds.   Neurological:      General: No focal deficit present.      Mental Status: She is alert and oriented to person, place, and time.      Cranial Nerves: No cranial nerve deficit.   Psychiatric:         Mood and Affect: Mood normal.         Behavior: Behavior normal.         Fluids    Intake/Output Summary (Last 24 hours) at 11/6/2021 1557  Last data filed at 11/6/2021 1000  Gross per 24 hour   Intake 240 ml   Output --   Net 240 ml       Laboratory  Recent Labs     11/04/21  0516 11/05/21  0431 11/06/21  0225   WBC 11.1* 9.5 11.3*   RBC 4.28 4.57 4.46   HEMOGLOBIN 13.1 13.4 13.5   HEMATOCRIT 38.5 41.0 40.2   MCV 90.0 89.7 90.1   MCH 30.6 29.3 30.3   MCHC 34.0 32.7* 33.6   RDW 42.5 42.3 42.5   PLATELETCT 252 302 260   MPV 10.1 10.5 10.1     Recent Labs     11/04/21  0516 11/05/21  0431 11/06/21  0224   SODIUM 135 137 136   POTASSIUM 3.7 3.9 4.0   CHLORIDE 102 102 102   CO2 23 20 22   GLUCOSE 98 105* 95   BUN 17 16 17   CREATININE 0.66 0.59 0.70   CALCIUM 8.7 9.0 8.6                   Imaging  CT-CTA CHEST PULMONARY ARTERY W/ RECONS   Final Result         1. No CT evidence of pulmonary embolism.      2. Extensive groundglass and airspace opacity throughout both lungs, in keeping with Covid 19 pneumonia.         DX-CHEST-LIMITED (1 VIEW)   Final Result         1.  There is some decrease in opacification laterally in the left lung compared to the prior radiograph.      2.  Some persistent opacifications are noted which are most prominent in the right upper lobe consistent with pneumonia.      3.  No new infiltrates or consolidations are identified.      US-EXTREMITY  "VENOUS LOWER BILAT   Final Result      DX-CHEST-PORTABLE (1 VIEW)   Final Result         1.  Patchy bilateral pulmonary infiltrates   2.  Atherosclerosis           Assessment/Plan  * Acute hypoxemic respiratory failure due to COVID-19 (HCC)  Assessment & Plan  +Ag 10/29, +PCR 11/2  Procal WNL  CRP 1.93  US doppler negative DVT b/l 10/31  CTA PE negative PE    Decadron 6mg x10 doses  S/p remdesivir x1 dose 11/2  Baricitinib started 11/3  Lasix when BP improved -- unable to due to hypotension  Supportive managements: early mobilization, self prone, RT protocol, judicious fluid, IS  Wean off O2 as tolerated    GERD (gastroesophageal reflux disease)  Assessment & Plan  pepcid    ACP (advance care planning)  Assessment & Plan  Plan of care discussed with patient -- optimize COVID treatment, wean off HFNC support, patient stated \"I will come out of this\" and requests to keep as full code status  FULL code status confirmed    Transaminitis  Assessment & Plan  Mild, likely due to COVID    Hypocalcemia  Assessment & Plan  resolved    Thrombocytopenia (HCC)  Assessment & Plan  resolved    Hypotension due to hypovolemia- (present on admission)  Assessment & Plan  Avoid excess fluid due to fluid  midodrine       VTE prophylaxis: enoxaparin ppx    I have performed a physical exam and reviewed and updated ROS and Plan today (11/6/2021). In review of yesterday's note (11/5/2021), there are no changes except as documented above.  "

## 2021-11-06 NOTE — CARE PLAN
The patient is Watcher - Medium risk of patient condition declining or worsening    Shift Goals  Clinical Goals: prone.maintain O2  Patient Goals: rest,sleep  Family Goals: NA    Progress made toward(s) clinical / shift goals:  monitoring O2  Problem: Knowledge Deficit - Standard  Goal: Patient and family/care givers will demonstrate understanding of plan of care, disease process/condition, diagnostic tests and medications  Outcome: Progressing     Problem: Respiratory  Goal: Patient will achieve/maintain optimum respiratory ventilation and gas exchange  Outcome: Progressing       Patient is not progressing towards the following goals:

## 2021-11-07 LAB
ALBUMIN SERPL BCP-MCNC: 3.4 G/DL (ref 3.2–4.9)
ALBUMIN/GLOB SERPL: 0.9 G/DL
ALP SERPL-CCNC: 84 U/L (ref 30–99)
ALT SERPL-CCNC: 46 U/L (ref 2–50)
ANION GAP SERPL CALC-SCNC: 14 MMOL/L (ref 7–16)
AST SERPL-CCNC: 43 U/L (ref 12–45)
BASOPHILS # BLD AUTO: 0.1 % (ref 0–1.8)
BASOPHILS # BLD: 0.01 K/UL (ref 0–0.12)
BILIRUB SERPL-MCNC: 0.6 MG/DL (ref 0.1–1.5)
BUN SERPL-MCNC: 18 MG/DL (ref 8–22)
CALCIUM SERPL-MCNC: 8.9 MG/DL (ref 8.5–10.5)
CHLORIDE SERPL-SCNC: 101 MMOL/L (ref 96–112)
CO2 SERPL-SCNC: 21 MMOL/L (ref 20–33)
CREAT SERPL-MCNC: 0.71 MG/DL (ref 0.5–1.4)
CRP SERPL HS-MCNC: 7.7 MG/DL (ref 0–0.75)
EOSINOPHIL # BLD AUTO: 0.21 K/UL (ref 0–0.51)
EOSINOPHIL NFR BLD: 1.9 % (ref 0–6.9)
ERYTHROCYTE [DISTWIDTH] IN BLOOD BY AUTOMATED COUNT: 41.4 FL (ref 35.9–50)
GLOBULIN SER CALC-MCNC: 3.6 G/DL (ref 1.9–3.5)
GLUCOSE SERPL-MCNC: 95 MG/DL (ref 65–99)
HCT VFR BLD AUTO: 39.9 % (ref 37–47)
HGB BLD-MCNC: 13.5 G/DL (ref 12–16)
IMM GRANULOCYTES # BLD AUTO: 0.18 K/UL (ref 0–0.11)
IMM GRANULOCYTES NFR BLD AUTO: 1.7 % (ref 0–0.9)
LYMPHOCYTES # BLD AUTO: 1.79 K/UL (ref 1–4.8)
LYMPHOCYTES NFR BLD: 16.5 % (ref 22–41)
MCH RBC QN AUTO: 29.8 PG (ref 27–33)
MCHC RBC AUTO-ENTMCNC: 33.8 G/DL (ref 33.6–35)
MCV RBC AUTO: 88.1 FL (ref 81.4–97.8)
MONOCYTES # BLD AUTO: 0.19 K/UL (ref 0–0.85)
MONOCYTES NFR BLD AUTO: 1.8 % (ref 0–13.4)
NEUTROPHILS # BLD AUTO: 8.47 K/UL (ref 2–7.15)
NEUTROPHILS NFR BLD: 78 % (ref 44–72)
NRBC # BLD AUTO: 0 K/UL
NRBC BLD-RTO: 0 /100 WBC
PLATELET # BLD AUTO: 256 K/UL (ref 164–446)
PMV BLD AUTO: 10.1 FL (ref 9–12.9)
POTASSIUM SERPL-SCNC: 3.7 MMOL/L (ref 3.6–5.5)
PROT SERPL-MCNC: 7 G/DL (ref 6–8.2)
RBC # BLD AUTO: 4.53 M/UL (ref 4.2–5.4)
SODIUM SERPL-SCNC: 136 MMOL/L (ref 135–145)
WBC # BLD AUTO: 10.9 K/UL (ref 4.8–10.8)

## 2021-11-07 PROCEDURE — 80053 COMPREHEN METABOLIC PANEL: CPT

## 2021-11-07 PROCEDURE — A9270 NON-COVERED ITEM OR SERVICE: HCPCS | Performed by: STUDENT IN AN ORGANIZED HEALTH CARE EDUCATION/TRAINING PROGRAM

## 2021-11-07 PROCEDURE — 36415 COLL VENOUS BLD VENIPUNCTURE: CPT

## 2021-11-07 PROCEDURE — 99233 SBSQ HOSP IP/OBS HIGH 50: CPT | Performed by: STUDENT IN AN ORGANIZED HEALTH CARE EDUCATION/TRAINING PROGRAM

## 2021-11-07 PROCEDURE — 770001 HCHG ROOM/CARE - MED/SURG/GYN PRIV*

## 2021-11-07 PROCEDURE — A9270 NON-COVERED ITEM OR SERVICE: HCPCS | Performed by: INTERNAL MEDICINE

## 2021-11-07 PROCEDURE — 700102 HCHG RX REV CODE 250 W/ 637 OVERRIDE(OP): Performed by: HOSPITALIST

## 2021-11-07 PROCEDURE — 700102 HCHG RX REV CODE 250 W/ 637 OVERRIDE(OP): Performed by: STUDENT IN AN ORGANIZED HEALTH CARE EDUCATION/TRAINING PROGRAM

## 2021-11-07 PROCEDURE — 86140 C-REACTIVE PROTEIN: CPT

## 2021-11-07 PROCEDURE — A9270 NON-COVERED ITEM OR SERVICE: HCPCS | Performed by: HOSPITALIST

## 2021-11-07 PROCEDURE — 85025 COMPLETE CBC W/AUTO DIFF WBC: CPT

## 2021-11-07 PROCEDURE — 700111 HCHG RX REV CODE 636 W/ 250 OVERRIDE (IP): Performed by: STUDENT IN AN ORGANIZED HEALTH CARE EDUCATION/TRAINING PROGRAM

## 2021-11-07 PROCEDURE — 700102 HCHG RX REV CODE 250 W/ 637 OVERRIDE(OP): Performed by: INTERNAL MEDICINE

## 2021-11-07 RX ORDER — FUROSEMIDE 40 MG/1
40 TABLET ORAL
Status: DISCONTINUED | OUTPATIENT
Start: 2021-11-07 | End: 2021-11-11

## 2021-11-07 RX ADMIN — DEXAMETHASONE 6 MG: 4 TABLET ORAL at 04:32

## 2021-11-07 RX ADMIN — FAMOTIDINE 20 MG: 20 TABLET ORAL at 17:30

## 2021-11-07 RX ADMIN — MIDODRINE HYDROCHLORIDE 10 MG: 5 TABLET ORAL at 17:30

## 2021-11-07 RX ADMIN — DOCUSATE SODIUM 50 MG AND SENNOSIDES 8.6 MG 2 TABLET: 8.6; 5 TABLET, FILM COATED ORAL at 04:31

## 2021-11-07 RX ADMIN — Medication 1000 UNITS: at 17:30

## 2021-11-07 RX ADMIN — FAMOTIDINE 20 MG: 20 TABLET ORAL at 04:32

## 2021-11-07 RX ADMIN — MIDODRINE HYDROCHLORIDE 10 MG: 5 TABLET ORAL at 13:29

## 2021-11-07 RX ADMIN — MIDODRINE HYDROCHLORIDE 10 MG: 5 TABLET ORAL at 08:36

## 2021-11-07 RX ADMIN — Medication 1000 UNITS: at 08:36

## 2021-11-07 RX ADMIN — ENOXAPARIN SODIUM 40 MG: 40 INJECTION SUBCUTANEOUS at 04:32

## 2021-11-07 RX ADMIN — BARICITINIB 4 MG: 2 TABLET, FILM COATED ORAL at 17:30

## 2021-11-07 RX ADMIN — FUROSEMIDE 40 MG: 40 TABLET ORAL at 08:37

## 2021-11-07 ASSESSMENT — ENCOUNTER SYMPTOMS
WEAKNESS: 0
MUSCULOSKELETAL NEGATIVE: 1
HEMOPTYSIS: 0
FOCAL WEAKNESS: 0
BLURRED VISION: 0
VOMITING: 0
CARDIOVASCULAR NEGATIVE: 1
PALPITATIONS: 0
NERVOUS/ANXIOUS: 0
FALLS: 0
MYALGIAS: 0
ABDOMINAL PAIN: 0
COUGH: 1
NAUSEA: 1
SHORTNESS OF BREATH: 1
HEADACHES: 0
SPUTUM PRODUCTION: 0
EYES NEGATIVE: 1
SORE THROAT: 0
DOUBLE VISION: 0
HEARTBURN: 1
DIZZINESS: 0
FEVER: 0
WHEEZING: 0
NEUROLOGICAL NEGATIVE: 1
BRUISES/BLEEDS EASILY: 0

## 2021-11-07 ASSESSMENT — LIFESTYLE VARIABLES: SUBSTANCE_ABUSE: 0

## 2021-11-07 ASSESSMENT — PAIN DESCRIPTION - PAIN TYPE: TYPE: ACUTE PAIN

## 2021-11-07 NOTE — CARE PLAN
The patient is Watcher - Medium risk of patient condition declining or worsening    Shift Goals  Clinical Goals: maintain adequate oxygenation   Patient Goals: rest and breathe comfortably   Family Goals: improved oxygen saturation, activity tolerance    Progress made toward(s) clinical / shift goals:  all      Patient is not progressing towards the following goals:

## 2021-11-07 NOTE — CARE PLAN
The patient is Watcher - Medium risk of patient condition declining or worsening    Shift Goals  Clinical Goals: increased activity tolerance, decreased oxygen requirements   Patient Goals: Rest, improved oxyegn  Family Goals: improved oxygen saturation, activity tolerance    Progress made toward(s) clinical / shift goals:    Problem: Knowledge Deficit - Standard  Goal: Patient and family/care givers will demonstrate understanding of plan of care, disease process/condition, diagnostic tests and medications  Outcome: Progressing       Patient is not progressing towards the following goals:      Problem: Respiratory  Goal: Patient will achieve/maintain optimum respiratory ventilation and gas exchange  Outcome: Not Progressing       Problem: Respiratory  Goal: Patient will achieve/maintain optimum respiratory ventilation and gas exchange  Outcome: Not Progressing  Still with increased oxygen requirements

## 2021-11-07 NOTE — PROGRESS NOTES
Covid 19 surge in effect    0700 Assumed care of pt. AAOx4. Oxygen at 15L oximask with saturations in upper 80's. Up independently/SBA. Encouraged to prone. Will continue to monitor and provide support as needed.     1115 Pt desaturated into mid 60s when getting up to BSC on 15 L NRB. Pt proned, sats returned to 88%. No acute distress noted at this time

## 2021-11-07 NOTE — PROGRESS NOTES
"Hospital Medicine Daily Progress Note    Date of Service  11/7/2021    Chief Complaint  Vicenta Saeed is a 66 y.o. female admitted 10/29/2021 with SOB    Hospital Course  Patient is a 66 year old female with no significant past medical history who presnted to the ER on 10/30/1 with dizziness, cough and fever. She has not been vaccinated and just returned from a long visit to [facility].  She developed flu like symptoms on 10/25/21 and outpatient covid testing was negative, however a test here was positive.    Her cxr was consistent with covid pneumonia.  Procalcitonin was low but d dimer elevated, she was found to have acute hypoxic respiratory failure with an oxygen saturation of 85% on RA.    Interval Problem Update  11/2: increased from 3L to 15L. Consulted ID -- remdesivir started. Wean off oxygen. Restrict fluid. Midodrine for hypotension    11/3: switch to HFNC overnight -- currently on 60%/30L. GOC discussed - full code. Notified ID -- d/c remdesivir, started on Baricitinib. Still soft BP 90-100s -- continue midodrine for now, continue fluid restriction. Patient c/o poor appetite, encouraged PO intake as tolerated.    R/o PE -- CTA PE. Negative LE DVT b/l by doppler 10/31/21.    11/4: CTA PE negative. Patient now off HFNC -- from 30L to 10L at time of evaluation. GOC discussed - discharge when oxygen requirement <6L. C/o \"upset stomach\" - pepcid added.    11/5: requiring 10-15L today, but no HFNC. Midodrine dose increased to 10mg TID due to hypotension. Ativan for anxiety.    11/6: no acute overnight events. Patient is still on 15L oxy mask. On decadron and baricitinib. I spoke to daughter at the bedside, and updated patient's current treatment plans. Daughter is asking ivemectin, I told her this is not FDA approved.     11/7: no acute overnight events. On 15L NRB. Had large BM this morning. Easily desating with movements. CRP 7.7. Encourage to use IS, OOB      Consultants/Specialty  ID    Code " Status  Full Code    Disposition  Patient is not medically cleared.   Anticipate discharge to to home with close outpatient follow-up.      Review of Systems  Review of Systems   Constitutional: Positive for malaise/fatigue. Negative for fever.        Poor appetite   HENT: Negative.  Negative for nosebleeds and sore throat.    Eyes: Negative.  Negative for blurred vision and double vision.   Respiratory: Positive for cough and shortness of breath. Negative for hemoptysis, sputum production and wheezing.    Cardiovascular: Negative.  Negative for chest pain, palpitations and leg swelling.   Gastrointestinal: Positive for heartburn and nausea. Negative for abdominal pain and vomiting.   Genitourinary: Negative.  Negative for dysuria.   Musculoskeletal: Negative.  Negative for falls and myalgias.   Skin: Negative.  Negative for itching and rash.   Neurological: Negative.  Negative for dizziness, focal weakness, weakness and headaches.   Endo/Heme/Allergies: Negative.  Does not bruise/bleed easily.   Psychiatric/Behavioral: Negative for substance abuse. The patient is not nervous/anxious.    All other systems reviewed and are negative.       Physical Exam  Temp:  [35.9 °C (96.7 °F)-37.8 °C (100.1 °F)] 37.8 °C (100.1 °F)  Pulse:  [61-99] 99  Resp:  [17-18] 18  BP: ()/(51-67) 104/67  SpO2:  [89 %-93 %] 89 %    Physical Exam  Vitals and nursing note reviewed. Exam conducted with a chaperone present.   Constitutional:       Appearance: She is ill-appearing.   HENT:      Head: Normocephalic.      Nose: Nose normal.      Mouth/Throat:      Mouth: Mucous membranes are moist.      Pharynx: Oropharynx is clear.   Eyes:      General: No scleral icterus.     Pupils: Pupils are equal, round, and reactive to light.   Cardiovascular:      Rate and Rhythm: Normal rate and regular rhythm.      Pulses: Normal pulses.      Heart sounds: Normal heart sounds.   Pulmonary:      Breath sounds: No stridor. No rhonchi.      Comments: On  oxygen supplements  Inspiratory rales  Mild appreciable crackles  Decreased bibasilar breath sounds  Abdominal:      General: There is no distension.      Palpations: Abdomen is soft.      Tenderness: There is no abdominal tenderness. There is no guarding or rebound.   Musculoskeletal:         General: No swelling or deformity. Normal range of motion.      Cervical back: Neck supple. No tenderness.   Skin:     General: Skin is warm and dry.      Capillary Refill: Capillary refill takes less than 2 seconds.   Neurological:      General: No focal deficit present.      Mental Status: She is alert and oriented to person, place, and time.      Cranial Nerves: No cranial nerve deficit.   Psychiatric:         Mood and Affect: Mood normal.         Behavior: Behavior normal.         Fluids    Intake/Output Summary (Last 24 hours) at 11/7/2021 1145  Last data filed at 11/7/2021 0600  Gross per 24 hour   Intake 340 ml   Output --   Net 340 ml       Laboratory  Recent Labs     11/05/21  0431 11/06/21  0225 11/07/21  0307   WBC 9.5 11.3* 10.9*   RBC 4.57 4.46 4.53   HEMOGLOBIN 13.4 13.5 13.5   HEMATOCRIT 41.0 40.2 39.9   MCV 89.7 90.1 88.1   MCH 29.3 30.3 29.8   MCHC 32.7* 33.6 33.8   RDW 42.3 42.5 41.4   PLATELETCT 302 260 256   MPV 10.5 10.1 10.1     Recent Labs     11/05/21  0431 11/06/21  0224 11/07/21  0307   SODIUM 137 136 136   POTASSIUM 3.9 4.0 3.7   CHLORIDE 102 102 101   CO2 20 22 21   GLUCOSE 105* 95 95   BUN 16 17 18   CREATININE 0.59 0.70 0.71   CALCIUM 9.0 8.6 8.9                   Imaging  CT-CTA CHEST PULMONARY ARTERY W/ RECONS   Final Result         1. No CT evidence of pulmonary embolism.      2. Extensive groundglass and airspace opacity throughout both lungs, in keeping with Covid 19 pneumonia.         DX-CHEST-LIMITED (1 VIEW)   Final Result         1.  There is some decrease in opacification laterally in the left lung compared to the prior radiograph.      2.  Some persistent opacifications are noted which  "are most prominent in the right upper lobe consistent with pneumonia.      3.  No new infiltrates or consolidations are identified.      US-EXTREMITY VENOUS LOWER BILAT   Final Result      DX-CHEST-PORTABLE (1 VIEW)   Final Result         1.  Patchy bilateral pulmonary infiltrates   2.  Atherosclerosis           Assessment/Plan  * Acute hypoxemic respiratory failure due to COVID-19 (HCC)  Assessment & Plan  +Ag 10/29, +PCR 11/2  Procal WNL  CRP 1.93>7.7  US doppler negative DVT b/l 10/31  CTA PE negative PE    Decadron 6mg x10 doses  S/p remdesivir x1 dose 11/2  Baricitinib started 11/3  Lasix when BP improved -- unable to due to hypotension  Supportive managements: early mobilization, self prone, RT protocol, judicious fluid, IS  Wean off O2 as tolerated    GERD (gastroesophageal reflux disease)  Assessment & Plan  pepcid    ACP (advance care planning)  Assessment & Plan  Plan of care discussed with patient -- optimize COVID treatment, wean off HFNC support, patient stated \"I will come out of this\" and requests to keep as full code status  FULL code status confirmed    Transaminitis  Assessment & Plan  Mild, likely due to COVID    Hypocalcemia  Assessment & Plan  resolved    Thrombocytopenia (HCC)  Assessment & Plan  resolved    Hypotension due to hypovolemia- (present on admission)  Assessment & Plan  Avoid excess fluid due to fluid  midodrine       VTE prophylaxis: enoxaparin ppx    I have performed a physical exam and reviewed and updated ROS and Plan today (11/7/2021). In review of yesterday's note (11/6/2021), there are no changes except as documented above.  "

## 2021-11-08 PROBLEM — E87.1 HYPONATREMIA: Status: ACTIVE | Noted: 2021-11-08

## 2021-11-08 LAB
ALBUMIN SERPL BCP-MCNC: 3.7 G/DL (ref 3.2–4.9)
ALBUMIN/GLOB SERPL: 0.9 G/DL
ALP SERPL-CCNC: 90 U/L (ref 30–99)
ALT SERPL-CCNC: 41 U/L (ref 2–50)
ANION GAP SERPL CALC-SCNC: 14 MMOL/L (ref 7–16)
AST SERPL-CCNC: 37 U/L (ref 12–45)
BASOPHILS # BLD AUTO: 0.3 % (ref 0–1.8)
BASOPHILS # BLD: 0.04 K/UL (ref 0–0.12)
BILIRUB SERPL-MCNC: 0.7 MG/DL (ref 0.1–1.5)
BUN SERPL-MCNC: 25 MG/DL (ref 8–22)
CALCIUM SERPL-MCNC: 9.2 MG/DL (ref 8.5–10.5)
CHLORIDE SERPL-SCNC: 95 MMOL/L (ref 96–112)
CO2 SERPL-SCNC: 22 MMOL/L (ref 20–33)
CREAT SERPL-MCNC: 0.95 MG/DL (ref 0.5–1.4)
EOSINOPHIL # BLD AUTO: 0.4 K/UL (ref 0–0.51)
EOSINOPHIL NFR BLD: 3 % (ref 0–6.9)
ERYTHROCYTE [DISTWIDTH] IN BLOOD BY AUTOMATED COUNT: 41.8 FL (ref 35.9–50)
GLOBULIN SER CALC-MCNC: 4.3 G/DL (ref 1.9–3.5)
GLUCOSE SERPL-MCNC: 105 MG/DL (ref 65–99)
HCT VFR BLD AUTO: 41 % (ref 37–47)
HGB BLD-MCNC: 14.1 G/DL (ref 12–16)
IMM GRANULOCYTES # BLD AUTO: 0.37 K/UL (ref 0–0.11)
IMM GRANULOCYTES NFR BLD AUTO: 2.8 % (ref 0–0.9)
LYMPHOCYTES # BLD AUTO: 1.77 K/UL (ref 1–4.8)
LYMPHOCYTES NFR BLD: 13.3 % (ref 22–41)
MCH RBC QN AUTO: 30.4 PG (ref 27–33)
MCHC RBC AUTO-ENTMCNC: 34.4 G/DL (ref 33.6–35)
MCV RBC AUTO: 88.4 FL (ref 81.4–97.8)
MONOCYTES # BLD AUTO: 0.18 K/UL (ref 0–0.85)
MONOCYTES NFR BLD AUTO: 1.4 % (ref 0–13.4)
NEUTROPHILS # BLD AUTO: 10.51 K/UL (ref 2–7.15)
NEUTROPHILS NFR BLD: 79.2 % (ref 44–72)
NRBC # BLD AUTO: 0 K/UL
NRBC BLD-RTO: 0 /100 WBC
PLATELET # BLD AUTO: 302 K/UL (ref 164–446)
PMV BLD AUTO: 10.6 FL (ref 9–12.9)
POTASSIUM SERPL-SCNC: 3.6 MMOL/L (ref 3.6–5.5)
PROT SERPL-MCNC: 8 G/DL (ref 6–8.2)
RBC # BLD AUTO: 4.64 M/UL (ref 4.2–5.4)
SODIUM SERPL-SCNC: 131 MMOL/L (ref 135–145)
WBC # BLD AUTO: 13.3 K/UL (ref 4.8–10.8)

## 2021-11-08 PROCEDURE — 85025 COMPLETE CBC W/AUTO DIFF WBC: CPT

## 2021-11-08 PROCEDURE — A9270 NON-COVERED ITEM OR SERVICE: HCPCS | Performed by: STUDENT IN AN ORGANIZED HEALTH CARE EDUCATION/TRAINING PROGRAM

## 2021-11-08 PROCEDURE — A9270 NON-COVERED ITEM OR SERVICE: HCPCS | Performed by: HOSPITALIST

## 2021-11-08 PROCEDURE — A9270 NON-COVERED ITEM OR SERVICE: HCPCS | Performed by: INTERNAL MEDICINE

## 2021-11-08 PROCEDURE — 700102 HCHG RX REV CODE 250 W/ 637 OVERRIDE(OP): Performed by: HOSPITALIST

## 2021-11-08 PROCEDURE — 770001 HCHG ROOM/CARE - MED/SURG/GYN PRIV*

## 2021-11-08 PROCEDURE — 80053 COMPREHEN METABOLIC PANEL: CPT

## 2021-11-08 PROCEDURE — 700102 HCHG RX REV CODE 250 W/ 637 OVERRIDE(OP): Performed by: INTERNAL MEDICINE

## 2021-11-08 PROCEDURE — 36415 COLL VENOUS BLD VENIPUNCTURE: CPT

## 2021-11-08 PROCEDURE — 700102 HCHG RX REV CODE 250 W/ 637 OVERRIDE(OP): Performed by: STUDENT IN AN ORGANIZED HEALTH CARE EDUCATION/TRAINING PROGRAM

## 2021-11-08 PROCEDURE — 700111 HCHG RX REV CODE 636 W/ 250 OVERRIDE (IP): Performed by: STUDENT IN AN ORGANIZED HEALTH CARE EDUCATION/TRAINING PROGRAM

## 2021-11-08 PROCEDURE — 99233 SBSQ HOSP IP/OBS HIGH 50: CPT | Performed by: STUDENT IN AN ORGANIZED HEALTH CARE EDUCATION/TRAINING PROGRAM

## 2021-11-08 RX ADMIN — FAMOTIDINE 20 MG: 20 TABLET ORAL at 17:53

## 2021-11-08 RX ADMIN — Medication 1000 UNITS: at 17:52

## 2021-11-08 RX ADMIN — DEXAMETHASONE 6 MG: 4 TABLET ORAL at 04:55

## 2021-11-08 RX ADMIN — MIDODRINE HYDROCHLORIDE 10 MG: 5 TABLET ORAL at 10:25

## 2021-11-08 RX ADMIN — Medication 1000 UNITS: at 10:25

## 2021-11-08 RX ADMIN — MIDODRINE HYDROCHLORIDE 10 MG: 5 TABLET ORAL at 13:27

## 2021-11-08 RX ADMIN — ENOXAPARIN SODIUM 40 MG: 40 INJECTION SUBCUTANEOUS at 04:55

## 2021-11-08 RX ADMIN — MIDODRINE HYDROCHLORIDE 10 MG: 5 TABLET ORAL at 17:53

## 2021-11-08 RX ADMIN — BARICITINIB 4 MG: 2 TABLET, FILM COATED ORAL at 17:53

## 2021-11-08 ASSESSMENT — ENCOUNTER SYMPTOMS
HEMOPTYSIS: 0
DOUBLE VISION: 0
FEVER: 0
BRUISES/BLEEDS EASILY: 0
FOCAL WEAKNESS: 0
VOMITING: 0
ABDOMINAL PAIN: 0
COUGH: 1
MUSCULOSKELETAL NEGATIVE: 1
SHORTNESS OF BREATH: 1
CARDIOVASCULAR NEGATIVE: 1
BLURRED VISION: 0
WEAKNESS: 0
NEUROLOGICAL NEGATIVE: 1
SORE THROAT: 0
SPUTUM PRODUCTION: 0
HEARTBURN: 1
EYES NEGATIVE: 1
NERVOUS/ANXIOUS: 0
NAUSEA: 1
WHEEZING: 0
MYALGIAS: 0
HEADACHES: 0
FALLS: 0
PALPITATIONS: 0
DIZZINESS: 0

## 2021-11-08 ASSESSMENT — PAIN DESCRIPTION - PAIN TYPE
TYPE: ACUTE PAIN
TYPE: ACUTE PAIN

## 2021-11-08 ASSESSMENT — LIFESTYLE VARIABLES: SUBSTANCE_ABUSE: 0

## 2021-11-08 NOTE — CARE PLAN
The patient is Stable - Low risk of patient condition declining or worsening    Shift Goals  Clinical Goals: Maintain O2>90%  Patient Goals: rest and breathe comfortably     Progress made toward(s) clinical / shift goals:    Plan of care reviewed with pt, all questions and concerns addressed. Pt denies pain and SOB. Pt reminded to use call light when in need of assistance, pt verbalizes understanding and agreeable.    Problem: Knowledge Deficit - Standard  Goal: Patient and family/care givers will demonstrate understanding of plan of care, disease process/condition, diagnostic tests and medications  Outcome: Progressing     Problem: Respiratory  Goal: Patient will achieve/maintain optimum respiratory ventilation and gas exchange  Outcome: Progressing

## 2021-11-08 NOTE — PROGRESS NOTES
Covid 19 Surge in effect    0700 Assumed care of pt. Bp remains soft. Remains on 15L NRB with saturations in mid 90s. Self proning.

## 2021-11-08 NOTE — PROGRESS NOTES
"Hospital Medicine Daily Progress Note    Date of Service  11/8/2021    Chief Complaint  Vicenta Saeed is a 66 y.o. female admitted 10/29/2021 with SOB    Hospital Course  Patient is a 66 year old female with no significant past medical history who presnted to the ER on 10/30/1 with dizziness, cough and fever. She has not been vaccinated and just returned from a long visit to [facility].  She developed flu like symptoms on 10/25/21 and outpatient covid testing was negative, however a test here was positive.    Her cxr was consistent with covid pneumonia.  Procalcitonin was low but d dimer elevated, she was found to have acute hypoxic respiratory failure with an oxygen saturation of 85% on RA.    Interval Problem Update  11/2: increased from 3L to 15L. Consulted ID -- remdesivir started. Wean off oxygen. Restrict fluid. Midodrine for hypotension    11/3: switch to HFNC overnight -- currently on 60%/30L. GOC discussed - full code. Notified ID -- d/c remdesivir, started on Baricitinib. Still soft BP 90-100s -- continue midodrine for now, continue fluid restriction. Patient c/o poor appetite, encouraged PO intake as tolerated.    R/o PE -- CTA PE. Negative LE DVT b/l by doppler 10/31/21.    11/4: CTA PE negative. Patient now off HFNC -- from 30L to 10L at time of evaluation. GOC discussed - discharge when oxygen requirement <6L. C/o \"upset stomach\" - pepcid added.    11/5: requiring 10-15L today, but no HFNC. Midodrine dose increased to 10mg TID due to hypotension. Ativan for anxiety.    11/6: no acute overnight events. Patient is still on 15L oxy mask. On decadron and baricitinib. I spoke to daughter at the bedside, and updated patient's current treatment plans. Daughter is asking ivemectin, I told her this is not FDA approved.     11/7: no acute overnight events. On 15L NRB. Had large BM this morning. Easily desating with movements. CRP 7.7. Encourage to use IS, OOB    11/8: no acute overnight events. Seen at " bedside proning, on 15L NRB. However still easily desating with any movements. Unable to sit up or walk due to desating.       Consultants/Specialty  ID    Code Status  Full Code    Disposition  Patient is not medically cleared.   Anticipate discharge to to home with close outpatient follow-up.      Review of Systems  Review of Systems   Constitutional: Positive for malaise/fatigue. Negative for fever.        Poor appetite   HENT: Negative.  Negative for nosebleeds and sore throat.    Eyes: Negative.  Negative for blurred vision and double vision.   Respiratory: Positive for cough and shortness of breath. Negative for hemoptysis, sputum production and wheezing.    Cardiovascular: Negative.  Negative for chest pain, palpitations and leg swelling.   Gastrointestinal: Positive for heartburn and nausea. Negative for abdominal pain and vomiting.   Genitourinary: Negative.  Negative for dysuria.   Musculoskeletal: Negative.  Negative for falls and myalgias.   Skin: Negative.  Negative for itching and rash.   Neurological: Negative.  Negative for dizziness, focal weakness, weakness and headaches.   Endo/Heme/Allergies: Negative.  Does not bruise/bleed easily.   Psychiatric/Behavioral: Negative for substance abuse. The patient is not nervous/anxious.    All other systems reviewed and are negative.       Physical Exam  Temp:  [36.6 °C (97.9 °F)-38 °C (100.4 °F)] 38 °C (100.4 °F)  Pulse:  [85-92] 92  Resp:  [18] 18  BP: ()/(58-61) 83/58  SpO2:  [94 %-99 %] 94 %    Physical Exam  Vitals and nursing note reviewed. Exam conducted with a chaperone present.   Constitutional:       Appearance: She is ill-appearing.   HENT:      Head: Normocephalic.      Nose: Nose normal.      Mouth/Throat:      Mouth: Mucous membranes are moist.      Pharynx: Oropharynx is clear.   Eyes:      General: No scleral icterus.     Pupils: Pupils are equal, round, and reactive to light.   Cardiovascular:      Rate and Rhythm: Normal rate and  regular rhythm.      Pulses: Normal pulses.      Heart sounds: Normal heart sounds.   Pulmonary:      Breath sounds: No stridor. No rhonchi.      Comments: On oxygen supplements  Inspiratory rales  Mild appreciable crackles  Decreased bibasilar breath sounds  Abdominal:      General: There is no distension.      Palpations: Abdomen is soft.      Tenderness: There is no abdominal tenderness. There is no guarding or rebound.   Musculoskeletal:         General: No swelling or deformity. Normal range of motion.      Cervical back: Neck supple. No tenderness.   Skin:     General: Skin is warm and dry.      Capillary Refill: Capillary refill takes less than 2 seconds.   Neurological:      General: No focal deficit present.      Mental Status: She is alert and oriented to person, place, and time.      Cranial Nerves: No cranial nerve deficit.   Psychiatric:         Mood and Affect: Mood normal.         Behavior: Behavior normal.         Fluids    Intake/Output Summary (Last 24 hours) at 11/8/2021 1244  Last data filed at 11/7/2021 2100  Gross per 24 hour   Intake 240 ml   Output --   Net 240 ml       Laboratory  Recent Labs     11/06/21  0225 11/07/21  0307 11/08/21  0615   WBC 11.3* 10.9* 13.3*   RBC 4.46 4.53 4.64   HEMOGLOBIN 13.5 13.5 14.1   HEMATOCRIT 40.2 39.9 41.0   MCV 90.1 88.1 88.4   MCH 30.3 29.8 30.4   MCHC 33.6 33.8 34.4   RDW 42.5 41.4 41.8   PLATELETCT 260 256 302   MPV 10.1 10.1 10.6     Recent Labs     11/06/21  0224 11/07/21  0307 11/08/21  0615   SODIUM 136 136 131*   POTASSIUM 4.0 3.7 3.6   CHLORIDE 102 101 95*   CO2 22 21 22   GLUCOSE 95 95 105*   BUN 17 18 25*   CREATININE 0.70 0.71 0.95   CALCIUM 8.6 8.9 9.2                   Imaging  CT-CTA CHEST PULMONARY ARTERY W/ RECONS   Final Result         1. No CT evidence of pulmonary embolism.      2. Extensive groundglass and airspace opacity throughout both lungs, in keeping with Covid 19 pneumonia.         DX-CHEST-LIMITED (1 VIEW)   Final Result        "  1.  There is some decrease in opacification laterally in the left lung compared to the prior radiograph.      2.  Some persistent opacifications are noted which are most prominent in the right upper lobe consistent with pneumonia.      3.  No new infiltrates or consolidations are identified.      US-EXTREMITY VENOUS LOWER BILAT   Final Result      DX-CHEST-PORTABLE (1 VIEW)   Final Result         1.  Patchy bilateral pulmonary infiltrates   2.  Atherosclerosis           Assessment/Plan  * Acute hypoxemic respiratory failure due to COVID-19 (HCC)  Assessment & Plan  +Ag 10/29, +PCR 11/2  Procal WNL  CRP 1.93>7.7  US doppler negative DVT b/l 10/31  CTA PE negative PE    Decadron 6mg x10 doses (completed)  S/p remdesivir x1 dose 11/2  Baricitinib started 11/3  Lasix when BP improved -- unable to due to hypotension  Supportive managements: early mobilization, self prone, RT protocol, judicious fluid, IS  Wean off O2 as tolerated    Hyponatremia  Assessment & Plan  Sec to poor oral intake and covid, mild  Encourage oral intake  Boost  Cont monitoring    GERD (gastroesophageal reflux disease)  Assessment & Plan  pepcid    ACP (advance care planning)  Assessment & Plan  Plan of care discussed with patient -- optimize COVID treatment, wean off HFNC support, patient stated \"I will come out of this\" and requests to keep as full code status  FULL code status confirmed    Transaminitis  Assessment & Plan  Mild, likely due to COVID    Hypocalcemia  Assessment & Plan  resolved    Thrombocytopenia (HCC)  Assessment & Plan  resolved    Hypotension due to hypovolemia- (present on admission)  Assessment & Plan  Avoid excess fluid due to fluid  midodrine       VTE prophylaxis: enoxaparin ppx    I have performed a physical exam and reviewed and updated ROS and Plan today (11/8/2021). In review of yesterday's note (11/7/2021), there are no changes except as documented above.  "

## 2021-11-09 LAB
ANION GAP SERPL CALC-SCNC: 13 MMOL/L (ref 7–16)
BASOPHILS # BLD AUTO: 0.2 % (ref 0–1.8)
BASOPHILS # BLD: 0.03 K/UL (ref 0–0.12)
BUN SERPL-MCNC: 27 MG/DL (ref 8–22)
CALCIUM SERPL-MCNC: 9.1 MG/DL (ref 8.5–10.5)
CHLORIDE SERPL-SCNC: 96 MMOL/L (ref 96–112)
CO2 SERPL-SCNC: 23 MMOL/L (ref 20–33)
CREAT SERPL-MCNC: 0.78 MG/DL (ref 0.5–1.4)
CRP SERPL HS-MCNC: 6.99 MG/DL (ref 0–0.75)
EOSINOPHIL # BLD AUTO: 0.1 K/UL (ref 0–0.51)
EOSINOPHIL NFR BLD: 0.7 % (ref 0–6.9)
ERYTHROCYTE [DISTWIDTH] IN BLOOD BY AUTOMATED COUNT: 40.5 FL (ref 35.9–50)
GLUCOSE SERPL-MCNC: 105 MG/DL (ref 65–99)
HCT VFR BLD AUTO: 38.8 % (ref 37–47)
HGB BLD-MCNC: 13.4 G/DL (ref 12–16)
IMM GRANULOCYTES # BLD AUTO: 0.62 K/UL (ref 0–0.11)
IMM GRANULOCYTES NFR BLD AUTO: 4.5 % (ref 0–0.9)
LYMPHOCYTES # BLD AUTO: 2.22 K/UL (ref 1–4.8)
LYMPHOCYTES NFR BLD: 16 % (ref 22–41)
MCH RBC QN AUTO: 30.2 PG (ref 27–33)
MCHC RBC AUTO-ENTMCNC: 34.5 G/DL (ref 33.6–35)
MCV RBC AUTO: 87.4 FL (ref 81.4–97.8)
MONOCYTES # BLD AUTO: 0.29 K/UL (ref 0–0.85)
MONOCYTES NFR BLD AUTO: 2.1 % (ref 0–13.4)
NEUTROPHILS # BLD AUTO: 10.61 K/UL (ref 2–7.15)
NEUTROPHILS NFR BLD: 76.5 % (ref 44–72)
NRBC # BLD AUTO: 0 K/UL
NRBC BLD-RTO: 0 /100 WBC
NT-PROBNP SERPL IA-MCNC: 164 PG/ML (ref 0–125)
PLATELET # BLD AUTO: 322 K/UL (ref 164–446)
PMV BLD AUTO: 10.2 FL (ref 9–12.9)
POTASSIUM SERPL-SCNC: 3.9 MMOL/L (ref 3.6–5.5)
PROCALCITONIN SERPL-MCNC: 0.07 NG/ML
RBC # BLD AUTO: 4.44 M/UL (ref 4.2–5.4)
SODIUM SERPL-SCNC: 132 MMOL/L (ref 135–145)
WBC # BLD AUTO: 13.9 K/UL (ref 4.8–10.8)

## 2021-11-09 PROCEDURE — 83880 ASSAY OF NATRIURETIC PEPTIDE: CPT

## 2021-11-09 PROCEDURE — 700102 HCHG RX REV CODE 250 W/ 637 OVERRIDE(OP): Performed by: STUDENT IN AN ORGANIZED HEALTH CARE EDUCATION/TRAINING PROGRAM

## 2021-11-09 PROCEDURE — 80048 BASIC METABOLIC PNL TOTAL CA: CPT

## 2021-11-09 PROCEDURE — 770001 HCHG ROOM/CARE - MED/SURG/GYN PRIV*

## 2021-11-09 PROCEDURE — A9270 NON-COVERED ITEM OR SERVICE: HCPCS | Performed by: INTERNAL MEDICINE

## 2021-11-09 PROCEDURE — 36415 COLL VENOUS BLD VENIPUNCTURE: CPT

## 2021-11-09 PROCEDURE — 84145 PROCALCITONIN (PCT): CPT

## 2021-11-09 PROCEDURE — A9270 NON-COVERED ITEM OR SERVICE: HCPCS | Performed by: STUDENT IN AN ORGANIZED HEALTH CARE EDUCATION/TRAINING PROGRAM

## 2021-11-09 PROCEDURE — 700102 HCHG RX REV CODE 250 W/ 637 OVERRIDE(OP): Performed by: INTERNAL MEDICINE

## 2021-11-09 PROCEDURE — 99233 SBSQ HOSP IP/OBS HIGH 50: CPT | Performed by: STUDENT IN AN ORGANIZED HEALTH CARE EDUCATION/TRAINING PROGRAM

## 2021-11-09 PROCEDURE — 700102 HCHG RX REV CODE 250 W/ 637 OVERRIDE(OP): Performed by: HOSPITALIST

## 2021-11-09 PROCEDURE — A9270 NON-COVERED ITEM OR SERVICE: HCPCS | Performed by: HOSPITALIST

## 2021-11-09 PROCEDURE — 86140 C-REACTIVE PROTEIN: CPT

## 2021-11-09 PROCEDURE — 700111 HCHG RX REV CODE 636 W/ 250 OVERRIDE (IP): Performed by: STUDENT IN AN ORGANIZED HEALTH CARE EDUCATION/TRAINING PROGRAM

## 2021-11-09 PROCEDURE — 85025 COMPLETE CBC W/AUTO DIFF WBC: CPT

## 2021-11-09 RX ADMIN — DOCUSATE SODIUM 50 MG AND SENNOSIDES 8.6 MG 2 TABLET: 8.6; 5 TABLET, FILM COATED ORAL at 16:18

## 2021-11-09 RX ADMIN — DOCUSATE SODIUM 50 MG AND SENNOSIDES 8.6 MG 2 TABLET: 8.6; 5 TABLET, FILM COATED ORAL at 04:46

## 2021-11-09 RX ADMIN — MIDODRINE HYDROCHLORIDE 10 MG: 5 TABLET ORAL at 04:46

## 2021-11-09 RX ADMIN — FAMOTIDINE 20 MG: 20 TABLET ORAL at 16:18

## 2021-11-09 RX ADMIN — MIDODRINE HYDROCHLORIDE 10 MG: 5 TABLET ORAL at 16:18

## 2021-11-09 RX ADMIN — Medication 1000 UNITS: at 16:18

## 2021-11-09 RX ADMIN — Medication 1000 UNITS: at 10:10

## 2021-11-09 RX ADMIN — ENOXAPARIN SODIUM 40 MG: 40 INJECTION SUBCUTANEOUS at 04:45

## 2021-11-09 RX ADMIN — BARICITINIB 4 MG: 2 TABLET, FILM COATED ORAL at 16:19

## 2021-11-09 RX ADMIN — FAMOTIDINE 20 MG: 20 TABLET ORAL at 04:46

## 2021-11-09 RX ADMIN — MIDODRINE HYDROCHLORIDE 10 MG: 5 TABLET ORAL at 11:58

## 2021-11-09 ASSESSMENT — ENCOUNTER SYMPTOMS
BRUISES/BLEEDS EASILY: 0
WEAKNESS: 0
HEARTBURN: 1
NERVOUS/ANXIOUS: 0
FOCAL WEAKNESS: 0
SHORTNESS OF BREATH: 1
BLURRED VISION: 0
NEUROLOGICAL NEGATIVE: 1
COUGH: 1
CARDIOVASCULAR NEGATIVE: 1
DOUBLE VISION: 0
EYES NEGATIVE: 1
SORE THROAT: 0
MUSCULOSKELETAL NEGATIVE: 1
HEMOPTYSIS: 0
PALPITATIONS: 0
WHEEZING: 0
FEVER: 0
NAUSEA: 1
SPUTUM PRODUCTION: 0
ABDOMINAL PAIN: 0
HEADACHES: 0
FALLS: 0
MYALGIAS: 0
VOMITING: 0
DIZZINESS: 0

## 2021-11-09 ASSESSMENT — LIFESTYLE VARIABLES: SUBSTANCE_ABUSE: 0

## 2021-11-09 ASSESSMENT — PAIN DESCRIPTION - PAIN TYPE: TYPE: ACUTE PAIN

## 2021-11-09 NOTE — CARE PLAN
The patient is Watcher - Medium risk of patient condition declining or worsening    Shift Goals  Clinical Goals: maintain adequate oxygenation  Patient Goals: rest and breathe comfortably  Family Goals: improved oxygen saturation, activity tolerance    Progress made toward(s) clinical / shift goals:    Problem: Knowledge Deficit - Standard  Goal: Patient and family/care givers will demonstrate understanding of plan of care, disease process/condition, diagnostic tests and medications  Outcome: Progressing     Problem: Respiratory  Goal: Patient will achieve/maintain optimum respiratory ventilation and gas exchange  Outcome: Progressing   Pt self proning and motivated    Patient is not progressing towards the following goals:

## 2021-11-09 NOTE — PROGRESS NOTES
Began care at 1845, Report received from Lula BROWN. Patient in self proning, satting well, bps soft MD Chi aware. Plan of care updated with the patient, no questions at this time. Initial assessment completed, orders reviewed, call light within reach, and fall precautions are in place.

## 2021-11-09 NOTE — CARE PLAN
The patient is Watcher - Medium risk of patient condition declining or worsening    Shift Goals  Clinical Goals: maintain adequate O2  Patient Goals: comfort  Family Goals: georgie    Progress made toward(s) clinical / shift goals:  notified MD      Patient is not progressing towards the following goals:

## 2021-11-10 LAB
ALBUMIN SERPL BCP-MCNC: 3.8 G/DL (ref 3.2–4.9)
ALBUMIN/GLOB SERPL: 1.1 G/DL
ALP SERPL-CCNC: 78 U/L (ref 30–99)
ALT SERPL-CCNC: 71 U/L (ref 2–50)
ANION GAP SERPL CALC-SCNC: 9 MMOL/L (ref 7–16)
AST SERPL-CCNC: 54 U/L (ref 12–45)
BASOPHILS # BLD AUTO: 0.4 % (ref 0–1.8)
BASOPHILS # BLD: 0.05 K/UL (ref 0–0.12)
BILIRUB SERPL-MCNC: 0.6 MG/DL (ref 0.1–1.5)
BUN SERPL-MCNC: 21 MG/DL (ref 8–22)
CALCIUM SERPL-MCNC: 8.8 MG/DL (ref 8.5–10.5)
CHLORIDE SERPL-SCNC: 96 MMOL/L (ref 96–112)
CO2 SERPL-SCNC: 26 MMOL/L (ref 20–33)
CREAT SERPL-MCNC: 0.72 MG/DL (ref 0.5–1.4)
EOSINOPHIL # BLD AUTO: 0.31 K/UL (ref 0–0.51)
EOSINOPHIL NFR BLD: 2.2 % (ref 0–6.9)
ERYTHROCYTE [DISTWIDTH] IN BLOOD BY AUTOMATED COUNT: 41.5 FL (ref 35.9–50)
GLOBULIN SER CALC-MCNC: 3.5 G/DL (ref 1.9–3.5)
GLUCOSE SERPL-MCNC: 118 MG/DL (ref 65–99)
HCT VFR BLD AUTO: 38.4 % (ref 37–47)
HGB BLD-MCNC: 12.7 G/DL (ref 12–16)
IMM GRANULOCYTES # BLD AUTO: 0.41 K/UL (ref 0–0.11)
IMM GRANULOCYTES NFR BLD AUTO: 2.9 % (ref 0–0.9)
LYMPHOCYTES # BLD AUTO: 2.58 K/UL (ref 1–4.8)
LYMPHOCYTES NFR BLD: 18.1 % (ref 22–41)
MCH RBC QN AUTO: 29.3 PG (ref 27–33)
MCHC RBC AUTO-ENTMCNC: 33.1 G/DL (ref 33.6–35)
MCV RBC AUTO: 88.5 FL (ref 81.4–97.8)
MONOCYTES # BLD AUTO: 0.36 K/UL (ref 0–0.85)
MONOCYTES NFR BLD AUTO: 2.5 % (ref 0–13.4)
NEUTROPHILS # BLD AUTO: 10.52 K/UL (ref 2–7.15)
NEUTROPHILS NFR BLD: 73.9 % (ref 44–72)
NRBC # BLD AUTO: 0 K/UL
NRBC BLD-RTO: 0 /100 WBC
PLATELET # BLD AUTO: 345 K/UL (ref 164–446)
PMV BLD AUTO: 10.2 FL (ref 9–12.9)
POTASSIUM SERPL-SCNC: 3.4 MMOL/L (ref 3.6–5.5)
PROT SERPL-MCNC: 7.3 G/DL (ref 6–8.2)
RBC # BLD AUTO: 4.34 M/UL (ref 4.2–5.4)
SODIUM SERPL-SCNC: 131 MMOL/L (ref 135–145)
WBC # BLD AUTO: 14.2 K/UL (ref 4.8–10.8)

## 2021-11-10 PROCEDURE — A9270 NON-COVERED ITEM OR SERVICE: HCPCS | Performed by: INTERNAL MEDICINE

## 2021-11-10 PROCEDURE — 80053 COMPREHEN METABOLIC PANEL: CPT

## 2021-11-10 PROCEDURE — 36415 COLL VENOUS BLD VENIPUNCTURE: CPT

## 2021-11-10 PROCEDURE — A9270 NON-COVERED ITEM OR SERVICE: HCPCS | Performed by: STUDENT IN AN ORGANIZED HEALTH CARE EDUCATION/TRAINING PROGRAM

## 2021-11-10 PROCEDURE — 85025 COMPLETE CBC W/AUTO DIFF WBC: CPT

## 2021-11-10 PROCEDURE — 700111 HCHG RX REV CODE 636 W/ 250 OVERRIDE (IP): Performed by: STUDENT IN AN ORGANIZED HEALTH CARE EDUCATION/TRAINING PROGRAM

## 2021-11-10 PROCEDURE — 700102 HCHG RX REV CODE 250 W/ 637 OVERRIDE(OP): Performed by: INTERNAL MEDICINE

## 2021-11-10 PROCEDURE — A9270 NON-COVERED ITEM OR SERVICE: HCPCS | Performed by: HOSPITALIST

## 2021-11-10 PROCEDURE — 700102 HCHG RX REV CODE 250 W/ 637 OVERRIDE(OP): Performed by: STUDENT IN AN ORGANIZED HEALTH CARE EDUCATION/TRAINING PROGRAM

## 2021-11-10 PROCEDURE — 770001 HCHG ROOM/CARE - MED/SURG/GYN PRIV*

## 2021-11-10 PROCEDURE — 99233 SBSQ HOSP IP/OBS HIGH 50: CPT | Performed by: STUDENT IN AN ORGANIZED HEALTH CARE EDUCATION/TRAINING PROGRAM

## 2021-11-10 PROCEDURE — 700102 HCHG RX REV CODE 250 W/ 637 OVERRIDE(OP): Performed by: HOSPITALIST

## 2021-11-10 RX ADMIN — DOCUSATE SODIUM 50 MG AND SENNOSIDES 8.6 MG 2 TABLET: 8.6; 5 TABLET, FILM COATED ORAL at 04:36

## 2021-11-10 RX ADMIN — Medication 1000 UNITS: at 18:14

## 2021-11-10 RX ADMIN — ONDANSETRON 4 MG: 2 INJECTION INTRAMUSCULAR; INTRAVENOUS at 04:47

## 2021-11-10 RX ADMIN — MIDODRINE HYDROCHLORIDE 10 MG: 5 TABLET ORAL at 12:35

## 2021-11-10 RX ADMIN — DOCUSATE SODIUM 50 MG AND SENNOSIDES 8.6 MG 2 TABLET: 8.6; 5 TABLET, FILM COATED ORAL at 18:14

## 2021-11-10 RX ADMIN — BARICITINIB 4 MG: 2 TABLET, FILM COATED ORAL at 18:15

## 2021-11-10 RX ADMIN — FUROSEMIDE 40 MG: 40 TABLET ORAL at 04:36

## 2021-11-10 RX ADMIN — MIDODRINE HYDROCHLORIDE 10 MG: 5 TABLET ORAL at 08:03

## 2021-11-10 RX ADMIN — MIDODRINE HYDROCHLORIDE 10 MG: 5 TABLET ORAL at 18:15

## 2021-11-10 RX ADMIN — LORAZEPAM 0.5 MG: 2 INJECTION INTRAMUSCULAR; INTRAVENOUS at 20:38

## 2021-11-10 RX ADMIN — FAMOTIDINE 20 MG: 20 TABLET ORAL at 04:36

## 2021-11-10 RX ADMIN — ENOXAPARIN SODIUM 40 MG: 40 INJECTION SUBCUTANEOUS at 04:37

## 2021-11-10 RX ADMIN — Medication 1000 UNITS: at 08:03

## 2021-11-10 ASSESSMENT — ENCOUNTER SYMPTOMS
PALPITATIONS: 0
SPUTUM PRODUCTION: 0
ABDOMINAL PAIN: 0
COUGH: 1
HEMOPTYSIS: 0
FOCAL WEAKNESS: 0
NEUROLOGICAL NEGATIVE: 1
NERVOUS/ANXIOUS: 0
WEAKNESS: 0
EYES NEGATIVE: 1
FALLS: 0
BRUISES/BLEEDS EASILY: 0
SORE THROAT: 0
BLURRED VISION: 0
HEARTBURN: 1
VOMITING: 0
FEVER: 0
SHORTNESS OF BREATH: 1
CARDIOVASCULAR NEGATIVE: 1
WHEEZING: 0
DIZZINESS: 0
HEADACHES: 0
MYALGIAS: 0
MUSCULOSKELETAL NEGATIVE: 1
DOUBLE VISION: 0
NAUSEA: 1

## 2021-11-10 ASSESSMENT — LIFESTYLE VARIABLES: SUBSTANCE_ABUSE: 0

## 2021-11-10 ASSESSMENT — PAIN DESCRIPTION - PAIN TYPE: TYPE: ACUTE PAIN

## 2021-11-10 NOTE — CARE PLAN
The patient is Stable - Low risk of patient condition declining or worsening    Shift Goals  Clinical Goals: Maintain oxygen above 90%  Patient Goals: Sleep  Family Goals: ANTHONY    Progress made toward(s) clinical / shift goals:  Patient is maintaining oxygen levels above 90% on 15L non-rebreather mask. Patient is comfortable and is without pain.    Patient is not progressing towards the following goals: Patient has covid and is requiring 15L of oxygen on a non-rebreather mask. Goal is to wean her oxygen to lower levels.      Problem: Respiratory  Goal: Patient will achieve/maintain optimum respiratory ventilation and gas exchange  Outcome: Not Progressing

## 2021-11-10 NOTE — PROGRESS NOTES
Hospital Medicine Daily Progress Note    Date of Service  11/9/2021    Chief Complaint  Vicenta Saeed is a 66 y.o. female admitted 10/29/2021 with SOB    Hospital Course  Patient is a 66 year old female with no significant past medical history who presnted to the ER on 10/30/1 with dizziness, cough and fever. She has not been vaccinated and just returned from a long visit to [facility].  She developed flu like symptoms on 10/25/21 and outpatient covid testing was negative, however a test here was positive.    Her cxr was consistent with covid pneumonia.  Procalcitonin was low but d dimer elevated, she was found to have acute hypoxic respiratory failure with an oxygen saturation of 85% on RA.  Patient has been treated with Decadron and Remdesivir as well as midodrine for persistent hypotension.   She did intermittently require high flow but has since been titrated down to 15L mask. CTPE study was negative.   Hospital stay has been prolonged due to persistent hypoxia and high O2 needs.     Interval Problem Update  Patient seen and examined at bedside, no acute complaints aside from fatigue and easy desaturation with any type of movement.  She is proning without difficulty  -Still requiring 15 L oxygen mask, continue to wean as able  -Completed Decadron and remdesivir, continue baricitinib and date 11/16/2021    Consultants/Specialty  ID    Code Status  Full Code    Disposition  Patient is not medically cleared.   Anticipate discharge to to home with close outpatient follow-up.      Review of Systems  Review of Systems   Constitutional: Positive for malaise/fatigue. Negative for fever.        Poor appetite   HENT: Negative.  Negative for nosebleeds and sore throat.    Eyes: Negative.  Negative for blurred vision and double vision.   Respiratory: Positive for cough and shortness of breath. Negative for hemoptysis, sputum production and wheezing.    Cardiovascular: Negative.  Negative for chest pain, palpitations and  leg swelling.   Gastrointestinal: Positive for heartburn and nausea. Negative for abdominal pain and vomiting.   Genitourinary: Negative.  Negative for dysuria.   Musculoskeletal: Negative.  Negative for falls and myalgias.   Skin: Negative.  Negative for itching and rash.   Neurological: Negative.  Negative for dizziness, focal weakness, weakness and headaches.   Endo/Heme/Allergies: Negative.  Does not bruise/bleed easily.   Psychiatric/Behavioral: Negative for substance abuse. The patient is not nervous/anxious.    All other systems reviewed and are negative.       Physical Exam  Temp:  [35.9 °C (96.7 °F)-37.5 °C (99.5 °F)] 37.5 °C (99.5 °F)  Pulse:  [64-97] 83  Resp:  [18-20] 20  BP: ()/(53-61) 96/53  SpO2:  [95 %-100 %] 95 %    Physical Exam  Vitals and nursing note reviewed. Exam conducted with a chaperone present.   Constitutional:       Appearance: She is normal weight. She is ill-appearing.   HENT:      Head: Normocephalic.      Nose: Nose normal.      Mouth/Throat:      Mouth: Mucous membranes are moist.      Pharynx: Oropharynx is clear.   Eyes:      General: No scleral icterus.     Extraocular Movements: Extraocular movements intact.      Conjunctiva/sclera: Conjunctivae normal.   Cardiovascular:      Rate and Rhythm: Normal rate and regular rhythm.      Pulses: Normal pulses.      Heart sounds: Normal heart sounds.   Pulmonary:      Breath sounds: No stridor. No rhonchi or rales.      Comments: On 15 L mask, proning  Diminished breath sounds throughout  Abdominal:      General: There is no distension.      Palpations: Abdomen is soft.      Tenderness: There is no abdominal tenderness. There is no guarding or rebound.   Musculoskeletal:         General: No swelling or deformity. Normal range of motion.      Cervical back: Neck supple. No tenderness.   Skin:     General: Skin is warm and dry.      Capillary Refill: Capillary refill takes less than 2 seconds.   Neurological:      General: No focal  deficit present.      Mental Status: She is alert and oriented to person, place, and time.      Cranial Nerves: No cranial nerve deficit.   Psychiatric:         Mood and Affect: Mood normal.         Behavior: Behavior normal.         Fluids    Intake/Output Summary (Last 24 hours) at 11/9/2021 1657  Last data filed at 11/9/2021 0900  Gross per 24 hour   Intake 360 ml   Output --   Net 360 ml       Laboratory  Recent Labs     11/07/21  0307 11/08/21  0615 11/09/21  0353   WBC 10.9* 13.3* 13.9*   RBC 4.53 4.64 4.44   HEMOGLOBIN 13.5 14.1 13.4   HEMATOCRIT 39.9 41.0 38.8   MCV 88.1 88.4 87.4   MCH 29.8 30.4 30.2   MCHC 33.8 34.4 34.5   RDW 41.4 41.8 40.5   PLATELETCT 256 302 322   MPV 10.1 10.6 10.2     Recent Labs     11/07/21  0307 11/08/21  0615 11/09/21  0353   SODIUM 136 131* 132*   POTASSIUM 3.7 3.6 3.9   CHLORIDE 101 95* 96   CO2 21 22 23   GLUCOSE 95 105* 105*   BUN 18 25* 27*   CREATININE 0.71 0.95 0.78   CALCIUM 8.9 9.2 9.1                   Imaging  CT-CTA CHEST PULMONARY ARTERY W/ RECONS   Final Result         1. No CT evidence of pulmonary embolism.      2. Extensive groundglass and airspace opacity throughout both lungs, in keeping with Covid 19 pneumonia.         DX-CHEST-LIMITED (1 VIEW)   Final Result         1.  There is some decrease in opacification laterally in the left lung compared to the prior radiograph.      2.  Some persistent opacifications are noted which are most prominent in the right upper lobe consistent with pneumonia.      3.  No new infiltrates or consolidations are identified.      US-EXTREMITY VENOUS LOWER BILAT   Final Result      DX-CHEST-PORTABLE (1 VIEW)   Final Result         1.  Patchy bilateral pulmonary infiltrates   2.  Atherosclerosis           Assessment/Plan  * Acute hypoxemic respiratory failure due to COVID-19 (HCC)  Assessment & Plan  Positive antigen 10/29, +PCR 11/2  Procal WNL  CRP 1.93>7.7  US doppler negative DVT b/l 10/31  CTA PE negative PE  Decadron 6mg x10  "doses (completed)  S/p remdesivir x1 dose 11/2  Baricitinib started 11/3 and day 11/16/2021,   Lasix when BP improved -- unable to due to hypotension  Supportive managements: early mobilization, self prone, RT protocol, judicious fluid, IS  Wean off O2 as tolerated    Hyponatremia  Assessment & Plan  Sec to poor oral intake and covid, mild  Encourage oral intake  Boost  Cont monitoring    GERD (gastroesophageal reflux disease)  Assessment & Plan  pepcid    ACP (advance care planning)  Assessment & Plan  Plan of care discussed with patient -- optimize COVID treatment, wean off HFNC support, patient stated \"I will come out of this\" and requests to keep as full code status  FULL code status confirmed    Transaminitis  Assessment & Plan  Mild, likely due to COVID    Hypocalcemia  Assessment & Plan  resolved    Thrombocytopenia (HCC)  Assessment & Plan  resolved    Hypotension due to hypovolemia- (present on admission)  Assessment & Plan  Avoid excess fluid due to COVID-19  Encourage oral intake/hydration  Continue midodrine 10 mg 3 times daily, wean as able       VTE prophylaxis: enoxaparin ppx    I have performed a physical exam and reviewed and updated ROS and Plan today (11/9/2021). In review of yesterday's note (11/8/2021), there are no changes except as documented above.  "

## 2021-11-10 NOTE — DISCHARGE PLANNING
Agency/Facility Name: [facility]  Spoke To: Yuan  Outcome: Per yuan once pt is ready for DC they would need new testing and orders for O2.

## 2021-11-10 NOTE — PROGRESS NOTES
Hospital Medicine Daily Progress Note    Date of Service  11/10/2021    Chief Complaint  Vicenta Saeed is a 66 y.o. female admitted 10/29/2021 with SOB    Hospital Course  Patient is a 66 year old female with no significant past medical history who presnted to the ER on 10/30/1 with dizziness, cough and fever. She has not been vaccinated and just returned from a long visit to [facility].  She developed flu like symptoms on 10/25/21 and outpatient covid testing was negative, however a test here was positive.    Her cxr was consistent with covid pneumonia.  Procalcitonin was low but d dimer elevated, she was found to have acute hypoxic respiratory failure with an oxygen saturation of 85% on RA.  Patient has been treated with Decadron and Remdesivir as well as midodrine for persistent hypotension.   She did intermittently require high flow but has since been titrated down to 15L mask. CTPE study was negative.   Hospital stay has been prolonged due to persistent hypoxia and high O2 needs.     Interval Problem Update  Patient seen at bedside, no  New issues, still with High O2 needs, currently on 14L, states her spirits are up today, feeling a bit better, less coughing when up to bathroom   - continue to wean as able  -Completed Decadron and remdesivir, continue baricitinib and date 11/16/2021    Consultants/Specialty  ID    Code Status  Full Code    Disposition  Patient is not medically cleared.   Anticipate discharge to to home with close outpatient follow-up.      Review of Systems  Review of Systems   Constitutional: Positive for malaise/fatigue. Negative for fever.        Poor appetite   HENT: Negative.  Negative for nosebleeds and sore throat.    Eyes: Negative.  Negative for blurred vision and double vision.   Respiratory: Positive for cough and shortness of breath. Negative for hemoptysis, sputum production and wheezing.    Cardiovascular: Negative.  Negative for chest pain, palpitations and leg swelling.    Gastrointestinal: Positive for heartburn and nausea. Negative for abdominal pain and vomiting.   Genitourinary: Negative.  Negative for dysuria.   Musculoskeletal: Negative.  Negative for falls and myalgias.   Skin: Negative.  Negative for itching and rash.   Neurological: Negative.  Negative for dizziness, focal weakness, weakness and headaches.   Endo/Heme/Allergies: Negative.  Does not bruise/bleed easily.   Psychiatric/Behavioral: Negative for substance abuse. The patient is not nervous/anxious.    All other systems reviewed and are negative.       Physical Exam  Temp:  [36.3 °C (97.4 °F)-36.6 °C (97.9 °F)] 36.3 °C (97.4 °F)  Pulse:  [73-85] 73  Resp:  [18-20] 18  BP: ()/(61-63) 100/63  SpO2:  [95 %-98 %] 95 %    Physical Exam  Vitals and nursing note reviewed. Exam conducted with a chaperone present.   Constitutional:       Appearance: She is normal weight. She is ill-appearing.   HENT:      Head: Normocephalic.      Nose: Nose normal.      Mouth/Throat:      Mouth: Mucous membranes are moist.      Pharynx: Oropharynx is clear.   Eyes:      General: No scleral icterus.     Extraocular Movements: Extraocular movements intact.      Conjunctiva/sclera: Conjunctivae normal.   Cardiovascular:      Rate and Rhythm: Normal rate and regular rhythm.      Pulses: Normal pulses.      Heart sounds: Normal heart sounds.   Pulmonary:      Breath sounds: No stridor. No rhonchi or rales.      Comments: On 15 L mask, proning  Diminished breath sounds throughout  Abdominal:      General: There is no distension.      Palpations: Abdomen is soft.      Tenderness: There is no abdominal tenderness. There is no guarding or rebound.   Musculoskeletal:         General: No swelling or deformity. Normal range of motion.      Cervical back: Neck supple. No tenderness.   Skin:     General: Skin is warm and dry.      Capillary Refill: Capillary refill takes less than 2 seconds.   Neurological:      General: No focal deficit present.       Mental Status: She is alert and oriented to person, place, and time.      Cranial Nerves: No cranial nerve deficit.   Psychiatric:         Mood and Affect: Mood normal.         Behavior: Behavior normal.         Fluids    Intake/Output Summary (Last 24 hours) at 11/10/2021 1647  Last data filed at 11/10/2021 0900  Gross per 24 hour   Intake 170 ml   Output --   Net 170 ml       Laboratory  Recent Labs     11/08/21  0615 11/09/21  0353 11/10/21  0317   WBC 13.3* 13.9* 14.2*   RBC 4.64 4.44 4.34   HEMOGLOBIN 14.1 13.4 12.7   HEMATOCRIT 41.0 38.8 38.4   MCV 88.4 87.4 88.5   MCH 30.4 30.2 29.3   MCHC 34.4 34.5 33.1*   RDW 41.8 40.5 41.5   PLATELETCT 302 322 345   MPV 10.6 10.2 10.2     Recent Labs     11/08/21  0615 11/09/21  0353 11/10/21  0317   SODIUM 131* 132* 131*   POTASSIUM 3.6 3.9 3.4*   CHLORIDE 95* 96 96   CO2 22 23 26   GLUCOSE 105* 105* 118*   BUN 25* 27* 21   CREATININE 0.95 0.78 0.72   CALCIUM 9.2 9.1 8.8                   Imaging  CT-CTA CHEST PULMONARY ARTERY W/ RECONS   Final Result         1. No CT evidence of pulmonary embolism.      2. Extensive groundglass and airspace opacity throughout both lungs, in keeping with Covid 19 pneumonia.         DX-CHEST-LIMITED (1 VIEW)   Final Result         1.  There is some decrease in opacification laterally in the left lung compared to the prior radiograph.      2.  Some persistent opacifications are noted which are most prominent in the right upper lobe consistent with pneumonia.      3.  No new infiltrates or consolidations are identified.      US-EXTREMITY VENOUS LOWER BILAT   Final Result      DX-CHEST-PORTABLE (1 VIEW)   Final Result         1.  Patchy bilateral pulmonary infiltrates   2.  Atherosclerosis           Assessment/Plan  * Acute hypoxemic respiratory failure due to COVID-19 (HCC)  Assessment & Plan  Positive antigen 10/29, +PCR 11/2  Procal WNL  CRP 1.93>7.7  US doppler negative DVT b/l 10/31  CTA PE negative PE  Decadron 6mg x10 doses  "(completed)  S/p remdesivir x1 dose 11/2  Baricitinib started 11/3 and day 11/16/2021,   Lasix when BP improved -- unable to due to hypotension  Supportive managements: early mobilization, self prone, RT protocol, judicious fluid, IS  Wean off O2 as tolerated    Hyponatremia  Assessment & Plan  Sec to poor oral intake and covid, mild  Encourage oral intake  Boost  Cont monitoring    GERD (gastroesophageal reflux disease)  Assessment & Plan  pepcid    ACP (advance care planning)  Assessment & Plan  Plan of care discussed with patient -- optimize COVID treatment, wean off HFNC support, patient stated \"I will come out of this\" and requests to keep as full code status  FULL code status confirmed    Transaminitis  Assessment & Plan  Mild, likely due to COVID    Hypocalcemia  Assessment & Plan  resolved    Thrombocytopenia (HCC)  Assessment & Plan  resolved    Hypotension due to hypovolemia- (present on admission)  Assessment & Plan  Avoid excess fluid due to COVID-19  Encourage oral intake/hydration  Continue midodrine 10 mg 3 times daily, wean as able       VTE prophylaxis: enoxaparin ppx    I have performed a physical exam and reviewed and updated ROS and Plan today (11/10/2021). In review of yesterday's note (11/9/2021), there are no changes except as documented above.  "

## 2021-11-10 NOTE — CARE PLAN
The patient is Stable - Low risk of patient condition declining or worsening    Shift Goals  Clinical Goals: maintain oxygenation  Patient Goals: rest  Family Goals: N/A    Progress made toward(s) clinical / shift goals:    Problem: Knowledge Deficit - Standard  Goal: Patient and family/care givers will demonstrate understanding of plan of care, disease process/condition, diagnostic tests and medications  Outcome: Progressing     Problem: Respiratory  Goal: Patient will achieve/maintain optimum respiratory ventilation and gas exchange  Outcome: Progressing       Patient is not progressing towards the following goals:

## 2021-11-11 LAB
ALBUMIN SERPL BCP-MCNC: 3.6 G/DL (ref 3.2–4.9)
ALBUMIN/GLOB SERPL: 0.9 G/DL
ALP SERPL-CCNC: 79 U/L (ref 30–99)
ALT SERPL-CCNC: 76 U/L (ref 2–50)
ANION GAP SERPL CALC-SCNC: 12 MMOL/L (ref 7–16)
AST SERPL-CCNC: 46 U/L (ref 12–45)
BASOPHILS # BLD AUTO: 0.5 % (ref 0–1.8)
BASOPHILS # BLD: 0.06 K/UL (ref 0–0.12)
BILIRUB SERPL-MCNC: 0.6 MG/DL (ref 0.1–1.5)
BUN SERPL-MCNC: 19 MG/DL (ref 8–22)
CALCIUM SERPL-MCNC: 9 MG/DL (ref 8.5–10.5)
CHLORIDE SERPL-SCNC: 93 MMOL/L (ref 96–112)
CO2 SERPL-SCNC: 26 MMOL/L (ref 20–33)
CREAT SERPL-MCNC: 0.73 MG/DL (ref 0.5–1.4)
EOSINOPHIL # BLD AUTO: 0.28 K/UL (ref 0–0.51)
EOSINOPHIL NFR BLD: 2.5 % (ref 0–6.9)
ERYTHROCYTE [DISTWIDTH] IN BLOOD BY AUTOMATED COUNT: 40.7 FL (ref 35.9–50)
GLOBULIN SER CALC-MCNC: 3.9 G/DL (ref 1.9–3.5)
GLUCOSE SERPL-MCNC: 100 MG/DL (ref 65–99)
HCT VFR BLD AUTO: 39.8 % (ref 37–47)
HGB BLD-MCNC: 13.6 G/DL (ref 12–16)
IMM GRANULOCYTES # BLD AUTO: 0.48 K/UL (ref 0–0.11)
IMM GRANULOCYTES NFR BLD AUTO: 4.3 % (ref 0–0.9)
LYMPHOCYTES # BLD AUTO: 2.73 K/UL (ref 1–4.8)
LYMPHOCYTES NFR BLD: 24.3 % (ref 22–41)
MCH RBC QN AUTO: 30.2 PG (ref 27–33)
MCHC RBC AUTO-ENTMCNC: 34.2 G/DL (ref 33.6–35)
MCV RBC AUTO: 88.4 FL (ref 81.4–97.8)
MONOCYTES # BLD AUTO: 0.41 K/UL (ref 0–0.85)
MONOCYTES NFR BLD AUTO: 3.6 % (ref 0–13.4)
NEUTROPHILS # BLD AUTO: 7.28 K/UL (ref 2–7.15)
NEUTROPHILS NFR BLD: 64.8 % (ref 44–72)
NRBC # BLD AUTO: 0 K/UL
NRBC BLD-RTO: 0 /100 WBC
PLATELET # BLD AUTO: 344 K/UL (ref 164–446)
PMV BLD AUTO: 10.1 FL (ref 9–12.9)
POTASSIUM SERPL-SCNC: 3.6 MMOL/L (ref 3.6–5.5)
PROT SERPL-MCNC: 7.5 G/DL (ref 6–8.2)
RBC # BLD AUTO: 4.5 M/UL (ref 4.2–5.4)
SODIUM SERPL-SCNC: 131 MMOL/L (ref 135–145)
WBC # BLD AUTO: 11.2 K/UL (ref 4.8–10.8)

## 2021-11-11 PROCEDURE — 700105 HCHG RX REV CODE 258: Performed by: NURSE PRACTITIONER

## 2021-11-11 PROCEDURE — 770001 HCHG ROOM/CARE - MED/SURG/GYN PRIV*

## 2021-11-11 PROCEDURE — 36415 COLL VENOUS BLD VENIPUNCTURE: CPT

## 2021-11-11 PROCEDURE — 700111 HCHG RX REV CODE 636 W/ 250 OVERRIDE (IP): Performed by: STUDENT IN AN ORGANIZED HEALTH CARE EDUCATION/TRAINING PROGRAM

## 2021-11-11 PROCEDURE — A9270 NON-COVERED ITEM OR SERVICE: HCPCS | Performed by: HOSPITALIST

## 2021-11-11 PROCEDURE — 85025 COMPLETE CBC W/AUTO DIFF WBC: CPT

## 2021-11-11 PROCEDURE — 700102 HCHG RX REV CODE 250 W/ 637 OVERRIDE(OP): Performed by: HOSPITALIST

## 2021-11-11 PROCEDURE — 99233 SBSQ HOSP IP/OBS HIGH 50: CPT | Performed by: STUDENT IN AN ORGANIZED HEALTH CARE EDUCATION/TRAINING PROGRAM

## 2021-11-11 PROCEDURE — A9270 NON-COVERED ITEM OR SERVICE: HCPCS | Performed by: STUDENT IN AN ORGANIZED HEALTH CARE EDUCATION/TRAINING PROGRAM

## 2021-11-11 PROCEDURE — 700102 HCHG RX REV CODE 250 W/ 637 OVERRIDE(OP): Performed by: INTERNAL MEDICINE

## 2021-11-11 PROCEDURE — 700102 HCHG RX REV CODE 250 W/ 637 OVERRIDE(OP): Performed by: STUDENT IN AN ORGANIZED HEALTH CARE EDUCATION/TRAINING PROGRAM

## 2021-11-11 PROCEDURE — A9270 NON-COVERED ITEM OR SERVICE: HCPCS | Performed by: INTERNAL MEDICINE

## 2021-11-11 PROCEDURE — 80053 COMPREHEN METABOLIC PANEL: CPT

## 2021-11-11 RX ORDER — FUROSEMIDE 20 MG/1
20 TABLET ORAL
Status: DISCONTINUED | OUTPATIENT
Start: 2021-11-12 | End: 2021-11-11

## 2021-11-11 RX ORDER — SODIUM CHLORIDE, SODIUM LACTATE, POTASSIUM CHLORIDE, AND CALCIUM CHLORIDE .6; .31; .03; .02 G/100ML; G/100ML; G/100ML; G/100ML
250 INJECTION, SOLUTION INTRAVENOUS ONCE
Status: COMPLETED | OUTPATIENT
Start: 2021-11-11 | End: 2021-11-11

## 2021-11-11 RX ADMIN — Medication 1000 UNITS: at 17:06

## 2021-11-11 RX ADMIN — BARICITINIB 4 MG: 2 TABLET, FILM COATED ORAL at 17:06

## 2021-11-11 RX ADMIN — ENOXAPARIN SODIUM 40 MG: 40 INJECTION SUBCUTANEOUS at 04:37

## 2021-11-11 RX ADMIN — MIDODRINE HYDROCHLORIDE 10 MG: 5 TABLET ORAL at 09:40

## 2021-11-11 RX ADMIN — Medication 1000 UNITS: at 09:40

## 2021-11-11 RX ADMIN — FAMOTIDINE 20 MG: 20 TABLET ORAL at 17:06

## 2021-11-11 RX ADMIN — MIDODRINE HYDROCHLORIDE 10 MG: 5 TABLET ORAL at 13:30

## 2021-11-11 RX ADMIN — MIDODRINE HYDROCHLORIDE 10 MG: 5 TABLET ORAL at 17:05

## 2021-11-11 RX ADMIN — SODIUM CHLORIDE, POTASSIUM CHLORIDE, SODIUM LACTATE AND CALCIUM CHLORIDE 250 ML: 600; 310; 30; 20 INJECTION, SOLUTION INTRAVENOUS at 04:34

## 2021-11-11 RX ADMIN — DOCUSATE SODIUM 50 MG AND SENNOSIDES 8.6 MG 2 TABLET: 8.6; 5 TABLET, FILM COATED ORAL at 04:37

## 2021-11-11 RX ADMIN — FAMOTIDINE 20 MG: 20 TABLET ORAL at 04:37

## 2021-11-11 RX ADMIN — DOCUSATE SODIUM 50 MG AND SENNOSIDES 8.6 MG 2 TABLET: 8.6; 5 TABLET, FILM COATED ORAL at 17:06

## 2021-11-11 ASSESSMENT — ENCOUNTER SYMPTOMS
EYES NEGATIVE: 1
BLURRED VISION: 0
COUGH: 1
SHORTNESS OF BREATH: 1
MUSCULOSKELETAL NEGATIVE: 1
WEAKNESS: 0
HEADACHES: 0
BRUISES/BLEEDS EASILY: 0
ABDOMINAL PAIN: 0
CARDIOVASCULAR NEGATIVE: 1
MYALGIAS: 0
SORE THROAT: 0
PALPITATIONS: 0
VOMITING: 0
NEUROLOGICAL NEGATIVE: 1
NERVOUS/ANXIOUS: 0
FEVER: 0
DIZZINESS: 0
FALLS: 0
HEMOPTYSIS: 0
FOCAL WEAKNESS: 0
NAUSEA: 0
WHEEZING: 0
SPUTUM PRODUCTION: 0
DOUBLE VISION: 0
HEARTBURN: 0

## 2021-11-11 ASSESSMENT — LIFESTYLE VARIABLES: SUBSTANCE_ABUSE: 0

## 2021-11-11 NOTE — PROGRESS NOTES
Bedside report received and patient care assumed. Pt is resting in bed, A&Ox4, with no complaints of pain, and is on 12L NRB. All fall precautions are in place, belongings at bedside table.  Pt was updated on POC, no questions or concerns. Pt educated on use of call light for assistance.     Crisis Charting: COVID-19 surge in effect

## 2021-11-11 NOTE — PROGRESS NOTES
Pt sustaining HoTN. VS: 84/58, 77, 18, 94% on 12L oxymask.     MD notified. Per MD new orders to follow, okay to hold morning dose of Lasix. Per MD continue to monitor and update.

## 2021-11-11 NOTE — CARE PLAN
The patient is Stable - Low risk of patient condition declining or worsening    Shift Goals  Clinical Goals: Maintain adequate o2 sats, oxygenation and ventilation, wean / titrate down supplemental o2 as tolerated, monitor diagostic labs, maintain hemodynamic stability  Patient Goals: Comfort, rest  Family Goals: ANTHONY. No family present.       Progress made toward(s) clinical / shift goals:      Problem: Knowledge Deficit - Standard  Goal: Patient and family/care givers will demonstrate understanding of plan of care, disease process/condition, diagnostic tests and medications  Outcome: Progressing     Problem: Respiratory  Goal: Patient will achieve/maintain optimum respiratory ventilation and gas exchange  Outcome: Progressing       Patient is not progressing towards the following goals: NA

## 2021-11-11 NOTE — CARE PLAN
The patient is Stable - Low risk of patient condition declining or worsening    Shift Goals  Clinical Goals: wean o2  Patient Goals: wean o2  Family Goals: ANTHONY. No family present.

## 2021-11-12 LAB
ALBUMIN SERPL BCP-MCNC: 3.6 G/DL (ref 3.2–4.9)
ALBUMIN/GLOB SERPL: 1 G/DL
ALP SERPL-CCNC: 72 U/L (ref 30–99)
ALT SERPL-CCNC: 76 U/L (ref 2–50)
ANION GAP SERPL CALC-SCNC: 9 MMOL/L (ref 7–16)
AST SERPL-CCNC: 47 U/L (ref 12–45)
BASOPHILS # BLD AUTO: 1 % (ref 0–1.8)
BASOPHILS # BLD: 0.1 K/UL (ref 0–0.12)
BILIRUB SERPL-MCNC: 0.4 MG/DL (ref 0.1–1.5)
BUN SERPL-MCNC: 13 MG/DL (ref 8–22)
CALCIUM SERPL-MCNC: 9 MG/DL (ref 8.5–10.5)
CHLORIDE SERPL-SCNC: 101 MMOL/L (ref 96–112)
CO2 SERPL-SCNC: 28 MMOL/L (ref 20–33)
CREAT SERPL-MCNC: 0.67 MG/DL (ref 0.5–1.4)
EOSINOPHIL # BLD AUTO: 0.32 K/UL (ref 0–0.51)
EOSINOPHIL NFR BLD: 3.1 % (ref 0–6.9)
ERYTHROCYTE [DISTWIDTH] IN BLOOD BY AUTOMATED COUNT: 41.1 FL (ref 35.9–50)
GLOBULIN SER CALC-MCNC: 3.5 G/DL (ref 1.9–3.5)
GLUCOSE SERPL-MCNC: 148 MG/DL (ref 65–99)
HCT VFR BLD AUTO: 38.7 % (ref 37–47)
HGB BLD-MCNC: 12.7 G/DL (ref 12–16)
IMM GRANULOCYTES # BLD AUTO: 0.3 K/UL (ref 0–0.11)
IMM GRANULOCYTES NFR BLD AUTO: 2.9 % (ref 0–0.9)
LYMPHOCYTES # BLD AUTO: 1.87 K/UL (ref 1–4.8)
LYMPHOCYTES NFR BLD: 18.3 % (ref 22–41)
MCH RBC QN AUTO: 29.2 PG (ref 27–33)
MCHC RBC AUTO-ENTMCNC: 32.8 G/DL (ref 33.6–35)
MCV RBC AUTO: 89 FL (ref 81.4–97.8)
MONOCYTES # BLD AUTO: 0.5 K/UL (ref 0–0.85)
MONOCYTES NFR BLD AUTO: 4.9 % (ref 0–13.4)
NEUTROPHILS # BLD AUTO: 7.13 K/UL (ref 2–7.15)
NEUTROPHILS NFR BLD: 69.8 % (ref 44–72)
NRBC # BLD AUTO: 0 K/UL
NRBC BLD-RTO: 0 /100 WBC
PLATELET # BLD AUTO: 370 K/UL (ref 164–446)
PMV BLD AUTO: 10.1 FL (ref 9–12.9)
POTASSIUM SERPL-SCNC: 3.7 MMOL/L (ref 3.6–5.5)
PROT SERPL-MCNC: 7.1 G/DL (ref 6–8.2)
RBC # BLD AUTO: 4.35 M/UL (ref 4.2–5.4)
SODIUM SERPL-SCNC: 138 MMOL/L (ref 135–145)
WBC # BLD AUTO: 10.2 K/UL (ref 4.8–10.8)

## 2021-11-12 PROCEDURE — 99233 SBSQ HOSP IP/OBS HIGH 50: CPT | Performed by: STUDENT IN AN ORGANIZED HEALTH CARE EDUCATION/TRAINING PROGRAM

## 2021-11-12 PROCEDURE — 700102 HCHG RX REV CODE 250 W/ 637 OVERRIDE(OP): Performed by: STUDENT IN AN ORGANIZED HEALTH CARE EDUCATION/TRAINING PROGRAM

## 2021-11-12 PROCEDURE — 770001 HCHG ROOM/CARE - MED/SURG/GYN PRIV*

## 2021-11-12 PROCEDURE — 700102 HCHG RX REV CODE 250 W/ 637 OVERRIDE(OP): Performed by: INTERNAL MEDICINE

## 2021-11-12 PROCEDURE — A9270 NON-COVERED ITEM OR SERVICE: HCPCS | Performed by: STUDENT IN AN ORGANIZED HEALTH CARE EDUCATION/TRAINING PROGRAM

## 2021-11-12 PROCEDURE — 80053 COMPREHEN METABOLIC PANEL: CPT

## 2021-11-12 PROCEDURE — 36415 COLL VENOUS BLD VENIPUNCTURE: CPT

## 2021-11-12 PROCEDURE — A9270 NON-COVERED ITEM OR SERVICE: HCPCS | Performed by: HOSPITALIST

## 2021-11-12 PROCEDURE — 700102 HCHG RX REV CODE 250 W/ 637 OVERRIDE(OP): Performed by: HOSPITALIST

## 2021-11-12 PROCEDURE — 85025 COMPLETE CBC W/AUTO DIFF WBC: CPT

## 2021-11-12 PROCEDURE — A9270 NON-COVERED ITEM OR SERVICE: HCPCS | Performed by: INTERNAL MEDICINE

## 2021-11-12 PROCEDURE — 700111 HCHG RX REV CODE 636 W/ 250 OVERRIDE (IP): Performed by: STUDENT IN AN ORGANIZED HEALTH CARE EDUCATION/TRAINING PROGRAM

## 2021-11-12 RX ADMIN — Medication 1000 UNITS: at 08:02

## 2021-11-12 RX ADMIN — DOCUSATE SODIUM 50 MG AND SENNOSIDES 8.6 MG 2 TABLET: 8.6; 5 TABLET, FILM COATED ORAL at 04:21

## 2021-11-12 RX ADMIN — ENOXAPARIN SODIUM 40 MG: 40 INJECTION SUBCUTANEOUS at 04:20

## 2021-11-12 RX ADMIN — FAMOTIDINE 20 MG: 20 TABLET ORAL at 04:21

## 2021-11-12 RX ADMIN — MIDODRINE HYDROCHLORIDE 10 MG: 5 TABLET ORAL at 11:27

## 2021-11-12 RX ADMIN — BARICITINIB 4 MG: 2 TABLET, FILM COATED ORAL at 17:04

## 2021-11-12 RX ADMIN — FAMOTIDINE 20 MG: 20 TABLET ORAL at 17:04

## 2021-11-12 RX ADMIN — Medication 1000 UNITS: at 17:03

## 2021-11-12 RX ADMIN — MIDODRINE HYDROCHLORIDE 10 MG: 5 TABLET ORAL at 17:04

## 2021-11-12 RX ADMIN — MIDODRINE HYDROCHLORIDE 10 MG: 5 TABLET ORAL at 08:03

## 2021-11-12 RX ADMIN — DOCUSATE SODIUM 50 MG AND SENNOSIDES 8.6 MG 2 TABLET: 8.6; 5 TABLET, FILM COATED ORAL at 17:06

## 2021-11-12 ASSESSMENT — ENCOUNTER SYMPTOMS
COUGH: 1
VOMITING: 0
DOUBLE VISION: 0
EYES NEGATIVE: 1
FEVER: 0
MUSCULOSKELETAL NEGATIVE: 1
NERVOUS/ANXIOUS: 0
HEMOPTYSIS: 0
FOCAL WEAKNESS: 0
SPUTUM PRODUCTION: 0
DIZZINESS: 0
PALPITATIONS: 0
NEUROLOGICAL NEGATIVE: 1
MYALGIAS: 0
FALLS: 0
NAUSEA: 0
HEARTBURN: 0
BRUISES/BLEEDS EASILY: 0
SORE THROAT: 0
WHEEZING: 0
CARDIOVASCULAR NEGATIVE: 1
HEADACHES: 0
ABDOMINAL PAIN: 0
BLURRED VISION: 0
SHORTNESS OF BREATH: 1
WEAKNESS: 0

## 2021-11-12 ASSESSMENT — LIFESTYLE VARIABLES: SUBSTANCE_ABUSE: 0

## 2021-11-12 ASSESSMENT — PAIN DESCRIPTION - PAIN TYPE: TYPE: ACUTE PAIN

## 2021-11-12 NOTE — PROGRESS NOTES
Home Monitoring    Home Monitoring Declined: Patient could not be set up for home monitoring at this time due to insurance coverage.

## 2021-11-12 NOTE — PROGRESS NOTES
Hospital Medicine Daily Progress Note    Date of Service  11/12/2021    Chief Complaint  Vicenta Saeed is a 66 y.o. female admitted 10/29/2021 with SOB    Hospital Course  Patient is a 66 year old female with no significant past medical history who presnted to the ER on 10/30/1 with dizziness, cough and fever. She has not been vaccinated and just returned from a long visit to [facility].  She developed flu like symptoms on 10/25/21 and outpatient covid testing was negative, however a test here was positive.    Her cxr was consistent with covid pneumonia.  Procalcitonin was low but d dimer elevated, she was found to have acute hypoxic respiratory failure with an oxygen saturation of 85% on RA.  Patient has been treated with Decadron and Remdesivir as well as midodrine for persistent hypotension.   She did intermittently require high flow but has since been titrated down to 15L mask. CTPE study was negative.   Hospital stay has been prolonged due to persistent hypoxia and high O2 needs.     Interval Problem Update  Patient seen at bedside, no new issues, continues to feel a bit stronger and better every day, had better activity tolerance today although still with fatigue with activity   -Titrated down to 7 L , was able to ambulate and maintain oxygen saturation at 89%   - continue to wean as able, patient is able to maintain his oxygen saturation with activity over the next 24 hours plan for discharge home, home oxygen has been ordered  -Continues to have soft pressures however MAP >65, would avoid fluids if MAP >65  -Completed Decadron and remdesivir, continue baricitinib and date 11/16/2021    Consultants/Specialty  ID    Code Status  Full Code    Disposition  Patient is not medically cleared.   Anticipate discharge to to home with close outpatient follow-up.      Review of Systems  Review of Systems   Constitutional: Positive for malaise/fatigue. Negative for fever.        Poor appetite   HENT: Negative.   Negative for nosebleeds and sore throat.    Eyes: Negative.  Negative for blurred vision and double vision.   Respiratory: Positive for cough and shortness of breath. Negative for hemoptysis, sputum production and wheezing.    Cardiovascular: Negative.  Negative for chest pain, palpitations and leg swelling.   Gastrointestinal: Negative for abdominal pain, heartburn, nausea and vomiting.   Genitourinary: Negative.  Negative for dysuria.   Musculoskeletal: Negative.  Negative for falls and myalgias.   Skin: Negative.  Negative for itching and rash.   Neurological: Negative.  Negative for dizziness, focal weakness, weakness and headaches.   Endo/Heme/Allergies: Negative.  Does not bruise/bleed easily.   Psychiatric/Behavioral: Negative for substance abuse. The patient is not nervous/anxious.    All other systems reviewed and are negative.       Physical Exam  Temp:  [36.1 °C (97 °F)-37.1 °C (98.7 °F)] 36.1 °C (97 °F)  Pulse:  [64-71] 65  Resp:  [16-19] 19  BP: ()/(55-63) 97/55  SpO2:  [90 %-98 %] 90 %    Physical Exam  Vitals and nursing note reviewed. Exam conducted with a chaperone present.   Constitutional:       General: She is not in acute distress.     Appearance: She is normal weight. She is not ill-appearing.   HENT:      Head: Normocephalic.      Nose: Nose normal.      Mouth/Throat:      Mouth: Mucous membranes are moist.      Pharynx: Oropharynx is clear.   Eyes:      General: No scleral icterus.     Extraocular Movements: Extraocular movements intact.      Conjunctiva/sclera: Conjunctivae normal.   Cardiovascular:      Rate and Rhythm: Normal rate and regular rhythm.      Pulses: Normal pulses.      Heart sounds: Normal heart sounds.   Pulmonary:      Breath sounds: No stridor. No rhonchi or rales.      Comments: On 7 L NC  Diminished breath sounds throughout  Abdominal:      General: There is no distension.      Palpations: Abdomen is soft.      Tenderness: There is no abdominal tenderness. There is  no guarding or rebound.   Musculoskeletal:         General: No swelling or deformity. Normal range of motion.      Cervical back: Neck supple. No tenderness.   Skin:     General: Skin is warm and dry.      Capillary Refill: Capillary refill takes less than 2 seconds.   Neurological:      General: No focal deficit present.      Mental Status: She is alert and oriented to person, place, and time.      Cranial Nerves: No cranial nerve deficit.   Psychiatric:         Mood and Affect: Mood normal.         Behavior: Behavior normal.         Fluids    Intake/Output Summary (Last 24 hours) at 11/12/2021 1525  Last data filed at 11/12/2021 0400  Gross per 24 hour   Intake 360 ml   Output --   Net 360 ml       Laboratory  Recent Labs     11/10/21  0317 11/11/21  0237 11/12/21  1059   WBC 14.2* 11.2* 10.2   RBC 4.34 4.50 4.35   HEMOGLOBIN 12.7 13.6 12.7   HEMATOCRIT 38.4 39.8 38.7   MCV 88.5 88.4 89.0   MCH 29.3 30.2 29.2   MCHC 33.1* 34.2 32.8*   RDW 41.5 40.7 41.1   PLATELETCT 345 344 370   MPV 10.2 10.1 10.1     Recent Labs     11/10/21  0317 11/11/21  0237 11/12/21  1059   SODIUM 131* 131* 138   POTASSIUM 3.4* 3.6 3.7   CHLORIDE 96 93* 101   CO2 26 26 28   GLUCOSE 118* 100* 148*   BUN 21 19 13   CREATININE 0.72 0.73 0.67   CALCIUM 8.8 9.0 9.0                   Imaging  CT-CTA CHEST PULMONARY ARTERY W/ RECONS   Final Result         1. No CT evidence of pulmonary embolism.      2. Extensive groundglass and airspace opacity throughout both lungs, in keeping with Covid 19 pneumonia.         DX-CHEST-LIMITED (1 VIEW)   Final Result         1.  There is some decrease in opacification laterally in the left lung compared to the prior radiograph.      2.  Some persistent opacifications are noted which are most prominent in the right upper lobe consistent with pneumonia.      3.  No new infiltrates or consolidations are identified.      US-EXTREMITY VENOUS LOWER BILAT   Final Result      DX-CHEST-PORTABLE (1 VIEW)   Final Result     "     1.  Patchy bilateral pulmonary infiltrates   2.  Atherosclerosis           Assessment/Plan  * Acute hypoxemic respiratory failure due to COVID-19 (HCC)  Assessment & Plan  Positive antigen 10/29, +PCR 11/2  Procal WNL  CRP 1.93>7.7  US doppler negative DVT b/l 10/31  CTA PE negative PE  Decadron 6mg x10 doses (completed)  S/p remdesivir x1 dose 11/2  Baricitinib started 11/3 and day 11/16/2021,   Dc lasix for continued soft pressures  Supportive managements: early mobilization, self prone, RT protocol, judicious fluid, IS  Wean off O2 as tolerated    Hyponatremia  Assessment & Plan  Sec to poor oral intake and covid, mild  Encourage oral intake  Boost  Cont monitoring    GERD (gastroesophageal reflux disease)  Assessment & Plan  pepcid    ACP (advance care planning)  Assessment & Plan  Plan of care discussed with patient -- optimize COVID treatment, wean off HFNC support, patient stated \"I will come out of this\" and requests to keep as full code status  FULL code status confirmed    Transaminitis  Assessment & Plan  Mild, likely due to COVID    Hypocalcemia  Assessment & Plan  resolved    Thrombocytopenia (HCC)  Assessment & Plan  resolved    Hypotension due to hypovolemia- (present on admission)  Assessment & Plan  Patient has had persistent hypotension although her MAP is greater than 65  Continue midodrine 10 mg 3 times daily, wean as able  A.m. cortisol was within normal range  Encourage oral intake/hydration       VTE prophylaxis: enoxaparin ppx    I have performed a physical exam and reviewed and updated ROS and Plan today (11/12/2021). In review of yesterday's note (11/11/2021), there are no changes except as documented above.  "

## 2021-11-12 NOTE — PROGRESS NOTES
Pt is improving with her o2 needs. Walking o2 are put in and we are waiting for o2 acceptance and delivery

## 2021-11-12 NOTE — RESPIRATORY CARE
REMOTE MONITORING PROGRAM by RESPIRATORY THERAPY  11/12/2021 at 12:48 PM by Maren Pierce   Reviewed by RT. Patient doesn't qualify for Remote Monitoring program due to her medical insurance. Notified STEPHANIE Castillo via [employer].  No questions at this time.

## 2021-11-12 NOTE — CARE PLAN
Problem: Respiratory  Goal: Patient will achieve/maintain optimum respiratory ventilation and gas exchange  Pt is down to 5 L NC and we are continuing to wean  Outcome: Progressing   The patient is Stable - Low risk of patient condition declining or worsening    Shift Goals  Clinical Goals: wean o2 and wlaking o2  Patient Goals: wean o2 and go home  Family Goals: ANTHONY    Progress made toward(s) clinical / shift goals:  progressing    Patient is not progressing towards the following goals:

## 2021-11-12 NOTE — FACE TO FACE
"Face to Face Note  -  Durable Medical Equipment    Addie Madrigal M.D. - NPI: [id number]  I certify that this patient is under my care and that they had a durable medical equipment(DME)face to face encounter by myself that meets the physician DME face-to-face encounter requirements with this patient on:    Date of encounter:   Patient:                    MRN:                       YOB: 2023  Vicenta Saede  [id number]  1955     The encounter with the patient was in whole, or in part, for the following medical condition, which is the primary reason for durable medical equipment:  Covid-19 Infection    I certify that, based on my findings, the following durable medical equipment is medically necessary:  Oxygen.    HOME O2 Saturation Measurements:(Values must be present for Home Oxygen orders)  Room air sat at rest: 84  Room air sat with amb: 80  With liters of O2: 6, O2 sat at rest with O2: 90  With Liters of O2: 6, O2 sat with amb with O2 : 89  Is the patient mobile?: Yes    My Clinical findings support the need for the above equipment due to:  Hypoxia    Supporting Symptoms: The patient requires supplemental oxygen, as the following interventions have been tried with limited or no improvement: \"Bronchodilators and/or steroid inhalers, \"Oral and/or IV steroids, \"Ambulation with oximetry and \"Incentive spirometry    If patient feels more short of breath, they can go up to 6 liters per minute and contact healthcare provider.  "

## 2021-11-12 NOTE — PROGRESS NOTES
Hospital Medicine Daily Progress Note    Date of Service  11/11/2021    Chief Complaint  Vicenta Saeed is a 66 y.o. female admitted 10/29/2021 with SOB    Hospital Course  Patient is a 66 year old female with no significant past medical history who presnted to the ER on 10/30/1 with dizziness, cough and fever. She has not been vaccinated and just returned from a long visit to [facility].  She developed flu like symptoms on 10/25/21 and outpatient covid testing was negative, however a test here was positive.    Her cxr was consistent with covid pneumonia.  Procalcitonin was low but d dimer elevated, she was found to have acute hypoxic respiratory failure with an oxygen saturation of 85% on RA.  Patient has been treated with Decadron and Remdesivir as well as midodrine for persistent hypotension.   She did intermittently require high flow but has since been titrated down to 15L mask. CTPE study was negative.   Hospital stay has been prolonged due to persistent hypoxia and high O2 needs.     Interval Problem Update  Patient seen at bedside, no new issues, still with High O2 needs, currently on 12L slightly improved from yesterday   - continue to wean as able  - received fluid bolus for soft pressures, would avoid fluids if MAP >65, will dc lasix and monitor   -Completed Decadron and remdesivir, continue baricitinib and date 11/16/2021    Consultants/Specialty  ID    Code Status  Full Code    Disposition  Patient is not medically cleared.   Anticipate discharge to to home with close outpatient follow-up.      Review of Systems  Review of Systems   Constitutional: Positive for malaise/fatigue. Negative for fever.        Poor appetite   HENT: Negative.  Negative for nosebleeds and sore throat.    Eyes: Negative.  Negative for blurred vision and double vision.   Respiratory: Positive for cough and shortness of breath. Negative for hemoptysis, sputum production and wheezing.    Cardiovascular: Negative.  Negative for  chest pain, palpitations and leg swelling.   Gastrointestinal: Negative for abdominal pain, heartburn, nausea and vomiting.   Genitourinary: Negative.  Negative for dysuria.   Musculoskeletal: Negative.  Negative for falls and myalgias.   Skin: Negative.  Negative for itching and rash.   Neurological: Negative.  Negative for dizziness, focal weakness, weakness and headaches.   Endo/Heme/Allergies: Negative.  Does not bruise/bleed easily.   Psychiatric/Behavioral: Negative for substance abuse. The patient is not nervous/anxious.    All other systems reviewed and are negative.       Physical Exam  Temp:  [36.3 °C (97.4 °F)-37.2 °C (99 °F)] 36.3 °C (97.4 °F)  Pulse:  [68-80] 71  Resp:  [18] 18  BP: ()/(57-63) 101/58  SpO2:  [94 %-97 %] 96 %    Physical Exam  Vitals and nursing note reviewed. Exam conducted with a chaperone present.   Constitutional:       Appearance: She is normal weight. She is ill-appearing.   HENT:      Head: Normocephalic.      Nose: Nose normal.      Mouth/Throat:      Mouth: Mucous membranes are moist.      Pharynx: Oropharynx is clear.   Eyes:      General: No scleral icterus.     Extraocular Movements: Extraocular movements intact.      Conjunctiva/sclera: Conjunctivae normal.   Cardiovascular:      Rate and Rhythm: Normal rate and regular rhythm.      Pulses: Normal pulses.      Heart sounds: Normal heart sounds.   Pulmonary:      Breath sounds: No stridor. No rhonchi or rales.      Comments: On 12 L mask, proning  Diminished breath sounds throughout  Abdominal:      General: There is no distension.      Palpations: Abdomen is soft.      Tenderness: There is no abdominal tenderness. There is no guarding or rebound.   Musculoskeletal:         General: No swelling or deformity. Normal range of motion.      Cervical back: Neck supple. No tenderness.   Skin:     General: Skin is warm and dry.      Capillary Refill: Capillary refill takes less than 2 seconds.   Neurological:      General: No  focal deficit present.      Mental Status: She is alert and oriented to person, place, and time.      Cranial Nerves: No cranial nerve deficit.   Psychiatric:         Mood and Affect: Mood normal.         Behavior: Behavior normal.         Fluids    Intake/Output Summary (Last 24 hours) at 11/11/2021 1630  Last data filed at 11/11/2021 0900  Gross per 24 hour   Intake 480 ml   Output --   Net 480 ml       Laboratory  Recent Labs     11/09/21  0353 11/10/21  0317 11/11/21  0237   WBC 13.9* 14.2* 11.2*   RBC 4.44 4.34 4.50   HEMOGLOBIN 13.4 12.7 13.6   HEMATOCRIT 38.8 38.4 39.8   MCV 87.4 88.5 88.4   MCH 30.2 29.3 30.2   MCHC 34.5 33.1* 34.2   RDW 40.5 41.5 40.7   PLATELETCT 322 345 344   MPV 10.2 10.2 10.1     Recent Labs     11/09/21  0353 11/10/21  0317 11/11/21  0237   SODIUM 132* 131* 131*   POTASSIUM 3.9 3.4* 3.6   CHLORIDE 96 96 93*   CO2 23 26 26   GLUCOSE 105* 118* 100*   BUN 27* 21 19   CREATININE 0.78 0.72 0.73   CALCIUM 9.1 8.8 9.0                   Imaging  CT-CTA CHEST PULMONARY ARTERY W/ RECONS   Final Result         1. No CT evidence of pulmonary embolism.      2. Extensive groundglass and airspace opacity throughout both lungs, in keeping with Covid 19 pneumonia.         DX-CHEST-LIMITED (1 VIEW)   Final Result         1.  There is some decrease in opacification laterally in the left lung compared to the prior radiograph.      2.  Some persistent opacifications are noted which are most prominent in the right upper lobe consistent with pneumonia.      3.  No new infiltrates or consolidations are identified.      US-EXTREMITY VENOUS LOWER BILAT   Final Result      DX-CHEST-PORTABLE (1 VIEW)   Final Result         1.  Patchy bilateral pulmonary infiltrates   2.  Atherosclerosis           Assessment/Plan  * Acute hypoxemic respiratory failure due to COVID-19 (HCC)  Assessment & Plan  Positive antigen 10/29, +PCR 11/2  Procal WNL  CRP 1.93>7.7  US doppler negative DVT b/l 10/31  CTA PE negative  "PE  Decadron 6mg x10 doses (completed)  S/p remdesivir x1 dose 11/2  Baricitinib started 11/3 and day 11/16/2021,   Dc lasix for continued soft pressures  Supportive managements: early mobilization, self prone, RT protocol, judicious fluid, IS  Wean off O2 as tolerated    Hyponatremia  Assessment & Plan  Sec to poor oral intake and covid, mild  Encourage oral intake  Boost  Cont monitoring    GERD (gastroesophageal reflux disease)  Assessment & Plan  pepcid    ACP (advance care planning)  Assessment & Plan  Plan of care discussed with patient -- optimize COVID treatment, wean off HFNC support, patient stated \"I will come out of this\" and requests to keep as full code status  FULL code status confirmed    Transaminitis  Assessment & Plan  Mild, likely due to COVID    Hypocalcemia  Assessment & Plan  resolved    Thrombocytopenia (HCC)  Assessment & Plan  resolved    Hypotension due to hypovolemia- (present on admission)  Assessment & Plan  Avoid excess fluid due to COVID-19  Encourage oral intake/hydration  Continue midodrine 10 mg 3 times daily, wean as able       VTE prophylaxis: enoxaparin ppx    I have performed a physical exam and reviewed and updated ROS and Plan today (11/11/2021). In review of yesterday's note (11/10/2021), there are no changes except as documented above.  "

## 2021-11-13 ENCOUNTER — PATIENT OUTREACH (OUTPATIENT)
Dept: HEALTH INFORMATION MANAGEMENT | Facility: OTHER | Age: 66
End: 2021-11-13

## 2021-11-13 VITALS
SYSTOLIC BLOOD PRESSURE: 93 MMHG | WEIGHT: 141.98 LBS | OXYGEN SATURATION: 98 % | HEIGHT: 62 IN | DIASTOLIC BLOOD PRESSURE: 57 MMHG | TEMPERATURE: 97.4 F | RESPIRATION RATE: 20 BRPM | BODY MASS INDEX: 26.13 KG/M2 | HEART RATE: 63 BPM

## 2021-11-13 PROBLEM — D69.6 THROMBOCYTOPENIA (HCC): Status: RESOLVED | Noted: 2021-10-31 | Resolved: 2021-11-13

## 2021-11-13 PROBLEM — E83.51 HYPOCALCEMIA: Status: RESOLVED | Noted: 2021-11-01 | Resolved: 2021-11-13

## 2021-11-13 LAB
ALBUMIN SERPL BCP-MCNC: 3.5 G/DL (ref 3.2–4.9)
ALBUMIN/GLOB SERPL: 1 G/DL
ALP SERPL-CCNC: 70 U/L (ref 30–99)
ALT SERPL-CCNC: 67 U/L (ref 2–50)
ANION GAP SERPL CALC-SCNC: 8 MMOL/L (ref 7–16)
AST SERPL-CCNC: 43 U/L (ref 12–45)
BASOPHILS # BLD AUTO: 0.7 % (ref 0–1.8)
BASOPHILS # BLD: 0.06 K/UL (ref 0–0.12)
BILIRUB SERPL-MCNC: 0.5 MG/DL (ref 0.1–1.5)
BUN SERPL-MCNC: 12 MG/DL (ref 8–22)
CALCIUM SERPL-MCNC: 8.8 MG/DL (ref 8.5–10.5)
CHLORIDE SERPL-SCNC: 97 MMOL/L (ref 96–112)
CO2 SERPL-SCNC: 29 MMOL/L (ref 20–33)
CREAT SERPL-MCNC: 0.71 MG/DL (ref 0.5–1.4)
EOSINOPHIL # BLD AUTO: 0.32 K/UL (ref 0–0.51)
EOSINOPHIL NFR BLD: 3.6 % (ref 0–6.9)
ERYTHROCYTE [DISTWIDTH] IN BLOOD BY AUTOMATED COUNT: 41.3 FL (ref 35.9–50)
GLOBULIN SER CALC-MCNC: 3.5 G/DL (ref 1.9–3.5)
GLUCOSE SERPL-MCNC: 98 MG/DL (ref 65–99)
HCT VFR BLD AUTO: 36.9 % (ref 37–47)
HGB BLD-MCNC: 12.4 G/DL (ref 12–16)
IMM GRANULOCYTES # BLD AUTO: 0.19 K/UL (ref 0–0.11)
IMM GRANULOCYTES NFR BLD AUTO: 2.2 % (ref 0–0.9)
LYMPHOCYTES # BLD AUTO: 2.87 K/UL (ref 1–4.8)
LYMPHOCYTES NFR BLD: 32.5 % (ref 22–41)
MCH RBC QN AUTO: 30 PG (ref 27–33)
MCHC RBC AUTO-ENTMCNC: 33.6 G/DL (ref 33.6–35)
MCV RBC AUTO: 89.3 FL (ref 81.4–97.8)
MONOCYTES # BLD AUTO: 0.47 K/UL (ref 0–0.85)
MONOCYTES NFR BLD AUTO: 5.3 % (ref 0–13.4)
NEUTROPHILS # BLD AUTO: 4.91 K/UL (ref 2–7.15)
NEUTROPHILS NFR BLD: 55.7 % (ref 44–72)
NRBC # BLD AUTO: 0 K/UL
NRBC BLD-RTO: 0 /100 WBC
PLATELET # BLD AUTO: 373 K/UL (ref 164–446)
PMV BLD AUTO: 10.2 FL (ref 9–12.9)
POTASSIUM SERPL-SCNC: 3.9 MMOL/L (ref 3.6–5.5)
PROT SERPL-MCNC: 7 G/DL (ref 6–8.2)
RBC # BLD AUTO: 4.13 M/UL (ref 4.2–5.4)
SODIUM SERPL-SCNC: 134 MMOL/L (ref 135–145)
WBC # BLD AUTO: 8.8 K/UL (ref 4.8–10.8)

## 2021-11-13 PROCEDURE — 80053 COMPREHEN METABOLIC PANEL: CPT

## 2021-11-13 PROCEDURE — 700102 HCHG RX REV CODE 250 W/ 637 OVERRIDE(OP): Performed by: STUDENT IN AN ORGANIZED HEALTH CARE EDUCATION/TRAINING PROGRAM

## 2021-11-13 PROCEDURE — 99239 HOSP IP/OBS DSCHRG MGMT >30: CPT | Performed by: STUDENT IN AN ORGANIZED HEALTH CARE EDUCATION/TRAINING PROGRAM

## 2021-11-13 PROCEDURE — 700111 HCHG RX REV CODE 636 W/ 250 OVERRIDE (IP): Performed by: STUDENT IN AN ORGANIZED HEALTH CARE EDUCATION/TRAINING PROGRAM

## 2021-11-13 PROCEDURE — 85025 COMPLETE CBC W/AUTO DIFF WBC: CPT

## 2021-11-13 PROCEDURE — A9270 NON-COVERED ITEM OR SERVICE: HCPCS | Performed by: STUDENT IN AN ORGANIZED HEALTH CARE EDUCATION/TRAINING PROGRAM

## 2021-11-13 PROCEDURE — 700102 HCHG RX REV CODE 250 W/ 637 OVERRIDE(OP): Performed by: HOSPITALIST

## 2021-11-13 PROCEDURE — A9270 NON-COVERED ITEM OR SERVICE: HCPCS | Performed by: HOSPITALIST

## 2021-11-13 PROCEDURE — 36415 COLL VENOUS BLD VENIPUNCTURE: CPT

## 2021-11-13 RX ORDER — MIDODRINE HYDROCHLORIDE 10 MG/1
TABLET ORAL
Qty: 27 TABLET | Refills: 0 | Status: SHIPPED | OUTPATIENT
Start: 2021-11-13 | End: 2021-11-28

## 2021-11-13 RX ORDER — FAMOTIDINE 20 MG/1
20 TABLET, FILM COATED ORAL 2 TIMES DAILY
Qty: 28 TABLET | Refills: 0 | Status: SHIPPED | OUTPATIENT
Start: 2021-11-13 | End: 2021-11-27

## 2021-11-13 RX ORDER — GUAIFENESIN/DEXTROMETHORPHAN 100-10MG/5
5 SYRUP ORAL EVERY 6 HOURS PRN
Qty: 840 ML | Refills: 0 | Status: SHIPPED | OUTPATIENT
Start: 2021-11-13 | End: 2021-12-14

## 2021-11-13 RX ORDER — BENZONATATE 100 MG/1
100 CAPSULE ORAL 3 TIMES DAILY PRN
Qty: 60 CAPSULE | Refills: 0 | Status: SHIPPED | OUTPATIENT
Start: 2021-11-13 | End: 2021-12-14

## 2021-11-13 RX ORDER — ACETAMINOPHEN 500 MG
500 TABLET ORAL EVERY 6 HOURS PRN
COMMUNITY
Start: 2021-11-13 | End: 2021-12-27

## 2021-11-13 RX ADMIN — MIDODRINE HYDROCHLORIDE 10 MG: 5 TABLET ORAL at 12:07

## 2021-11-13 RX ADMIN — MIDODRINE HYDROCHLORIDE 10 MG: 5 TABLET ORAL at 08:28

## 2021-11-13 RX ADMIN — ENOXAPARIN SODIUM 40 MG: 40 INJECTION SUBCUTANEOUS at 04:32

## 2021-11-13 RX ADMIN — FAMOTIDINE 20 MG: 20 TABLET ORAL at 04:32

## 2021-11-13 RX ADMIN — Medication 1000 UNITS: at 08:28

## 2021-11-13 RX ADMIN — DOCUSATE SODIUM 50 MG AND SENNOSIDES 8.6 MG 2 TABLET: 8.6; 5 TABLET, FILM COATED ORAL at 04:32

## 2021-11-13 NOTE — CARE PLAN
The patient is Stable - Low risk of patient condition declining or worsening    Shift Goals  Clinical Goals: (P) maintain adequate oxygenation; get home oxygen  Patient Goals: (P) go home  Family Goals: ANTHONY    Progress made toward(s) clinical / shift goals:    Problem: Respiratory  Goal: Patient will achieve/maintain optimum respiratory ventilation and gas exchange  Outcome: Progressing  Note: Patient mainating o2 sats 94-98% on 3L via oxymask.

## 2021-11-13 NOTE — DISCHARGE SUMMARY
Discharge Summary    CHIEF COMPLAINT ON ADMISSION  Chief Complaint   Patient presents with   • Flu Like Symptoms     onset 10/25/2021 - weakness, dizziness, cough, fever       Reason for Admission  Sent by MD     Admission Date  10/29/2021    CODE STATUS  Prior    HPI & HOSPITAL COURSE  This is a 66 y.o. female with no significant past medical history who presented to the hospital on 10/30/2021 with complaints of dizziness, cough and fever.  Patient developed flulike symptoms on 10/25/2021 and had had progressive shortness of breath.  On arrival here she was COVID-19 positive she is unvaccinated.  Her chest x-ray was consistent with COVID-19 pneumonia.  Procalcitonin was low so she was now started on antibiotics.  She was hypoxic 85% on room air and was started on Decadron admitted for further evaluation.  During her admission patient increasing oxygen needs and was ultimately placed on a 15 L oxygen mask.  She was started on remdesivir as well as midodrine for persistent hypotension.  She did undergo CT PE study which was negative for pulmonary embolism.  Her hospitalization was prolonged due to persistent hypoxia and high oxygen needs.  But ultimately were we were able to titrate her down to 5 to 6 L of supplemental oxygen with ambulation though she was discharged home with home oxygen.  She was educated on signs symptoms that should prompt her to seek medical attention.  She was also discharged on midodrine taper.  She was advised to follow-up with her primary care physician for continued blood pressure monitoring and oxygen needs.  Patient was hemodynamically stable and eager for discharge.    Therefore, she is discharged in fair and stable condition to home with close outpatient follow-up.    The patient met 2-midnight criteria for an inpatient stay at the time of discharge.    Discharge Date  11/13/2021    FOLLOW UP ITEMS POST DISCHARGE  Blood pressure and tapering of midodrine  Respiratory status and tapering  of home oxygen    DISCHARGE DIAGNOSES  Principal Problem:    Acute hypoxemic respiratory failure due to COVID-19 (HCC) POA: Unknown  Active Problems:    Hypotension due to hypovolemia POA: Yes    Transaminitis POA: Unknown    ACP (advance care planning) POA: Unknown    GERD (gastroesophageal reflux disease) POA: Unknown    Hyponatremia POA: Unknown  Resolved Problems:    Thrombocytopenia (HCC) POA: Unknown    Hypocalcemia POA: Unknown      FOLLOW UP  No future appointments.        follow up with PCP in the next 1-2 weeks     [facility]  [address] [facility] [id number] [phone]  Call  Please call [facility] to establish with a Primary Care Provider. Thank you.    Pcp Pt States None            MEDICATIONS ON DISCHARGE     Medication List      START taking these medications      Instructions   acetaminophen 500 MG Tabs  Commonly known as: TYLENOL   Take 1 Tablet by mouth every 6 hours as needed for Mild Pain or Moderate Pain.  Dose: 500 mg     benzonatate 100 MG Caps  Commonly known as: TESSALON   Take 1 Capsule by mouth 3 times a day as needed for Cough.  Dose: 100 mg     famotidine 20 MG Tabs  Commonly known as: PEPCID   Take 1 Tablet by mouth 2 times a day for 14 days.  Dose: 20 mg     guaiFENesin dextromethorphan 100-10 MG/5ML Syrp syrup  Commonly known as: ROBITUSSIN DM   Take 5 mL by mouth every 6 hours as needed for Cough.  Dose: 5 mL     midodrine 10 MG tablet  Start taking on: November 13, 2021  Commonly known as: PROAMATINE   Take 1 Tablet by mouth 3 times a day with meals for 5 days, THEN 0.5 Tablets 3 times a day with meals for 5 days, THEN 0.5 Tablets 2 times a day for 3 days, THEN 0.5 Tablets every day for 2 days.     vitamin D 1000 UNIT Tabs  Commonly known as: VITAMIND D3   Take 1 Tablet by mouth every day.  Dose: 1,000 Units        CONTINUE taking these medications      Instructions   WOMENS MULTIVITAMIN PO   Take 1 Tablet by mouth every  day.  Dose: 1 Tablet            Allergies  No Known Allergies    DIET  No orders of the defined types were placed in this encounter.      ACTIVITY  As tolerated.      CONSULTATIONS  ID for remdesivir    PROCEDURES  N/A    LABORATORY  Lab Results   Component Value Date    SODIUM 134 (L) 11/13/2021    POTASSIUM 3.9 11/13/2021    CHLORIDE 97 11/13/2021    CO2 29 11/13/2021    GLUCOSE 98 11/13/2021    BUN 12 11/13/2021    CREATININE 0.71 11/13/2021        Lab Results   Component Value Date    WBC 8.8 11/13/2021    HEMOGLOBIN 12.4 11/13/2021    HEMATOCRIT 36.9 (L) 11/13/2021    PLATELETCT 373 11/13/2021        Total time of the discharge process exceeds 36 minutes.

## 2021-11-13 NOTE — DISCHARGE PLANNING
Agency/Facility Name: [facility]  Spoke To: Gabby  Outcome: Patient approved. Will have portables and concentrator delivered to bedside.

## 2021-11-13 NOTE — PROGRESS NOTES
Pt has been discharged with family and preferred o2 oxygen at bedside. Pt has no further questions comments or concerns. Room was stripped and no belongings have been left behind.

## 2021-11-13 NOTE — DISCHARGE INSTRUCTIONS
Discharge Instructions    Discharged to home by car with relative. Discharged via wheelchair, hospital escort: Yes.  Special equipment needed: Oxygen    Be sure to schedule a follow-up appointment with your primary care doctor or any specialists as instructed.     Discharge Plan:   Diet Plan: (P) Discussed  Activity Level: (P) Discussed  Confirmed Follow up Appointment: (P) Patient to Call and Schedule Appointment  Confirmed Symptoms Management: (P) Discussed  Medication Reconciliation Updated: (P) Yes  Influenza Vaccine Indication: (P) Patient Refuses    I understand that a diet low in cholesterol, fat, and sodium is recommended for good health. Unless I have been given specific instructions below for another diet, I accept this instruction as my diet prescription.   Other diet: Regular    Special Instructions: None    · Is patient discharged on Warfarin / Coumadin?   No     Depression / Suicide Risk    As you are discharged from this [facility] facility, it is important to learn how to keep safe from harming yourself.    Recognize the warning signs:  · Abrupt changes in personality, positive or negative- including increase in energy   · Giving away possessions  · Change in eating patterns- significant weight changes-  positive or negative  · Change in sleeping patterns- unable to sleep or sleeping all the time   · Unwillingness or inability to communicate  · Depression  · Unusual sadness, discouragement and loneliness  · Talk of wanting to die  · Neglect of personal appearance   · Rebelliousness- reckless behavior  · Withdrawal from people/activities they love  · Confusion- inability to concentrate     If you or a loved one observes any of these behaviors or has concerns about self-harm, here's what you can do:  · Talk about it- your feelings and reasons for harming yourself  · Remove any means that you might use to hurt yourself (examples: pills, rope, extension cords, firearm)  · Get professional help from  the community (Mental Health, Substance Abuse, psychological counseling)  · Do not be alone:Call your Safe Contact- someone whom you trust who will be there for you.  · Call your local CRISIS HOTLINE 686-1948 or 422-097-1715  · Call your local Children's Mobile Crisis Response Team [place] (178) 345-3964 or www.Exclusively.in  · Call the toll free National Suicide Prevention Hotlines   · National Suicide Prevention Lifeline 266-804-ZDOA (8406)  · National Hope Line Network 800-SUICIDE (046-7014)      Take medications as directed, will work on tapering your midodrine down, follow the provided instructions   Recommend you follow up with your primary care physician in the next 1-2 weeks so they can follow your blood pressure and your oxygen needs   Goal Oxygen is greater than 90%, taper your oxygen levels to meet this goal   If you develop increasing shortness of breath, chest pain, dizziness or other worriessome symptoms     Home Oxygen Use, Adult    When a medical condition keeps you from getting enough oxygen, your health care provider may instruct you to take extra oxygen at home. Your health care provider will let you know:  · When to take oxygen.  · For how long to take oxygen.  · How quickly oxygen should be delivered (flow rate), in liters per minute (LPM or L/M).  Home oxygen can be given through:  · A mask.  · A nasal cannula. This is a device or tube that goes in the nostrils.  · A transtracheal catheter. This is a small, flexible tube placed in the trachea.  · A tracheostomy. This is a surgically made opening in the trachea.  These devices are connected with tubing to an oxygen source, such as:  · A tank. Tanks hold oxygen in gas form. They must be replaced when the oxygen is used up.  · A liquid oxygen device. This holds oxygen in liquid form. It must be replaced when the oxygen is used up.  · An oxygen concentrator machine. This filters oxygen in the room. It uses electricity, so you must have a  backup cylinder of oxygen in case the power goes out.  Supplies needed:  To use oxygen, you will need:  · A mask, nasal cannula, transtracheal catheter, or tracheostomy.  · An oxygen tank, a liquid oxygen device, or an oxygen concentrator.  · The tape that your health care provider recommends (optional).  If you use a transtracheal catheter and your prescribed flow rate is 1 LPM or greater, you will also need a humidifier.  Risks and complications  · Fire. This can happen if the oxygen is exposed to a heat source, flame, or spark.  · Injury to skin. This can happen if liquid oxygen touches your skin.  · Organ damage. This can happen if you get too little oxygen.  How to use oxygen    Your health care provider or a representative from your medical device company will show you how to use your oxygen device. Follow her or his instructions. The instructions may look something like this:  1. Wash your hands.  2. If you use an oxygen concentrator, make sure it is plugged in.  3. Place one end of the tube into the port on the tank, device, or machine.  4. Place the mask over your nose and mouth. Or, place the nasal cannula and secure it with tape if instructed. If you use a tracheostomy or transtracheal catheter, connect it to the oxygen source as directed.  5. Make sure the liter-flow setting on the machine is at the level prescribed by your health care provider.  6. Turn on the machine or adjust the knob on the tank or device to the correct liter-flow setting.  7. When you are done, turn off and unplug the machine, or turn the knob to OFF.  How to clean and care for the oxygen supplies  Nasal cannula  · Clean it with a warm, wet cloth daily or as needed.  · Wash it with a liquid soap once a week.  · Rinse it thoroughly once or twice a week.  · Replace it every 2-4 weeks.  · If you have an infection, such as a cold or pneumonia, change the cannula when you get better.  Mask  · Replace it every 2-4 weeks.  · If you have an  "infection, such as a cold or pneumonia, change the mask when you get better.  Humidifier bottle  · Wash the bottle between each refill:  ? Wash it with soap and warm water.  ? Rinse it thoroughly.  ? Disinfect it and its top.  ? Air-dry it.  · Make sure it is dry before you refill it.  Oxygen concentrator  · Clean the air filter at least twice a week according to directions from your home medical equipment and service company.  · Wipe down the cabinet every day. To do this:  ? Unplug the unit.  ? Wipe down the cabinet with a damp cloth.  ? Dry the cabinet.  Other equipment  · Change any extra tubing every 1-3 months.  · Follow instructions from your health care provider about taking care of any other equipment.  Safety tips  Fire safety tips       · Keep your oxygen and oxygen supplies at least 5 ft away from sources of heat, flames, and harris at all times.  · Do not allow smoking near your oxygen. Put up \"no smoking\" signs in your home. Avoid smoking areas when in public.  · Do not use materials that can burn (are flammable) while you use oxygen.  · When you go to a restaurant with portable oxygen, ask to be seated in the nonsmoking section.  · Keep a fire extinguisher close by. Let your fire department know that you have oxygen in your home.  · Test your home smoke detectors regularly.  Traveling  · Secure your oxygen tank in the vehicle so that it does not move around. Follow instructions from your medical device company about how to safely secure your tank.  · Make sure you have enough oxygen for the amount of time you will be away from home.  · If you are planning air travel, contact the airline to find out if they allow the use of an approved portable oxygen concentrator. You may also need documents from your health care provider and medical device company before you travel.  General safety tips  · If you use an oxygen cylinder, make sure it is in a stand or secured to an object that will not move (fixed " object).  · If you use liquid oxygen, make sure its container is kept upright.  · If you use an oxygen concentrator:  ? Tell your electric company. Make sure you are given priority service in the event that your power goes out.  ? Avoid using extension cords, if possible.  Follow these instructions at home:  · Use oxygen only as told by your health care provider.  · Do not use alcohol or other drugs that make you relax (sedating drugs) unless instructed. They can slow down your breathing rate and make it hard to get in enough oxygen.  · Know how and when to order a refill of oxygen.  · Always keep a spare tank of oxygen. Plan ahead for holidays when you may not be able to get a prescription filled.  · Use water-based lubricants on your lips or nostrils. Do not use oil-based products like petroleum jelly.  · To prevent skin irritation on your cheeks or behind your ears, tuck some gauze under the tubing.  Contact a health care provider if:  · You get headaches often.  · You have shortness of breath.  · You have a lasting cough.  · You have anxiety.  · You are sleepy all the time.  · You develop an illness that affects your breathing.  · You cannot exercise at your regular level.  · You are restless.  · You have difficult or irregular breathing, and it is getting worse.  · You have a fever.  · You have persistent redness under your nose.  Get help right away if:  · You are confused.  · You have blue lips or fingernails.  · You are struggling to breathe.  Summary  · Your health care provider or a representative from your medical device company will show you how to use your oxygen device. Follow her or his instructions.  · If you use an oxygen concentrator, make sure it is plugged in.  · Make sure the liter-flow setting on the machine is at the level prescribed by your health care provider.  · Keep your oxygen and oxygen supplies at least 5 ft away from sources of heat, flames, and harris at all times.  This information  is not intended to replace advice given to you by your health care provider. Make sure you discuss any questions you have with your health care provider.  Document Released: 03/09/2005 Document Revised: 06/06/2019 Document Reviewed: 07/11/2017  [employer] Patient Education © 2020 [employer]    COVID-19  COVID-19 is a respiratory infection that is caused by a virus called severe acute respiratory syndrome coronavirus 2 (SARS-CoV-2). The disease is also known as coronavirus disease or novel coronavirus. In some people, the virus may not cause any symptoms. In others, it may cause a serious infection. The infection can get worse quickly and can lead to complications, such as:  · Pneumonia, or infection of the lungs.  · Acute respiratory distress syndrome or ARDS. This is fluid build-up in the lungs.  · Acute respiratory failure. This is a condition in which there is not enough oxygen passing from the lungs to the body.  · Sepsis or septic shock. This is a serious bodily reaction to an infection.  · Blood clotting problems.  · Secondary infections due to bacteria or fungus.  The virus that causes COVID-19 is contagious. This means that it can spread from person to person through droplets from coughs and sneezes (respiratory secretions).  What are the causes?  This illness is caused by a virus. You may catch the virus by:  · Breathing in droplets from an infected person's cough or sneeze.  · Touching something, like a table or a doorknob, that was exposed to the virus (contaminated) and then touching your mouth, nose, or eyes.  What increases the risk?  Risk for infection  You are more likely to be infected with this virus if you:  · Live in or travel to an area with a COVID-19 outbreak.  · Come in contact with a sick person who recently traveled to an area with a COVID-19 outbreak.  · Provide care for or live with a person who is infected with COVID-19.  Risk for serious illness  You are more likely to become seriously  ill from the virus if you:  · Are 65 years of age or older.  · Have a long-term disease that lowers your body's ability to fight infection (immunocompromised).  · Live in a nursing home or long-term care facility.  · Have a long-term (chronic) disease such as:  ? Chronic lung disease, including chronic obstructive pulmonary disease or asthma  ? Heart disease.  ? Diabetes.  ? Chronic kidney disease.  ? Liver disease.  · Are obese.  What are the signs or symptoms?  Symptoms of this condition can range from mild to severe. Symptoms may appear any time from 2 to 14 days after being exposed to the virus. They include:  · A fever.  · A cough.  · Difficulty breathing.  · Chills.  · Muscle pains.  · A sore throat.  · Loss of taste or smell.  Some people may also have stomach problems, such as nausea, vomiting, or diarrhea.  Other people may not have any symptoms of COVID-19.  How is this diagnosed?  This condition may be diagnosed based on:  · Your signs and symptoms, especially if:  ? You live in an area with a COVID-19 outbreak.  ? You recently traveled to or from an area where the virus is common.  ? You provide care for or live with a person who was diagnosed with COVID-19.  · A physical exam.  · Lab tests, which may include:  ? A nasal swab to take a sample of fluid from your nose.  ? A throat swab to take a sample of fluid from your throat.  ? A sample of mucus from your lungs (sputum).  ? Blood tests.  · Imaging tests, which may include, X-rays, CT scan, or ultrasound.  How is this treated?  At present, there is no medicine to treat COVID-19. Medicines that treat other diseases are being used on a trial basis to see if they are effective against COVID-19.  Your health care provider will talk with you about ways to treat your symptoms. For most people, the infection is mild and can be managed at home with rest, fluids, and over-the-counter medicines.  Treatment for a serious infection usually takes places in a  hospital intensive care unit (ICU). It may include one or more of the following treatments. These treatments are given until your symptoms improve.  · Receiving fluids and medicines through an IV.  · Supplemental oxygen. Extra oxygen is given through a tube in the nose, a face mask, or a webber.  · Positioning you to lie on your stomach (prone position). This makes it easier for oxygen to get into the lungs.  · Continuous positive airway pressure (CPAP) or bi-level positive airway pressure (BPAP) machine. This treatment uses mild air pressure to keep the airways open. A tube that is connected to a motor delivers oxygen to the body.  · Ventilator. This treatment moves air into and out of the lungs by using a tube that is placed in your windpipe.  · Tracheostomy. This is a procedure to create a hole in the neck so that a breathing tube can be inserted.  · Extracorporeal membrane oxygenation (ECMO). This procedure gives the lungs a chance to recover by taking over the functions of the heart and lungs. It supplies oxygen to the body and removes carbon dioxide.  Follow these instructions at home:  Lifestyle  · If you are sick, stay home except to get medical care. Your health care provider will tell you how long to stay home. Call your health care provider before you go for medical care.  · Rest at home as told by your health care provider.  · Do not use any products that contain nicotine or tobacco, such as cigarettes, e-cigarettes, and chewing tobacco. If you need help quitting, ask your health care provider.  · Return to your normal activities as told by your health care provider. Ask your health care provider what activities are safe for you.  General instructions  · Take over-the-counter and prescription medicines only as told by your health care provider.  · Drink enough fluid to keep your urine pale yellow.  · Keep all follow-up visits as told by your health care provider. This is important.  How is this  prevented?    There is no vaccine to help prevent COVID-19 infection. However, there are steps you can take to protect yourself and others from this virus.  To protect yourself:   · Do not travel to areas where COVID-19 is a risk. The areas where COVID-19 is reported change often. To identify high-risk areas and travel restrictions, check the [facility] travel website: wwwnc.cdc.gov/travel/notices  · If you live in, or must travel to, an area where COVID-19 is a risk, take precautions to avoid infection.  ? Stay away from people who are sick.  ? Wash your hands often with soap and water for 20 seconds. If soap and water are not available, use an alcohol-based hand .  ? Avoid touching your mouth, face, eyes, or nose.  ? Avoid going out in public, follow guidance from your state and local health authorities.  ? If you must go out in public, wear a cloth face covering or face mask.  ? Disinfect objects and surfaces that are frequently touched every day. This may include:  § Counters and tables.  § Doorknobs and light switches.  § Sinks and faucets.  § Electronics, such as phones, remote controls, keyboards, computers, and tablets.  To protect others:  If you have symptoms of COVID-19, take steps to prevent the virus from spreading to others.  · If you think you have a COVID-19 infection, contact your health care provider right away. Tell your health care team that you think you may have a COVID-19 infection.  · Stay home. Leave your house only to seek medical care. Do not use public transport.  · Do not travel while you are sick.  · Wash your hands often with soap and water for 20 seconds. If soap and water are not available, use alcohol-based hand .  · Stay away from other members of your household. Let healthy household members care for children and pets, if possible. If you have to care for children or pets, wash your hands often and wear a mask. If possible, stay in your own room, separate from others.  Use a different bathroom.  · Make sure that all people in your household wash their hands well and often.  · Cough or sneeze into a tissue or your sleeve or elbow. Do not cough or sneeze into your hand or into the air.  · Wear a cloth face covering or face mask.  Where to find more information  · Centers for Disease Control and Prevention: www.cdc.gov/coronavirus/2019-ncov/index.html  · World Health Organization: www.who.int/health-topics/coronavirus  Contact a health care provider if:  · You live in or have traveled to an area where COVID-19 is a risk and you have symptoms of the infection.  · You have had contact with someone who has COVID-19 and you have symptoms of the infection.  Get help right away if:  · You have trouble breathing.  · You have pain or pressure in your chest.  · You have confusion.  · You have bluish lips and fingernails.  · You have difficulty waking from sleep.  · You have symptoms that get worse.  These symptoms may represent a serious problem that is an emergency. Do not wait to see if the symptoms will go away. Get medical help right away. Call your local emergency services (911 in the [place] Do not drive yourself to the hospital. Let the emergency medical personnel know if you think you have COVID-19.  Summary  · COVID-19 is a respiratory infection that is caused by a virus. It is also known as coronavirus disease or novel coronavirus. It can cause serious infections, such as pneumonia, acute respiratory distress syndrome, acute respiratory failure, or sepsis.  · The virus that causes COVID-19 is contagious. This means that it can spread from person to person through droplets from coughs and sneezes.  · You are more likely to develop a serious illness if you are 65 years of age or older, have a weak immunity, live in a nursing home, or have chronic disease.  · There is no medicine to treat COVID-19. Your health care provider will talk with you about ways to treat your symptoms.  · Take  steps to protect yourself and others from infection. Wash your hands often and disinfect objects and surfaces that are frequently touched every day. Stay away from people who are sick and wear a mask if you are sick.  This information is not intended to replace advice given to you by your health care provider. Make sure you discuss any questions you have with your health care provider.  Document Released: 01/23/2020 Document Revised: 05/14/2020 Document Reviewed: 01/23/2020  [employer] Patient Education © 2020 [employer]

## 2021-12-14 ENCOUNTER — OFFICE VISIT (OUTPATIENT)
Dept: URGENT CARE | Facility: CLINIC | Age: 66
End: 2021-12-14
Payer: COMMERCIAL

## 2021-12-14 ENCOUNTER — APPOINTMENT (OUTPATIENT)
Dept: RADIOLOGY | Facility: IMAGING CENTER | Age: 66
End: 2021-12-14
Attending: PHYSICIAN ASSISTANT
Payer: COMMERCIAL

## 2021-12-14 VITALS
BODY MASS INDEX: 25.86 KG/M2 | TEMPERATURE: 97.5 F | SYSTOLIC BLOOD PRESSURE: 120 MMHG | HEART RATE: 94 BPM | HEIGHT: 61 IN | RESPIRATION RATE: 18 BRPM | DIASTOLIC BLOOD PRESSURE: 80 MMHG | OXYGEN SATURATION: 94 % | WEIGHT: 137 LBS

## 2021-12-14 DIAGNOSIS — Z86.16 HISTORY OF COVID-19: ICD-10-CM

## 2021-12-14 DIAGNOSIS — R05.3 CHRONIC COUGH: ICD-10-CM

## 2021-12-14 DIAGNOSIS — J18.9 PNEUMONIA OF BOTH LOWER LOBES DUE TO INFECTIOUS ORGANISM: ICD-10-CM

## 2021-12-14 PROCEDURE — 71046 X-RAY EXAM CHEST 2 VIEWS: CPT | Mod: TC | Performed by: PHYSICIAN ASSISTANT

## 2021-12-14 PROCEDURE — 99214 OFFICE O/P EST MOD 30 MIN: CPT | Performed by: PHYSICIAN ASSISTANT

## 2021-12-14 RX ORDER — DOXYCYCLINE HYCLATE 100 MG
100 TABLET ORAL 2 TIMES DAILY
Qty: 20 TABLET | Refills: 0 | Status: SHIPPED | OUTPATIENT
Start: 2021-12-14 | End: 2021-12-24

## 2021-12-14 RX ORDER — ALBUTEROL SULFATE 90 UG/1
1 AEROSOL, METERED RESPIRATORY (INHALATION) EVERY 6 HOURS PRN
Qty: 8.5 G | Refills: 0 | Status: SHIPPED | OUTPATIENT
Start: 2021-12-14 | End: 2022-03-08 | Stop reason: SDUPTHER

## 2021-12-14 RX ORDER — ALBUTEROL SULFATE 90 UG/1
1 AEROSOL, METERED RESPIRATORY (INHALATION) EVERY 6 HOURS PRN
Qty: 8.5 G | Refills: 0 | Status: SHIPPED | OUTPATIENT
Start: 2021-12-14 | End: 2021-12-14 | Stop reason: SDUPTHER

## 2021-12-14 RX ORDER — DOXYCYCLINE HYCLATE 100 MG
100 TABLET ORAL 2 TIMES DAILY
Qty: 20 TABLET | Refills: 0 | Status: SHIPPED | OUTPATIENT
Start: 2021-12-14 | End: 2021-12-14 | Stop reason: SDUPTHER

## 2021-12-14 RX ORDER — BENZONATATE 200 MG/1
200 CAPSULE ORAL 3 TIMES DAILY PRN
Qty: 30 CAPSULE | Refills: 0 | Status: SHIPPED | OUTPATIENT
Start: 2021-12-14 | End: 2022-01-19

## 2021-12-14 RX ORDER — BENZONATATE 200 MG/1
200 CAPSULE ORAL 3 TIMES DAILY PRN
Qty: 30 CAPSULE | Refills: 0 | Status: SHIPPED | OUTPATIENT
Start: 2021-12-14 | End: 2021-12-14 | Stop reason: SDUPTHER

## 2021-12-14 ASSESSMENT — FIBROSIS 4 INDEX: FIB4 SCORE: 0.96

## 2021-12-15 ASSESSMENT — ENCOUNTER SYMPTOMS
NAUSEA: 0
DIARRHEA: 0
PALPITATIONS: 0
COUGH: 1
WHEEZING: 0
HEADACHES: 0
FEVER: 0
SPUTUM PRODUCTION: 0
VOMITING: 0
DIZZINESS: 0
ABDOMINAL PAIN: 0
CHILLS: 0
HEMOPTYSIS: 0
SHORTNESS OF BREATH: 1
MYALGIAS: 0

## 2021-12-15 NOTE — PROGRESS NOTES
"Miles Saeed is a 66 y.o. female who presents with Cough (after leaving the hospital for covid treatment, SOB, )            The patient is here accompanied by her daughter. The patient recently had COVID and was discharged from the hospital after a 3 week stay around 5 weeks ago. She is here today because she has a residual persistent cough, fatigue and shortness of breath. The cough and shortness of breath are made worse with talking and exertion. She denies fever or chills. No productive mucous or hemoptysis. She was discharged with oxygen but is no longer using it. She has been continuing with her incentive spirometer. No chest pain or lower extremity swelling or pain.     History reviewed. No pertinent past medical history.,h    History reviewed. No pertinent surgical history.    History reviewed. No pertinent family history.     No Known Allergies    Medications, Allergies, and current problem list reviewed today in Epic      Review of Systems   Constitutional: Positive for malaise/fatigue. Negative for chills and fever.   HENT: Negative for congestion.         Dry throat   Respiratory: Positive for cough and shortness of breath. Negative for hemoptysis, sputum production and wheezing.    Cardiovascular: Negative for chest pain, palpitations and leg swelling.   Gastrointestinal: Negative for abdominal pain, diarrhea, nausea and vomiting.   Musculoskeletal: Negative for myalgias.   Skin: Negative for rash.   Neurological: Negative for dizziness and headaches.     All other systems reviewed and are negative.            Objective     /80   Pulse 94   Temp 36.4 °C (97.5 °F) (Temporal)   Resp 18   Ht 1.549 m (5' 1\")   Wt 62.1 kg (137 lb)   LMP  (LMP Unknown)   SpO2 94%   BMI 25.89 kg/m²      Physical Exam  Constitutional:       General: She is not in acute distress.     Appearance: She is not ill-appearing.   HENT:      Head: Normocephalic and atraumatic.      Nose: Nose normal. "   Eyes:      Conjunctiva/sclera: Conjunctivae normal.   Cardiovascular:      Rate and Rhythm: Normal rate and regular rhythm.      Heart sounds: Normal heart sounds.   Pulmonary:      Effort: Respiratory distress (mild tachypnea with talking ) present.      Breath sounds: No wheezing, rhonchi or rales.      Comments: Decreased breath sounds in right lower lung  Musculoskeletal:      Comments: Lower extremities without TTP or edema   Skin:     General: Skin is warm and dry.   Neurological:      General: No focal deficit present.      Mental Status: She is alert.   Psychiatric:         Mood and Affect: Mood normal.         Behavior: Behavior normal.         Thought Content: Thought content normal.         Judgment: Judgment normal.              12/14/2021 8:51 PM     HISTORY/REASON FOR EXAM: Shortness of Breath; recent covid.     TECHNIQUE/EXAM DESCRIPTION AND NUMBER OF VIEWS:  Two views of the chest.     COMPARISON:  11/1/2021     FINDINGS:  Cardiomediastinal contours are stable with some aortic ectasia.     Mild right hemidiaphragm elevation. Peripheral mostly reticular opacity is again seen. There is some consolidation now visualized in both midlung zones peripherally     No pleural space process is evident.     IMPRESSION:     Moderate bilateral pulmonary opacification with a combination of reticular and consolidative opacity. The consolidation has worsened and this indicates some scarring with superimposed airspace filling as can be seen with pneumonia                   Assessment & Plan        1. Pneumonia of both lower lobes due to infectious organism  DX-CHEST-2 VIEWS    Referral to Pulmonary and Sleep Medicine    doxycycline (VIBRAMYCIN) 100 MG Tab    benzonatate (TESSALON) 200 MG capsule    albuterol 108 (90 Base) MCG/ACT Aero Soln inhalation aerosol               2. History of COVID-19  DX-CHEST-2 VIEWS    Referral to Pulmonary and Sleep Medicine    doxycycline (VIBRAMYCIN) 100 MG Tab    benzonatate  (TESSALON) 200 MG capsule    albuterol 108 (90 Base) MCG/ACT Aero Soln inhalation aerosol                   X-ray reviewed. Agree with RAD above.            Current Outpatient Medications:   •  doxycycline (VIBRAMYCIN) 100 MG Tab, Take 1 Tablet by mouth 2 times a day for 10 days., Disp: 20 Tablet, Rfl: 0  •  benzonatate (TESSALON) 200 MG capsule, Take 1 Capsule by mouth 3 times a day as needed for Cough., Disp: 30 Capsule, Rfl: 0  •  albuterol 108 (90 Base) MCG/ACT Aero Soln inhalation aerosol, Inhale 1 Puff every 6 hours as needed for Shortness of Breath., Disp: 8.5 g, Rfl: 0    Referral placed to Pulmonology to participate in COVID recovery program.     Advised RTC in 1 week for repeat X-ray to ensure improvement.    ER if any drastic worsening in symptoms.    Differential diagnoses, Supportive care, and indications for immediate follow-up discussed with patient.   Pathogenesis of diagnosis discussed including typical length and natural progression.   Instructed to return to clinic or nearest emergency department for any change in condition, further concerns, or worsening of symptoms.    The patient demonstrated a good understanding and agreed with the treatment plan.    Katia Guajardo P.A.-C.

## 2021-12-21 ENCOUNTER — OFFICE VISIT (OUTPATIENT)
Dept: URGENT CARE | Facility: CLINIC | Age: 66
End: 2021-12-21
Payer: COMMERCIAL

## 2021-12-21 ENCOUNTER — APPOINTMENT (OUTPATIENT)
Dept: RADIOLOGY | Facility: IMAGING CENTER | Age: 66
End: 2021-12-21
Attending: NURSE PRACTITIONER
Payer: COMMERCIAL

## 2021-12-21 VITALS
BODY MASS INDEX: 25.64 KG/M2 | HEART RATE: 80 BPM | RESPIRATION RATE: 20 BRPM | TEMPERATURE: 98 F | HEIGHT: 61 IN | WEIGHT: 135.8 LBS | DIASTOLIC BLOOD PRESSURE: 60 MMHG | SYSTOLIC BLOOD PRESSURE: 120 MMHG | OXYGEN SATURATION: 96 %

## 2021-12-21 DIAGNOSIS — R05.9 COUGH: ICD-10-CM

## 2021-12-21 DIAGNOSIS — R06.02 SOB (SHORTNESS OF BREATH): ICD-10-CM

## 2021-12-21 DIAGNOSIS — Z86.16 HISTORY OF COVID-19: ICD-10-CM

## 2021-12-21 PROCEDURE — 93000 ELECTROCARDIOGRAM COMPLETE: CPT | Performed by: NURSE PRACTITIONER

## 2021-12-21 PROCEDURE — 71046 X-RAY EXAM CHEST 2 VIEWS: CPT | Mod: TC | Performed by: NURSE PRACTITIONER

## 2021-12-21 PROCEDURE — 99214 OFFICE O/P EST MOD 30 MIN: CPT | Performed by: NURSE PRACTITIONER

## 2021-12-21 ASSESSMENT — ENCOUNTER SYMPTOMS
SHORTNESS OF BREATH: 1
COUGH: 1
CHILLS: 0
SORE THROAT: 0
FEVER: 0
DIZZINESS: 0
VOMITING: 0
EYE PAIN: 0
NAUSEA: 0
MYALGIAS: 0

## 2021-12-21 ASSESSMENT — FIBROSIS 4 INDEX: FIB4 SCORE: 0.96

## 2021-12-22 NOTE — PROGRESS NOTES
Subjective:   Vicenta Saeed is a 66 y.o. female who presents for Cough (coughing fits at night, follow up from ER. Pt needs spacer/inhaler. Chest/breathing. )      Cough  This is a recurrent problem. The current episode started more than 1 month ago (Previously seen 12/14 diagnosed with Covid pneumonia was treated with antibiotics on suspicion of being secondary referred to pulmonology.  Has a follow-up appointment with pulmonology in January). The problem has been unchanged. The problem occurs constantly. The cough is non-productive. Associated symptoms include chest pain and shortness of breath. Pertinent negatives include no chills, fever, myalgias, rash or sore throat. Nothing aggravates the symptoms. She has tried a beta-agonist inhaler (abx) for the symptoms. The treatment provided no relief. COVID        Review of Systems   Constitutional: Positive for malaise/fatigue. Negative for chills and fever.   HENT: Negative for sore throat.    Eyes: Negative for pain.   Respiratory: Positive for cough and shortness of breath.    Cardiovascular: Positive for chest pain.   Gastrointestinal: Negative for nausea and vomiting.   Genitourinary: Negative for hematuria.   Musculoskeletal: Negative for myalgias.   Skin: Negative for rash.   Neurological: Negative for dizziness.       Medications:    • acetaminophen Tabs  • albuterol Aers  • benzonatate  • doxycycline Tabs  • vitamin D Tabs  • WOMENS MULTIVITAMIN PO    Allergies: Patient has no known allergies.    Problem List: Vicenta Saeed does not have any pertinent problems on file.    Surgical History:  No past surgical history on file.    Past Social Hx: Vicenta Saeed  reports that she has never smoked. She has never used smokeless tobacco. She reports previous alcohol use. She reports that she does not use drugs.     Past Family Hx:  Vicenta Saeed family history is not on file.     Problem list, medications, and allergies reviewed by myself today in Epic.      "Objective:     /60   Pulse 80   Temp 36.7 °C (98 °F)   Resp 20   Ht 1.549 m (5' 1\")   Wt 61.6 kg (135 lb 12.8 oz)   LMP  (LMP Unknown)   SpO2 96%   BMI 25.66 kg/m²     Physical Exam  Vitals and nursing note reviewed.   Constitutional:       General: She is not in acute distress.     Appearance: She is well-developed.   HENT:      Head: Normocephalic and atraumatic.      Right Ear: Tympanic membrane and external ear normal.      Left Ear: Tympanic membrane and external ear normal.      Nose: Nose normal.      Right Sinus: No maxillary sinus tenderness or frontal sinus tenderness.      Left Sinus: No maxillary sinus tenderness or frontal sinus tenderness.      Mouth/Throat:      Mouth: Mucous membranes are moist.      Pharynx: Uvula midline. No posterior oropharyngeal erythema.      Tonsils: No tonsillar exudate or tonsillar abscesses.   Eyes:      General:         Right eye: No discharge.         Left eye: No discharge.      Conjunctiva/sclera: Conjunctivae normal.   Cardiovascular:      Rate and Rhythm: Normal rate.   Pulmonary:      Effort: Pulmonary effort is normal. No respiratory distress.      Breath sounds: Normal breath sounds.   Abdominal:      General: There is no distension.   Musculoskeletal:         General: Normal range of motion.   Skin:     General: Skin is warm and dry.   Neurological:      General: No focal deficit present.      Mental Status: She is alert and oriented to person, place, and time. Mental status is at baseline.      Gait: Gait (gait at baseline) normal.   Psychiatric:         Judgment: Judgment normal.         Assessment/Plan:     Diagnosis and associated orders:     1. Cough  DX-CHEST-2 VIEWS    EKG - Clinic Performed   2. History of COVID-19  DX-CHEST-2 VIEWS    EKG - Clinic Performed   3. SOB (shortness of breath)  DX-CHEST-2 VIEWS    EKG - Clinic Performed      Comments/MDM:   • I independently reviewed the patient's imaging and agree with the interpretation of the " radiologist.    Patchy peripheral airspace opacity throughout both lungs, in keeping with persistent Covid 19 pneumonia.  EKG: NSR rate: 85  , normal axis, normal intervals, no evidence of ischemia or hypertrophy.    I personally reviewed prior external notes and prior test results pertinent to today's visit.  Clinically patient having no hypoxia, is not tachypneic, pulse ox adequate encouraged to continue with symptom supportive care patient is scheduled to follow-up with PCP next week advised to follow-up as scheduled.  Is scheduled to see pulmonology in January.  Discussed management options, risks and benefits, and alternatives to treatment plan agreed upon.   Red flags discussed and indications to immediately call 911 or present to the Emergency Department.   Supportive care, differential diagnoses, and indications for immediate follow-up discussed with patient.    • Patient expresses understanding and agrees to plan. Patient denies any other questions or concerns.   •          My total time spent caring for the patient on the day of the encounter was 30 minutes.   This does not include time spent on separately billable procedures/tests.        Please note that this dictation was created using voice recognition software. I have made a reasonable attempt to correct obvious errors, but I expect that there are errors of grammar and possibly content that I did not discover before finalizing the note.    This note was electronically signed by Lenny JOHNSON.

## 2021-12-27 ENCOUNTER — OFFICE VISIT (OUTPATIENT)
Dept: MEDICAL GROUP | Facility: CLINIC | Age: 66
End: 2021-12-27
Payer: COMMERCIAL

## 2021-12-27 VITALS
BODY MASS INDEX: 25.49 KG/M2 | WEIGHT: 135 LBS | SYSTOLIC BLOOD PRESSURE: 110 MMHG | RESPIRATION RATE: 18 BRPM | DIASTOLIC BLOOD PRESSURE: 76 MMHG | HEIGHT: 61 IN | OXYGEN SATURATION: 95 % | HEART RATE: 74 BPM

## 2021-12-27 DIAGNOSIS — R06.02 SHORTNESS OF BREATH: ICD-10-CM

## 2021-12-27 PROCEDURE — 99213 OFFICE O/P EST LOW 20 MIN: CPT | Performed by: FAMILY MEDICINE

## 2021-12-27 ASSESSMENT — FIBROSIS 4 INDEX: FIB4 SCORE: 0.96

## 2021-12-27 NOTE — PROGRESS NOTES
Subjective:     CC: Shortness of breath    HPI:   Vicenta presents today with shortness of breath after being infected with Covid.  She was discharged on November 23 after jarod Covid and [place].  She was hospitalized for 3 weeks.  She was diagnosed of having Covid pneumonia.  She was discharged home with albuterol inhaler only.  She still describes as being very fatigued and shortness of breath with any activity.    No other medical problems.    Problem   Shortness of Breath    Had COVID, discharged 11/13.  Got COVID in [place].  Was hospitalized for 3 weeks.  Had CoVID pneumonia.  Breathing improving.  When talking runs out of breath.  Fatigue when active. Wheezes occasionally.  Albuterol helps.           Current Outpatient Medications Ordered in Epic   Medication Sig Dispense Refill   • fluticasone-salmeterol (ADVAIR) 100-50 MCG/DOSE AEROSOL POWDER, BREATH ACTIVATED Inhale 1 Puff every 12 hours. 1 Each 11   • benzonatate (TESSALON) 200 MG capsule Take 1 Capsule by mouth 3 times a day as needed for Cough. 30 Capsule 0   • albuterol 108 (90 Base) MCG/ACT Aero Soln inhalation aerosol Inhale 1 Puff every 6 hours as needed for Shortness of Breath. 8.5 g 0   • vitamin D3 (VITAMIND D3) 1000 UNIT Tab Take 1 Tablet by mouth every day. 30 Tablet 0   • Multiple Vitamins-Minerals (WOMENS MULTIVITAMIN PO) Take 1 Tablet by mouth every day.     • acetaminophen (TYLENOL) 500 MG Tab Take 1 Tablet by mouth every 6 hours as needed for Mild Pain or Moderate Pain.       No current Epic-ordered facility-administered medications on file.       Health Maintenance:     ROS:  Gen: no fevers/chills, no changes in weight  Eyes: no changes in vision  ENT: no sore throat, no hearing loss, no bloody nose  Pulm: no sob, no cough  CV: no chest pain, no palpitations  GI: no nausea/vomiting, no diarrhea  : no dysuria  MSk: no myalgias  Skin: no rash  Neuro: no headaches, no numbness/tingling  Heme/Lymph: no easy  "bruising      Objective:     Exam:  /76 (BP Location: Right arm, Patient Position: Sitting, BP Cuff Size: Adult)   Pulse 74   Resp 18   Ht 1.549 m (5' 1\")   Wt 61.2 kg (135 lb)   LMP  (LMP Unknown)   SpO2 95%   BMI 25.51 kg/m²  Body mass index is 25.51 kg/m².    Gen: Alert and oriented, No apparent distress.  Neck: Neck is supple without lymphadenopathy.  Lungs: Normal effort, CTA bilaterally, decreased respiratory sounds and occasional wheezes.  CV: Regular rate and rhythm. No murmurs, rubs, or gallops.  Ext: No clubbing, cyanosis, edema.      Labs: Reviewed labs.  Chest x-ray showed patchy infiltrates consistent with Covid pneumonia.  Labs did show mild elevation of liver test.    Assessment & Plan:     66 y.o. female with the following -     Problem List Items Addressed This Visit     Shortness of breath     pulmocort will add                   Return in about 4 weeks (around 1/24/2022) for COVID follow up.    Please note that this dictation was created using voice recognition software. I have made every reasonable attempt to correct obvious errors, but I expect that there are errors of grammar and possibly content that I did not discover before finalizing the note.      "

## 2022-01-19 ENCOUNTER — OFFICE VISIT (OUTPATIENT)
Dept: MEDICAL GROUP | Facility: CLINIC | Age: 67
End: 2022-01-19
Payer: MEDICARE

## 2022-01-19 VITALS
WEIGHT: 135 LBS | HEART RATE: 65 BPM | DIASTOLIC BLOOD PRESSURE: 74 MMHG | HEIGHT: 61 IN | BODY MASS INDEX: 25.49 KG/M2 | RESPIRATION RATE: 16 BRPM | SYSTOLIC BLOOD PRESSURE: 110 MMHG | OXYGEN SATURATION: 95 %

## 2022-01-19 DIAGNOSIS — G89.29 CHRONIC RIGHT SHOULDER PAIN: ICD-10-CM

## 2022-01-19 DIAGNOSIS — M25.511 CHRONIC RIGHT SHOULDER PAIN: ICD-10-CM

## 2022-01-19 DIAGNOSIS — R06.02 SHORTNESS OF BREATH: ICD-10-CM

## 2022-01-19 PROBLEM — J45.909 ASTHMA WITHOUT STATUS ASTHMATICUS: Status: ACTIVE | Noted: 2022-01-19

## 2022-01-19 PROCEDURE — 99214 OFFICE O/P EST MOD 30 MIN: CPT | Performed by: FAMILY MEDICINE

## 2022-01-19 RX ORDER — PREDNISONE 5 MG/1
5 TABLET ORAL DAILY
Qty: 30 TABLET | Refills: 3 | Status: SHIPPED | OUTPATIENT
Start: 2022-01-19 | End: 2022-03-08

## 2022-01-19 RX ORDER — FLUTICASONE PROPIONATE 50 MCG
SPRAY, SUSPENSION (ML) NASAL
COMMUNITY
Start: 2022-01-11 | End: 2022-03-08

## 2022-01-19 ASSESSMENT — FIBROSIS 4 INDEX: FIB4 SCORE: 0.96

## 2022-01-19 NOTE — ASSESSMENT & PLAN NOTE
Continue Wixela and albuterol.    Lungs clear on exam  Will add low dose prednisone  Recheck one month

## 2022-01-19 NOTE — PROGRESS NOTES
Subjective:     CC: Follow-up for shortness of breath    HPI:   Vicenta presents today with shortness of the breath.  Vicenta has had issues with breathing ever since her COVID infection in November.  She was seen by pulmonary specialist 2 weeks ago who put her on another inhaler Wixela and discontinued her fluticasone Advair inhaler.  She is not noticing much improvement of shortness of breath however her cough is largely disappeared.    No longer also has problem with chronic shoulder pain.  The shoulder that bothers her the most is right shoulder.  She knows of no injury.  She was given a shoulder injection by  [facility] and felt that that helped a lot.    Health maintenance: We discussed different things that she is deficient on her annual wellness visit, COVID-vaccine, tetanus shot, Pap smears, and she feels that she will follow-up with these in the near future.    Problem   Asthma Without Status Asthmaticus   Shoulder Pain, Right    Pain for several years.  Had injection in past that helped.       Shortness of Breath    Had COVID, discharged 11/13.  Got COVID in [facility].  Was hospitalized for 3 weeks.  Had CoVID pneumonia.  Breathing improving.  When talking runs out of breath.  Fatigue when active. Wheezes occasionally.  Albuterol helps.  Went to Pulm specialist.  Presscribed another inhaler.  Wixela.  On for 2 weeks and no improvement yet         Current Outpatient Medications Ordered in Epic   Medication Sig Dispense Refill   • WIXELA INHUB 500-50 MCG/DOSE AEROSOL POWDER, BREATH ACTIVATED      • fluticasone (FLONASE) 50 MCG/ACT nasal spray      • predniSONE (DELTASONE) 5 MG Tab Take 1 Tablet by mouth every day. 30 Tablet 3   • albuterol 108 (90 Base) MCG/ACT Aero Soln inhalation aerosol Inhale 1 Puff every 6 hours as needed for Shortness of Breath. 8.5 g 0   • vitamin D3 (VITAMIND D3) 1000 UNIT Tab Take 1 Tablet by mouth every day. 30 Tablet 0   • Multiple Vitamins-Minerals (WOMENS  "MULTIVITAMIN PO) Take 1 Tablet by mouth every day.       No current [facility]-ordered facility-administered medications on file.       Health Maintenance:     ROS:  Gen: no fevers/chills, no changes in weight  Eyes: no changes in vision  ENT: no sore throat, no hearing loss, no bloody nose  Pulm: no sob, no cough  CV: no chest pain, no palpitations  GI: no nausea/vomiting, no diarrhea  : no dysuria  MSk: no myalgias  Skin: no rash  Neuro: no headaches, no numbness/tingling  Heme/Lymph: no easy bruising      Objective:     Exam:  /74 (BP Location: Right arm, Patient Position: Sitting, BP Cuff Size: Adult)   Pulse 65   Resp 16   Ht 1.549 m (5' 1\")   Wt 61.2 kg (135 lb)   LMP  (LMP Unknown)   SpO2 95%   BMI 25.51 kg/m²  Body mass index is 25.51 kg/m².    Gen: Alert and oriented, No apparent distress.  Neck: Neck is supple without lymphadenopathy.  Lungs: Normal effort, CTA bilaterally, no wheezes, rhonchi, or rales  CV: Regular rate and rhythm. No murmurs, rubs, or gallops.  Ext: No clubbing, cyanosis, edema.  Right shoulder with full range of motion.  She does have tenderness in the short head of the biceps tendon.  She does have slight apprehension and a slight positive and empty can test.      Labs: none    Assessment & Plan:     66 y.o. female with the following -     Problem List Items Addressed This Visit     Shortness of breath     Continue Wixela and albuterol.    Lungs clear on exam  Will add low dose prednisone  Recheck one month         Shoulder pain, right     Will see how prednisone helps, consider injection next visit and xray                   Return in about 4 weeks (around 2/16/2022) for shortness of breath.    Please note that this dictation was created using voice recognition software. I have made every reasonable attempt to correct obvious errors, but I expect that there are errors of grammar and possibly content that I did not discover before finalizing the note.      "

## 2022-03-08 ENCOUNTER — OFFICE VISIT (OUTPATIENT)
Dept: MEDICAL GROUP | Facility: CLINIC | Age: 67
End: 2022-03-08
Payer: MEDICARE

## 2022-03-08 VITALS
SYSTOLIC BLOOD PRESSURE: 130 MMHG | RESPIRATION RATE: 17 BRPM | WEIGHT: 140 LBS | OXYGEN SATURATION: 94 % | BODY MASS INDEX: 25.76 KG/M2 | HEART RATE: 78 BPM | HEIGHT: 62 IN | DIASTOLIC BLOOD PRESSURE: 68 MMHG

## 2022-03-08 DIAGNOSIS — J18.9 PNEUMONIA OF BOTH LOWER LOBES DUE TO INFECTIOUS ORGANISM: ICD-10-CM

## 2022-03-08 DIAGNOSIS — Z23 NEED FOR VACCINATION: ICD-10-CM

## 2022-03-08 DIAGNOSIS — Z86.16 HISTORY OF COVID-19: ICD-10-CM

## 2022-03-08 DIAGNOSIS — U09.9 POST-COVID CHRONIC COUGH: Primary | ICD-10-CM

## 2022-03-08 DIAGNOSIS — R05.3 POST-COVID CHRONIC COUGH: Primary | ICD-10-CM

## 2022-03-08 PROBLEM — K21.9 GERD (GASTROESOPHAGEAL REFLUX DISEASE): Status: RESOLVED | Noted: 2021-11-04 | Resolved: 2022-03-08

## 2022-03-08 PROCEDURE — 99213 OFFICE O/P EST LOW 20 MIN: CPT | Mod: GE,CS | Performed by: STUDENT IN AN ORGANIZED HEALTH CARE EDUCATION/TRAINING PROGRAM

## 2022-03-08 RX ORDER — ALBUTEROL SULFATE 90 UG/1
1 AEROSOL, METERED RESPIRATORY (INHALATION) EVERY 6 HOURS PRN
Qty: 8.5 G | Refills: 0 | Status: SHIPPED | OUTPATIENT
Start: 2022-03-08 | End: 2022-04-22 | Stop reason: SDUPTHER

## 2022-03-08 RX ORDER — FLUTICASONE PROPIONATE 44 MCG
1 AEROSOL WITH ADAPTER (GRAM) INHALATION 2 TIMES DAILY
Qty: 1 EACH | Refills: 2 | Status: SHIPPED | OUTPATIENT
Start: 2022-03-08 | End: 2022-04-22 | Stop reason: SDUPTHER

## 2022-03-08 ASSESSMENT — FIBROSIS 4 INDEX: FIB4 SCORE: 0.97

## 2022-03-08 ASSESSMENT — PATIENT HEALTH QUESTIONNAIRE - PHQ9: CLINICAL INTERPRETATION OF PHQ2 SCORE: 0

## 2022-03-08 NOTE — PROGRESS NOTES
HPI:  Patient is a 67 y.o. female. This pleasant patient is here today     Problem   Post-Covid Chronic Cough   Gerd (Gastroesophageal Reflux Disease) (Resolved)          patient is here today to reestablish with another provider as was seeing an attending that is not in the office as often these days.  Patient is new to me, with primary concern about her recent (he 4 months ago (infection that caused her to need hospitalization breaks out based on hypoxia and was found to have persistent hypotension needing midodrine.  Upon discharge she was sent with oxygen as well as a midodrine taper.  Since then she feels like she is over all improving however finds her persistent fatigue, chronic cough, as well as some memory issues.  For example she states that she almost forgot about this appointment today, which is very unusual for her.  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  She has been taking care of him since he was 11 days old and is finding these changes are making it more difficult to be the guardian she was before.  However she has a positive attitude and is here today hoping to get information on her chronic cough as well as discuss an inhaler which might help.    H/o asthma? DX on chart, patient denies previous PFTs   20 years ago had pneumonia and utilized an inhaler at that time but per patient was not told she had asthma. 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  She says states she has had pneumonia in the past and that was the last time she used an inhaler.    Seen by pulm after referral from urgent care. Some of that visit note is available but no MDM, asthma as DX with t her last visit she has been prescribed several inhalers (advair,  "albuterol and intranasal steroids) and was supposed to follow up in 6 weeks however office is in [place] and due to cost issues, found it too far.                                                                                                           Patient Active Problem List    Diagnosis Date Noted   • Post-COVID chronic cough 03/08/2022   • Asthma without status asthmaticus 01/19/2022   • Shoulder pain, right 01/19/2022   • Shortness of breath 12/27/2021   • Hyponatremia 11/08/2021   • Transaminitis 11/03/2021   • ACP (advance care planning) 11/03/2021   • Acute hypoxemic respiratory failure due to COVID-19 (HCC) 10/30/2021   • Hypotension due to hypovolemia 10/30/2021       Current Outpatient Medications   Medication Sig Dispense Refill   • fluticasone (FLOVENT HFA) 44 MCG/ACT Aerosol Inhale 1 Puff 2 times a day. 1 Each 2   • albuterol 108 (90 Base) MCG/ACT Aero Soln inhalation aerosol Inhale 1 Puff every 6 hours as needed for Shortness of Breath. 8.5 g 0   • vitamin D3 (VITAMIND D3) 1000 UNIT Tab Take 1 Tablet by mouth every day. 30 Tablet 0   • Multiple Vitamins-Minerals (WOMENS MULTIVITAMIN PO) Take 1 Tablet by mouth every day.       No current facility-administered medications for this visit.         Allergies as of 03/08/2022   • (No Known Allergies)          /68 (BP Location: Left arm, Patient Position: Sitting, BP Cuff Size: Adult)   Pulse 78   Resp 17   Ht 1.562 m (5' 1.5\")   Wt 63.5 kg (140 lb)   LMP  (LMP Unknown)   SpO2 94%   BMI 26.02 kg/m²     Gen: no fevers/chills, no changes in weight  Eyes: no changes in vision  ENT: no sore throat, no hearing loss, no bloody nose  Pulm: + sob, +cough (sometimes productive, teaspoon  CV: no chest pain, no palpitations  GI: no nausea/vomiting, no diarrhea  : no dysuria or flank pain  MSk: no myalgias  Skin: no rash  Psych: no change in mood   Neuro: no headaches, no numbness/tingling  Heme/Lymph: no easy bruising or bleeding    Physical " Exam:  Gen:         Alert and oriented, No apparent distress.  Neck:        No Lymphadenopathy or Bruits.  Lungs:     Exam limited by cough reflex from deep breath, lung sounds appreciated in all fields, heard air moving well, unable to discern whether adventious sounds were present.   CV:           Regular rate and rhythm. No murmurs, rubs or gallops.    Abdomen: soft, nontender, nondistended.    Skin:      no rash or exudate             Ext:          No clubbing, cyanosis, edema.      Assessment and Plan    67 y.o. female with the following-  Problem List Items Addressed This Visit     Post-COVID chronic cough - Primary     4 months out from initial infection, denies fever and no indication there is secondary infection or continued infectious process, persistent fatigue and cough are improving, however slowly.   Cough is bothersome more than anything, advised to use Robitussin PRN (before Uatsdin).  --Flovent daily (BID) and albuterol PRN, gave directions as to daily use and when to use albuterol  --At this time, will trial inhalers to see if improved cough as well as feeling more SOB with exertion.  --f/u 1-2 months to reasses  --at this time will hold off on further imaging or testing as will not change course of treatment and no suspicion for pneumonia.   --at this time will not refer out to another pulmonology office, will consider PFTs in the future is COPD or Asthma type symptoms persist.          Relevant Medications    fluticasone (FLOVENT HFA) 44 MCG/ACT Aerosol    albuterol 108 (90 Base) MCG/ACT Aero Soln inhalation aerosol      Other Visit Diagnoses     Need for vaccination        Pneumonia of both lower lobes due to infectious organism        Relevant Medications    fluticasone (FLOVENT HFA) 44 MCG/ACT Aerosol    albuterol 108 (90 Base) MCG/ACT Aero Soln inhalation aerosol    History of COVID-19             No follow-ups on file.    Kitty Rizo M.D.

## 2022-03-08 NOTE — ASSESSMENT & PLAN NOTE
4 months out from initial infection, denies fever and no indication there is secondary infection or continued infectious process, persistent fatigue and cough are improving, however slowly.   Cough is bothersome more than anything, advised to use Robitussin PRN (before [facility]).  --Flovent daily (BID) and albuterol PRN, gave directions as to daily use and when to use albuterol  --At this time, will trial inhalers to see if improved cough as well as feeling more SOB with exertion.  --f/u 1-2 months to reasses  --at this time will hold off on further imaging or testing as will not change course of treatment and no suspicion for pneumonia.   --at this time will not refer out to another pulmonology office, will consider PFTs in the future is COPD or Asthma type symptoms persist.     =at next visit will address Health Maintenance screenings

## 2022-04-22 ENCOUNTER — OFFICE VISIT (OUTPATIENT)
Dept: MEDICAL GROUP | Facility: CLINIC | Age: 67
End: 2022-04-22
Payer: MEDICARE

## 2022-04-22 VITALS
DIASTOLIC BLOOD PRESSURE: 70 MMHG | WEIGHT: 139.7 LBS | HEART RATE: 82 BPM | HEIGHT: 63 IN | SYSTOLIC BLOOD PRESSURE: 104 MMHG | BODY MASS INDEX: 24.75 KG/M2 | OXYGEN SATURATION: 94 % | TEMPERATURE: 97.8 F

## 2022-04-22 DIAGNOSIS — G89.29 CHRONIC RIGHT SHOULDER PAIN: ICD-10-CM

## 2022-04-22 DIAGNOSIS — M25.511 CHRONIC RIGHT SHOULDER PAIN: ICD-10-CM

## 2022-04-22 DIAGNOSIS — R05.3 POST-COVID CHRONIC COUGH: ICD-10-CM

## 2022-04-22 DIAGNOSIS — U09.9 POST-COVID CHRONIC COUGH: ICD-10-CM

## 2022-04-22 DIAGNOSIS — R05.9 COUGH: ICD-10-CM

## 2022-04-22 PROCEDURE — 99213 OFFICE O/P EST LOW 20 MIN: CPT | Mod: GE,CS | Performed by: STUDENT IN AN ORGANIZED HEALTH CARE EDUCATION/TRAINING PROGRAM

## 2022-04-22 RX ORDER — ALBUTEROL SULFATE 90 UG/1
1 AEROSOL, METERED RESPIRATORY (INHALATION) EVERY 6 HOURS PRN
Qty: 8.5 G | Refills: 0 | Status: SHIPPED | OUTPATIENT
Start: 2022-04-22

## 2022-04-22 RX ORDER — OMEPRAZOLE 20 MG/1
20 CAPSULE, DELAYED RELEASE ORAL DAILY
Qty: 30 CAPSULE | Refills: 1 | Status: SHIPPED | OUTPATIENT
Start: 2022-04-22

## 2022-04-22 RX ORDER — FLUTICASONE PROPIONATE 50 MCG
SPRAY, SUSPENSION (ML) NASAL
COMMUNITY
Start: 2022-01-11

## 2022-04-22 RX ORDER — FLUTICASONE PROPIONATE 44 MCG
1 AEROSOL WITH ADAPTER (GRAM) INHALATION 2 TIMES DAILY
Qty: 1 EACH | Refills: 2 | Status: SHIPPED | OUTPATIENT
Start: 2022-04-22

## 2022-04-22 ASSESSMENT — FIBROSIS 4 INDEX: FIB4 SCORE: 0.97

## 2022-04-22 NOTE — PROGRESS NOTES
HPI:  Patient is a 67 y.o. female. This pleasant patient is here today to follow up on chronic cough- has had for months lingering after her COVID infection.  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  Last visit trial of inhaled steroids, did not notice a significant difference.  Flovent 45  Albuterol PRN, does feel it improve her breathing but not cough    This visit noted some difficulty swallowing (liquid and solids), feeling she has to forcefully swallow to ensure substances don't come back up, has not inhaled in food.  She denies GERMAN type symptoms, feels refreshed without needing a nap despite chasing around her young son.  She denies chest discomfort c/w heartburn, no bad taste in mouth in AM.       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 30 Capsule 1   • albuterol 108 (90 Base) MCG/ACT Aero Soln inhalation aerosol Inhale 1 Puff every 6 hours as needed for Shortness of Breath. 8.5 g 0   • fluticasone (FLOVENT HFA) 44 MCG/ACT Aerosol Inhale 1 Puff 2 times a day. 1 Each 2   • fluticasone (FLONASE) 50 MCG/ACT nasal spray 2 spray(s) (Patient not taking:  "Reported on 4/22/2022)     • fluticasone-salmeterol (ADVAIR) 100-50 MCG/DOSE AEROSOL POWDER, BREATH ACTIVATED 1 INH (Patient not taking: Reported on 4/22/2022)     • fluticasone-salmeterol (ADVAIR) 500-50 MCG/DOSE AEROSOL POWDER, BREATH ACTIVATED 1 puff(s) (Patient not taking: Reported on 4/22/2022)     • vitamin D3 (VITAMIND D3) 1000 UNIT Tab Take 1 Tablet by mouth every day. (Patient not taking: Reported on 4/22/2022) 30 Tablet 0   • Multiple Vitamins-Minerals (WOMENS MULTIVITAMIN PO) Take 1 Tablet by mouth every day. (Patient not taking: Reported on 4/22/2022)       No current facility-administered medications for this visit.         Allergies as of 04/22/2022   • (No Known Allergies)          /70 (BP Location: Right arm, Patient Position: Sitting, BP Cuff Size: Adult)   Pulse 82   Temp 36.6 °C (97.8 °F) (Temporal)   Ht 1.6 m (5' 3\")   Wt 63.4 kg (139 lb 11.2 oz)   LMP  (LMP Unknown)   SpO2 94%   BMI 24.75 kg/m²     Gen: no fever/chills  CV: No chest pain  Resp: No SOB  GI/: No bowel or bladder complaints  Psych: No depression      Physical Exam:  Gen:         Alert and oriented, No apparent distress.  Neck:        No Lymphadenopathy  Lungs:     Clear to auscultation bilaterally  CV:           Regular rate and rhythm. No murmurs, rubs or gallops.    Skin:      no rash or exudate             Ext:          No clubbing, cyanosis, edema.      Assessment and Plan    67 y.o. female with the following-  Problem List Items Addressed This Visit     Shoulder pain, right    Post-COVID chronic cough    Relevant Medications    albuterol 108 (90 Base) MCG/ACT Aero Soln inhalation aerosol    fluticasone (FLOVENT HFA) 44 MCG/ACT Aerosol    Other Relevant Orders    PULMONARY FUNCTION TESTS -Test requested: Spirometry, simple    Cough     Unclear etiology of cough, likely mutlifactorial, suspect some aspect of COPD/asthma given exposure history but with recent COVID infection and subsequent fibrosis that is being " seen in post-covid patients, PFTs will help in determining what amount of obstruction/fibrosis might be present.  CS without improvement, can continue, PRN albuterol with wheezing.  Trial of PPI to rule out esophageal basis of cough, more likely now aware of swallowing challenges - not rapid weight loss, night sweats or concerning symptoms at this time, RTC 4 weeks after taking PPI, can reevaluate necessity of working out swallowing issues (never smoker, does enjoy hot beverages)           Relevant Medications    omeprazole (PRILOSEC) 20 MG delayed-release capsule         RTC 4 weeks  Kitty Rizo M.D.      no

## 2022-04-27 NOTE — ASSESSMENT & PLAN NOTE
Unclear etiology of cough, likely mutlifactorial, suspect some aspect of COPD/asthma given exposure history but with recent COVID infection and subsequent fibrosis that is being seen in post-covid patients, PFTs will help in determining what amount of obstruction/fibrosis might be present.  CS without improvement, can continue, PRN albuterol with wheezing.  Trial of PPI to rule out esophageal basis of cough, more likely now aware of swallowing challenges - not rapid weight loss, night sweats or concerning symptoms at this time, RTC 4 weeks after taking PPI, can reevaluate necessity of working out swallowing issues (never smoker, does enjoy hot beverages)

## 2022-05-31 ENCOUNTER — NON-PROVIDER VISIT (OUTPATIENT)
Dept: MEDICAL GROUP | Facility: OTHER | Age: 67
End: 2022-05-31
Payer: MEDICARE

## 2022-05-31 DIAGNOSIS — Z11.1 SCREENING FOR TUBERCULOSIS: ICD-10-CM

## 2022-05-31 NOTE — PROGRESS NOTES
"Vicenta Saeed is a 67 y.o. female here for a non-provider visit for PPD placement -- Step 1 of 1    Reason for PPD:  work requirement    1. TB evaluation questionnaire completed by patient? {YES (DEF)/NO:29191::\"Yes\"}      -  If any answers marked yes did you contact a provider prior to placing? {YES/NO/NOT INDICATED:10761}  2.  Patient notified to return to clinic for reading on: ***  3.  PPD Placement documentation completed on TB evaluation questionnaire? {YES (DEF)/NO:72572::\"Yes\"}  4.  Location of TB evaluation questionnaire filed: ***  "

## 2022-06-13 ENCOUNTER — NON-PROVIDER VISIT (OUTPATIENT)
Dept: URGENT CARE | Facility: CLINIC | Age: 67
End: 2022-06-13
Payer: MEDICARE

## 2022-06-13 DIAGNOSIS — Z11.1 ENCOUNTER FOR PPD TEST: ICD-10-CM

## 2022-06-13 PROCEDURE — 86580 TB INTRADERMAL TEST: CPT | Performed by: FAMILY MEDICINE

## 2022-06-13 PROCEDURE — 99999 PR NO CHARGE: CPT | Performed by: FAMILY MEDICINE

## 2022-06-15 ENCOUNTER — APPOINTMENT (OUTPATIENT)
Dept: RADIOLOGY | Facility: IMAGING CENTER | Age: 67
End: 2022-06-15
Attending: NURSE PRACTITIONER
Payer: MEDICARE

## 2022-06-15 ENCOUNTER — NON-PROVIDER VISIT (OUTPATIENT)
Dept: URGENT CARE | Facility: CLINIC | Age: 67
End: 2022-06-15
Payer: MEDICARE

## 2022-06-15 DIAGNOSIS — R76.11 PPD POSITIVE: ICD-10-CM

## 2022-06-15 LAB — TB WHEAL 3D P 5 TU DIAM: ABNORMAL MM

## 2022-06-15 PROCEDURE — 71045 X-RAY EXAM CHEST 1 VIEW: CPT | Mod: TC,FY | Performed by: NURSE PRACTITIONER

## 2022-06-15 PROCEDURE — 99999 PR NO CHARGE: CPT | Performed by: NURSE PRACTITIONER

## 2022-06-15 NOTE — PROGRESS NOTES
1. Resulted in Epic under Enter/Edit Result    2. TB evaluation questionnaire scanned into chart and original given to patient.     3. Induration was 12mm.     4. CHEST XRAY ORDERED BY SHAZIA TARANGO.

## 2022-10-17 ENCOUNTER — OFFICE VISIT (OUTPATIENT)
Dept: URGENT CARE | Facility: CLINIC | Age: 67
End: 2022-10-17
Payer: COMMERCIAL

## 2022-10-17 VITALS
DIASTOLIC BLOOD PRESSURE: 60 MMHG | HEIGHT: 62 IN | OXYGEN SATURATION: 98 % | RESPIRATION RATE: 16 BRPM | HEART RATE: 85 BPM | WEIGHT: 142 LBS | SYSTOLIC BLOOD PRESSURE: 112 MMHG | TEMPERATURE: 97.8 F | BODY MASS INDEX: 26.13 KG/M2

## 2022-10-17 DIAGNOSIS — R42 DIZZINESS: ICD-10-CM

## 2022-10-17 DIAGNOSIS — H69.93 DYSFUNCTION OF BOTH EUSTACHIAN TUBES: ICD-10-CM

## 2022-10-17 DIAGNOSIS — G44.209 TENSION-TYPE HEADACHE, NOT INTRACTABLE, UNSPECIFIED CHRONICITY PATTERN: ICD-10-CM

## 2022-10-17 PROCEDURE — 99214 OFFICE O/P EST MOD 30 MIN: CPT | Performed by: PHYSICIAN ASSISTANT

## 2022-10-17 RX ORDER — CETIRIZINE HYDROCHLORIDE 10 MG/1
10 TABLET ORAL DAILY
Qty: 30 TABLET | Refills: 0 | Status: SHIPPED | OUTPATIENT
Start: 2022-10-17

## 2022-10-17 RX ORDER — MECLIZINE HYDROCHLORIDE 25 MG/1
25 TABLET ORAL 3 TIMES DAILY PRN
Qty: 30 TABLET | Refills: 0 | Status: SHIPPED | OUTPATIENT
Start: 2022-10-17

## 2022-10-17 RX ORDER — FLUTICASONE PROPIONATE 50 MCG
1 SPRAY, SUSPENSION (ML) NASAL DAILY
Qty: 16 G | Refills: 0 | Status: SHIPPED | OUTPATIENT
Start: 2022-10-17

## 2022-10-17 ASSESSMENT — FIBROSIS 4 INDEX: FIB4 SCORE: 0.97

## 2022-10-17 ASSESSMENT — ENCOUNTER SYMPTOMS: DIZZINESS: 1

## 2022-10-17 NOTE — LETTER
October 17, 2022    To Whom It May Concern:         This is confirmation that Vicenta Saeed attended her scheduled appointment with Varsha Angeles P.A.-C. on 10/17/22.         If you have any questions please do not hesitate to call me at the phone number listed below.    Sincerely,          Varsha Angeles P.A.-C.  [phone]

## 2022-10-18 NOTE — PROGRESS NOTES
"Subjective:   Vicenta Saeed is a 67 y.o. female who presents for Dizziness (X 1 week)  This is a pleasant 67-year-old female who presents with dizziness which began this morning abruptly.  This began when she got out of bed.  It has improved slightly.  She has a mild occipital headache.  She denies nausea or vomiting.  No speech or vision changes.  No history of CVA or TIA.  No weakness.  Does endorse recent URI infection with some sinus congestion.  No history of seasonal allergies.  No fever or chills.  No chest pain or shortness of breath.     Dizziness  The current episode started 1 to 4 weeks ago.       Review of Systems   Neurological:  Positive for dizziness.     Medications:  albuterol Aers  Flovent HFA Aero  fluticasone  fluticasone-salmeterol Aepb  omeprazole  vitamin D Tabs  WOMENS MULTIVITAMIN PO    Allergies:             Patient has no known allergies.    Surgical History:       No past surgical history on file.    Past Social Hx:  Vicenta Saeed  reports that she has never smoked. She has never used smokeless tobacco. She reports that she does not currently use alcohol. She reports that she does not use drugs.     Past Family Hx:   Vicenta Saeed family history is not on file.       Problem list, medications, and allergies reviewed by myself today in Epic.     Objective:     /60   Pulse 85   Temp 36.6 °C (97.8 °F) (Temporal)   Resp 16   Ht 1.575 m (5' 2\")   Wt 64.4 kg (142 lb)   LMP  (LMP Unknown)   SpO2 98%   BMI 25.97 kg/m²     Physical Exam  Vitals and nursing note reviewed.   Constitutional:       General: She is not in acute distress.  HENT:      Head: Normocephalic.      Right Ear: Tympanic membrane is retracted. Tympanic membrane has decreased mobility.      Left Ear: Tympanic membrane is retracted. Tympanic membrane has decreased mobility.      Nose: Congestion present.   Eyes:      General: No visual field deficit.     Extraocular Movements: Extraocular movements " intact.      Pupils: Pupils are equal, round, and reactive to light.   Cardiovascular:      Rate and Rhythm: Normal rate and regular rhythm.      Pulses: Normal pulses.   Pulmonary:      Effort: Pulmonary effort is normal. No respiratory distress.   Musculoskeletal:      Cervical back: Normal range of motion and neck supple. No rigidity.   Skin:     General: Skin is warm.      Findings: No rash.   Neurological:      General: No focal deficit present.      Mental Status: She is alert and oriented to person, place, and time. Mental status is at baseline.      GCS: GCS eye subscore is 4. GCS verbal subscore is 5. GCS motor subscore is 6.      Cranial Nerves: No cranial nerve deficit, dysarthria or facial asymmetry.      Motor: Motor function is intact. No tremor, abnormal muscle tone or pronator drift.      Coordination: Coordination is intact. Romberg sign negative. Coordination normal. Finger-Nose-Finger Test normal.      Gait: Gait normal.      Deep Tendon Reflexes: Reflexes are normal and symmetric. Reflexes normal. Babinski sign absent on the right side.   Psychiatric:         Attention and Perception: Attention and perception normal.         Mood and Affect: Mood and affect normal.         Speech: Speech normal.         Behavior: Behavior normal.         Thought Content: Thought content normal.         Cognition and Memory: Cognition normal.       Assessment/Plan:     Diagnosis and Associated Orders:     1. Dizziness  - meclizine (ANTIVERT) 25 MG Tab; Take 1 Tablet by mouth 3 times a day as needed for Dizziness.  Dispense: 30 Tablet; Refill: 0    2. Dysfunction of both eustachian tubes  - meclizine (ANTIVERT) 25 MG Tab; Take 1 Tablet by mouth 3 times a day as needed for Dizziness.  Dispense: 30 Tablet; Refill: 0  - cetirizine (ZYRTEC) 10 MG Tab; Take 1 Tablet by mouth every day.  Dispense: 30 Tablet; Refill: 0  - fluticasone (FLONASE) 50 MCG/ACT nasal spray; Administer 1 Spray into affected nostril(S) every day.   Dispense: 16 g; Refill: 0    3. Tension-type headache, not intractable, unspecified chronicity pattern      Comments/MDM:    Neuro exam is nonfocal.  No history of CVA or TIA.  Vital signs stable and reassuring.  No pronator drift or facial asymmetry.  Some evidence of TM retraction and recent viral URI.  Suspect eustachian tube dysfunction related to recent viral URI.  Headache seems to be in the occipital cervical region and aggravated with cervical spine range of motion.  Discussed red flags for return including increased headache, shortness of breath, syncope or presyncope, nausea, vomiting, increased dizziness, facial droop, slurring, weakness.  Patient and family demonstrate verbal understanding.  Recommend Zyrtec and Flonase for eustachian tube dysfunction, meclizine as needed.  Caution sedation.    Greater than 32 minutes was spent coordinating this patient's care during focal neurologic exam and discussing differential diagnosis which includes CVA, TIA, migraine, benign positional vertigo, central vertigo.    I personally reviewed prior external notes and test results pertinent to today's visit.  Red flags discussed as well as indications to present to the Emergency Department.  Supportive care, natural history, differential diagnoses, and indications for immediate follow-up discussed.  Patient expresses understanding and agrees to plan.  Patient denies any other questions or concerns.    Follow-up with the primary care physician for recheck, reevaluation, and consideration of further management.      Please note that this dictation was created using voice recognition software. I have made a reasonable attempt to correct obvious errors, but I expect that there are errors of grammar and possibly content that I did not discover before finalizing the note.    This note was electronically signed by Varsha Angeles PA-C

## 2022-11-11 ENCOUNTER — PATIENT MESSAGE (OUTPATIENT)
Dept: HEALTH INFORMATION MANAGEMENT | Facility: OTHER | Age: 67
End: 2022-11-11

## 2023-03-02 ENCOUNTER — APPOINTMENT (OUTPATIENT)
Dept: URGENT CARE | Facility: CLINIC | Age: 68
End: 2023-03-02
Payer: MEDICARE

## 2024-05-16 ENCOUNTER — APPOINTMENT (OUTPATIENT)
Dept: URGENT CARE | Facility: CLINIC | Age: 69
End: 2024-05-16
Payer: MEDICARE

## 2024-06-14 NOTE — ASSESSMENT & PLAN NOTE
Sec to poor oral intake and covid, mild  Encourage oral intake  Boost  Cont monitoring   abdominal pain

## 2024-07-09 ENCOUNTER — APPOINTMENT (OUTPATIENT)
Dept: URGENT CARE | Facility: CLINIC | Age: 69
End: 2024-07-09
Payer: MEDICARE

## 2025-02-09 ENCOUNTER — OFFICE VISIT (OUTPATIENT)
Dept: URGENT CARE | Facility: CLINIC | Age: 70
End: 2025-02-09
Payer: MEDICARE

## 2025-02-09 VITALS
SYSTOLIC BLOOD PRESSURE: 110 MMHG | HEART RATE: 97 BPM | BODY MASS INDEX: 27.38 KG/M2 | DIASTOLIC BLOOD PRESSURE: 62 MMHG | RESPIRATION RATE: 18 BRPM | OXYGEN SATURATION: 96 % | HEIGHT: 61 IN | TEMPERATURE: 98.1 F | WEIGHT: 145 LBS

## 2025-02-09 DIAGNOSIS — J06.9 VIRAL URI WITH COUGH: ICD-10-CM

## 2025-02-09 PROCEDURE — 3078F DIAST BP <80 MM HG: CPT | Performed by: PHYSICIAN ASSISTANT

## 2025-02-09 PROCEDURE — 99213 OFFICE O/P EST LOW 20 MIN: CPT | Performed by: PHYSICIAN ASSISTANT

## 2025-02-09 PROCEDURE — 3074F SYST BP LT 130 MM HG: CPT | Performed by: PHYSICIAN ASSISTANT

## 2025-02-09 RX ORDER — BENZONATATE 100 MG/1
100 CAPSULE ORAL 3 TIMES DAILY PRN
Qty: 60 CAPSULE | Refills: 0 | Status: SHIPPED | OUTPATIENT
Start: 2025-02-09

## 2025-02-09 ASSESSMENT — ENCOUNTER SYMPTOMS
COUGH: 1
MYALGIAS: 0
EYE DISCHARGE: 0
NAUSEA: 0
EYE REDNESS: 0
SORE THROAT: 1
VOMITING: 0
WHEEZING: 0
HEADACHES: 0
DIARRHEA: 0
FEVER: 0
SHORTNESS OF BREATH: 0

## 2025-02-09 NOTE — PROGRESS NOTES
Subjective     Vicenta Saeed is a 69 y.o. female who presents with Cough (X5days dry cough )            Cough  This is a new problem. Episode onset: x 5 days ago. The problem has been unchanged. The cough is Non-productive. Associated symptoms include ear congestion and a sore throat. Pertinent negatives include no chest pain, ear pain, eye redness, fever, headaches, myalgias, shortness of breath or wheezing. She has tried OTC cough suppressant (and OTC home remedies) for the symptoms.     PMH:  has no past medical history on file.  MEDS:   Current Outpatient Medications:     benzonatate (TESSALON) 100 MG Cap, Take 1 Capsule by mouth 3 times a day as needed for Cough., Disp: 60 Capsule, Rfl: 0    meclizine (ANTIVERT) 25 MG Tab, Take 1 Tablet by mouth 3 times a day as needed for Dizziness., Disp: 30 Tablet, Rfl: 0    cetirizine (ZYRTEC) 10 MG Tab, Take 1 Tablet by mouth every day., Disp: 30 Tablet, Rfl: 0    fluticasone (FLONASE) 50 MCG/ACT nasal spray, Administer 1 Spray into affected nostril(S) every day., Disp: 16 g, Rfl: 0    fluticasone (FLONASE) 50 MCG/ACT nasal spray, 2 spray(s) (Patient not taking: No sig reported), Disp: , Rfl:     fluticasone-salmeterol (ADVAIR) 100-50 MCG/DOSE AEROSOL POWDER, BREATH ACTIVATED, 1 INH (Patient not taking: No sig reported), Disp: , Rfl:     fluticasone-salmeterol (ADVAIR) 500-50 MCG/DOSE AEROSOL POWDER, BREATH ACTIVATED, 1 puff(s) (Patient not taking: No sig reported), Disp: , Rfl:     omeprazole (PRILOSEC) 20 MG delayed-release capsule, Take 1 Capsule by mouth every day. (Patient not taking: Reported on 10/17/2022), Disp: 30 Capsule, Rfl: 1    albuterol 108 (90 Base) MCG/ACT Aero Soln inhalation aerosol, Inhale 1 Puff every 6 hours as needed for Shortness of Breath. (Patient not taking: Reported on 10/17/2022), Disp: 8.5 g, Rfl: 0    fluticasone (FLOVENT HFA) 44 MCG/ACT Aerosol, Inhale 1 Puff 2 times a day. (Patient not taking: Reported on 10/17/2022), Disp: 1 Each, Rfl:  "2    vitamin D3 (VITAMIND D3) 1000 UNIT Tab, Take 1 Tablet by mouth every day. (Patient not taking: No sig reported), Disp: 30 Tablet, Rfl: 0    Multiple Vitamins-Minerals (WOMENS MULTIVITAMIN PO), Take 1 Tablet by mouth every day. (Patient not taking: No sig reported), Disp: , Rfl:   ALLERGIES: No Known Allergies  SURGHX: History reviewed. No pertinent surgical history.  SOCHX:  reports that she has never smoked. She has never used smokeless tobacco. She reports that she does not currently use alcohol. She reports that she does not use drugs.  FH: Family history was reviewed, no pertinent findings to report    Review of Systems   Constitutional:  Negative for fever.   HENT:  Positive for congestion and sore throat. Negative for ear pain.    Eyes:  Negative for discharge and redness.   Respiratory:  Positive for cough. Negative for shortness of breath and wheezing.    Cardiovascular:  Negative for chest pain.   Gastrointestinal:  Negative for diarrhea, nausea and vomiting.   Musculoskeletal:  Negative for myalgias.   Neurological:  Negative for headaches.              Objective     /62   Pulse 97   Temp 36.7 °C (98.1 °F) (Temporal)   Resp 18   Ht 1.549 m (5' 1\")   Wt 65.8 kg (145 lb)   LMP  (LMP Unknown)   SpO2 96%   BMI 27.40 kg/m²      Physical Exam  Constitutional:       General: She is not in acute distress.     Appearance: Normal appearance. She is not ill-appearing.   HENT:      Head: Normocephalic and atraumatic.      Right Ear: Tympanic membrane, ear canal and external ear normal.      Left Ear: Tympanic membrane, ear canal and external ear normal.      Nose: Nose normal.      Mouth/Throat:      Mouth: Mucous membranes are moist.      Pharynx: Oropharynx is clear. No posterior oropharyngeal erythema.   Eyes:      Extraocular Movements: Extraocular movements intact.      Conjunctiva/sclera: Conjunctivae normal.   Cardiovascular:      Rate and Rhythm: Normal rate and regular rhythm.      Heart " sounds: Normal heart sounds.   Pulmonary:      Effort: Pulmonary effort is normal. No respiratory distress.      Breath sounds: Normal breath sounds. No wheezing.   Musculoskeletal:         General: Normal range of motion.      Cervical back: Normal range of motion and neck supple.   Skin:     General: Skin is warm and dry.   Neurological:      Mental Status: She is alert and oriented to person, place, and time.                             Assessment & Plan        Assessment & Plan  Viral URI with cough    Orders:    benzonatate (TESSALON) 100 MG Cap; Take 1 Capsule by mouth 3 times a day as needed for Cough.    Referral back to PCP    The patient's presenting symptoms and physical exam findings are consistent with a viral URI with associated cough.  The patient's physical exam today in clinic was normal.  The patient's lungs were clear to auscultation without wheezing or rhonchi, and her pulse ox was within normal limits.  The patient is nontoxic and appears in no acute distress.  The patient's vital signs are stable and within normal limits.  She is afebrile today in clinic.  Discussed likely viral etiology with the patient.  Advised the patient to monitor for worsening signs and or symptoms.  Will prescribe patient Tessalon for symptomatic relief of her cough.  Recommend OTC medications and supportive care for symptomatic management.  Recommend patient follow-up with primary care.  The patient states she needs to reestablish with primary care.  Will place a referral back to primary care at this time.  Discussed return precautions with the patient, and she verbalized understanding.        Differential diagnoses, supportive care, and indications for immediate follow-up discussed with patient.   Instructed to return to clinic or nearest emergency department for any change in condition, further concerns, or worsening of symptoms.    OTC Tylenol or Motrin for fever/discomfort.  OTC cough/cold medication for  symptomatic relief  OTC Supportive Care for Congestion - saline nasal spray or neti pot  Drink plenty of fluids  Follow-up with PCP  Return to clinic or go to the ED if symptoms worsen or fail to improve, or if the patient should develop worsening/increasing cough, congestion, ear pain, sore throat, shortness of breath, wheezing, chest pain, fever/chills, and/or any concerning symptoms.    Discussed plan with the patient, and she agrees to the above.     I personally reviewed prior external notes and test results pertinent to today's visit.  I have independently reviewed and interpreted all diagnostics ordered during this urgent care visit.     Please note that this dictation was created using voice recognition software. I have made every reasonable attempt to correct obvious errors, but I expect that there may be errors of grammar and possibly content that I did not discover before finalizing the note.     This note was electronically signed by Alvina Mccullough PA-C

## 2025-03-07 ENCOUNTER — TELEPHONE (OUTPATIENT)
Dept: HEALTH INFORMATION MANAGEMENT | Facility: OTHER | Age: 70
End: 2025-03-07
Payer: MEDICARE

## 2025-03-12 NOTE — ASSESSMENT & PLAN NOTE
Chronic, stable. She maintains on albuterol PRN. After COVID, she has a chronic dry cough. Has had this for many years and sometimes feels like her coughing will cause her to choke. She would like to have this evaluated by a lung specialist. Advise she get established with PCP first and possibly undergo PFT. Will help her get established with PCP today.

## 2025-03-13 ENCOUNTER — OFFICE VISIT (OUTPATIENT)
Dept: FAMILY PLANNING/WOMEN'S HEALTH CLINIC | Facility: PHYSICIAN GROUP | Age: 70
End: 2025-03-13
Payer: MEDICARE

## 2025-03-13 VITALS
HEART RATE: 65 BPM | DIASTOLIC BLOOD PRESSURE: 64 MMHG | BODY MASS INDEX: 27 KG/M2 | OXYGEN SATURATION: 98 % | HEIGHT: 61 IN | SYSTOLIC BLOOD PRESSURE: 102 MMHG | WEIGHT: 143 LBS

## 2025-03-13 DIAGNOSIS — F51.04 PSYCHOPHYSIOLOGICAL INSOMNIA: ICD-10-CM

## 2025-03-13 DIAGNOSIS — Z12.31 SCREENING MAMMOGRAM FOR BREAST CANCER: ICD-10-CM

## 2025-03-13 DIAGNOSIS — Z78.0 POSTMENOPAUSAL: ICD-10-CM

## 2025-03-13 DIAGNOSIS — K59.09 CHRONIC CONSTIPATION: ICD-10-CM

## 2025-03-13 DIAGNOSIS — Z12.12 SCREENING FOR COLORECTAL CANCER: ICD-10-CM

## 2025-03-13 DIAGNOSIS — Z12.11 SCREENING FOR COLORECTAL CANCER: ICD-10-CM

## 2025-03-13 DIAGNOSIS — J45.909 ASTHMA WITHOUT STATUS ASTHMATICUS WITHOUT COMPLICATION, UNSPECIFIED ASTHMA SEVERITY, UNSPECIFIED WHETHER PERSISTENT: ICD-10-CM

## 2025-03-13 PROBLEM — G47.00 INSOMNIA: Status: ACTIVE | Noted: 2025-03-13

## 2025-03-13 PROCEDURE — 3074F SYST BP LT 130 MM HG: CPT

## 2025-03-13 PROCEDURE — 1126F AMNT PAIN NOTED NONE PRSNT: CPT

## 2025-03-13 PROCEDURE — G0439 PPPS, SUBSEQ VISIT: HCPCS

## 2025-03-13 PROCEDURE — 3078F DIAST BP <80 MM HG: CPT

## 2025-03-13 SDOH — ECONOMIC STABILITY: HOUSING INSECURITY: IN THE PAST 12 MONTHS, HOW MANY TIMES HAVE YOU MOVED WHERE YOU WERE LIVING?: 1

## 2025-03-13 SDOH — ECONOMIC STABILITY: TRANSPORTATION INSECURITY: IN THE PAST 12 MONTHS, HAS LACK OF TRANSPORTATION KEPT YOU FROM MEDICAL APPOINTMENTS OR FROM GETTING MEDICATIONS?: NO

## 2025-03-13 SDOH — ECONOMIC STABILITY: FOOD INSECURITY: HOW HARD IS IT FOR YOU TO PAY FOR THE VERY BASICS LIKE FOOD, HOUSING, MEDICAL CARE, AND HEATING?: NOT HARD AT ALL

## 2025-03-13 SDOH — ECONOMIC STABILITY: FOOD INSECURITY: WITHIN THE PAST 12 MONTHS, THE FOOD YOU BOUGHT JUST DIDN'T LAST AND YOU DIDN'T HAVE MONEY TO GET MORE.: NEVER TRUE

## 2025-03-13 SDOH — ECONOMIC STABILITY: HOUSING INSECURITY: IN THE LAST 12 MONTHS, WAS THERE A TIME WHEN YOU WERE NOT ABLE TO PAY THE MORTGAGE OR RENT ON TIME?: NO

## 2025-03-13 SDOH — ECONOMIC STABILITY: HOUSING INSECURITY: AT ANY TIME IN THE PAST 12 MONTHS, WERE YOU HOMELESS OR LIVING IN A SHELTER (INCLUDING NOW)?: NO

## 2025-03-13 SDOH — ECONOMIC STABILITY: FOOD INSECURITY: WITHIN THE PAST 12 MONTHS, YOU WORRIED THAT YOUR FOOD WOULD RUN OUT BEFORE YOU GOT THE MONEY TO BUY MORE.: NEVER TRUE

## 2025-03-13 ASSESSMENT — ENCOUNTER SYMPTOMS: GENERAL WELL-BEING: EXCELLENT

## 2025-03-13 ASSESSMENT — PAIN SCALES - GENERAL: PAINLEVEL_OUTOF10: NO PAIN

## 2025-03-13 ASSESSMENT — ACTIVITIES OF DAILY LIVING (ADL)
BATHING_REQUIRES_ASSISTANCE: 0
LACK_OF_TRANSPORTATION: NO
LACK_OF_TRANSPORTATION: NO

## 2025-03-13 ASSESSMENT — PATIENT HEALTH QUESTIONNAIRE - PHQ9
5. POOR APPETITE OR OVEREATING: 1 - SEVERAL DAYS
SUM OF ALL RESPONSES TO PHQ QUESTIONS 1-9: 8
CLINICAL INTERPRETATION OF PHQ2 SCORE: 3

## 2025-03-13 NOTE — ASSESSMENT & PLAN NOTE
Chronic, stable. She notes a hard time going to sleep because her mind is always racing. Discussed stress reduction techniques such as deep breathing, meditation, good sleep hygiene. Denies depression, though PHQ9 is 8 in office today. Advise follow up with PCP should this continue.

## 2025-03-13 NOTE — ASSESSMENT & PLAN NOTE
Chronic, stable. She takes something over the counter to aide in stool softening. She eats a high fiber diet but notes without the stool softener, she wont use the bathroom for several days. This is not a new problem and has been going on since she was a kid. She has had colonoscopy in the past but it has been 5 years.

## 2025-03-13 NOTE — PROGRESS NOTES
Comprehensive Health Assessment Program     Vicenta Saeed is a 70 y.o. here for her comprehensive health assessment.    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 16 g 0    albuterol 108 (90 Base) MCG/ACT Aero Soln inhalation aerosol Inhale 1 Puff every 6 hours as needed for Shortness of Breath. 8.5 g 0     No current facility-administered medications for this visit.          Current supplements as per medication list.     Allergies:   Patient has no known allergies.  Social History     Tobacco Use    Smoking status: Never    Smokeless tobacco: Never   Vaping Use    Vaping status: Never Used   Substance Use Topics    Alcohol use: Not Currently     Comment: rare    Drug use: Never     History reviewed. No pertinent family history.  Vicenta  has no past medical history on file.   History reviewed. No pertinent surgical history.    Screening:  In the last six months have you experienced any leakage of urine? No    Depression Screening- denies depression  Little interest or pleasure in doing things?  3 - nearly every day  Feeling down, depressed , or hopeless? 0 - not at all  Trouble falling or staying asleep, or sleeping too much?  1 - several days  Feeling tired or having little energy?  1 - several days  Poor appetite or overeating?  1 - several days  Feeling bad about yourself - or that you  are a failure or have let yourself or your family down? 1 - several days  Trouble concentrating on things, such as reading the newspaper or watching television? 1 - several days  Moving or speaking so slowly that other people could have noticed.  Or the opposite - being so fidgety or restless that you have been moving around a lot more than usual?  0 - not at all  Thoughts that you would be better off dead, or of hurting yourself?  0 - not at all  Patient Health Questionnaire Score: 8    If depressive symptoms identified deferred to follow up visit unless specifically addressed in assessment and plan.    Interpretation of PHQ-9 Total Score   Score Severity   1-4 No Depression   5-9 Mild Depression   10-14 Moderate Depression   15-19 Moderately Severe Depression   20-27 Severe Depression    Screening for Cognitive Impairment  Do you or any of your friends or family members have any concern about your memory? No  Three Minute Recall (Village, Kitchen, Baby) 2/3    Alfredo clock face with all 12 numbers and set the hands to show 10 minutes past 11.  Yes    Cognitive concerns identified deferred for follow up unless specifically addressed in assessment and plan.    Fall Risk Assessment  Has the patient had two or more falls in the last year or any fall with injury in the last year?  No    Safety Assessment  Do you always wear your seatbelt?  Yes  Any changes to home needed to function safely? No  Difficulty hearing.  No  Patient counseled about all safety risks that were identified.    Functional Assessment ADLs  Are there any barriers preventing you from cooking for yourself or meeting nutritional needs?  No.    Are there any barriers preventing you from driving safely or obtaining transportation?  No.    Are there any barriers preventing you from using a telephone or calling for help?  No    Are there any barriers preventing you from shopping?  No.    Are there any barriers preventing you from taking care of your own  finances?  No    Are there any barriers preventing you from managing your medications?  No    Are there any barriers preventing you from showering, bathing or dressing yourself? No    Are there any barriers preventing you from doing housework or laundry? No    Are there any barriers preventing you from using the toilet?No    Are you currently engaging in any exercise or physical activity?  No.  She lifts weights, she walks 20 minutes 3x week.     Self-Assessment of Health  What is your perception of your health? Excellent    Do you sleep more than six hours a night? No    In the past 7 days, how much did pain keep you from doing your normal work? None    Do you spend quality time with family or friends (virtually or in person)? Yes    Do you usually eat a heart healthy diet that constists of a variety of fruits, vegetables, whole grains and fiber? Yes    Do you eat foods high in fat and/or Fast Food more than three times per week? No    How concerned are you that your medical conditions are not being well managed? Not at all    Are you worried that in the next 2 months, you may not have stable housing that you own, rent, or stay in as part of a household? No        Advance Care Planning  Do you have an Advance Directive, Living Will, Durable Power of , or POLST? No                 Health Maintenance Summary            Needs Review       Hepatitis C Screening  Tentatively Complete      11/03/2021  Hepatitis C Antibody component of HEPATITIS PANEL ACUTE(4 COMPONENTS)                      Awaiting Completion       Bone Density Scan (Every 5 Years) Order placed this encounter      03/13/2025  Order placed for DS-BONE DENSITY STUDY (DEXA) by Marcia Bateman P.A.-C.              Mammogram (Yearly) Order placed this encounter      03/13/2025  Order placed for MA-SCREENING MAMMO BILAT W/TOMOSYNTHESIS W/CAD by Marcia Bateman P.A.-C.              Colorectal Cancer Screening (View Topic Details) Order placed this  encounter      03/13/2025  Order placed for Referral to GI for Colonoscopy by Marcia Bateman P.A.-C.                      Upcoming       Zoster (Shingles) Vaccines (1 of 2) Postponed until 8/13/2025     No completion history exists for this topic.              Influenza Vaccine (1) Postponed until 3/13/2026     No completion history exists for this topic.              IMM DTaP/Tdap/Td Vaccine (1 - Tdap) Postponed until 3/13/2026     No completion history exists for this topic.              COVID-19 Vaccine (1 - 2024-25 season) Postponed until 3/13/2026     No completion history exists for this topic.              Pneumococcal Vaccine: 50+ Years (1 of 2 - PCV) Postponed until 3/13/2026     No completion history exists for this topic.              Annual Wellness Visit (Yearly) Next due on 3/13/2026      03/13/2025  Level of Service: [place] ANNUAL WELLNESS VISIT-INCLUDES PPPS SUBSEQUE*                      Completed or No Longer Recommended       Hepatitis A Vaccine (Hep A) (Series Information) Aged Out     No completion history exists for this topic.              Hepatitis B Vaccine (Hep B) (Series Information) Aged Out     No completion history exists for this topic.              HPV Vaccines (Series Information) Aged Out     No completion history exists for this topic.              Polio Vaccine (Inactivated Polio) (Series Information) Aged Out     No completion history exists for this topic.              Meningococcal Immunization (Series Information) Aged Out     No completion history exists for this topic.                            Patient Care Team:  Gage Velazquez M.D. as PCP - General (Family Medicine)    Financial Resource Strain: Low Risk  (3/13/2025)    Overall Financial Resource Strain (CARDIA)     Difficulty of Paying Living Expenses: Not hard at all      Transportation Needs: No Transportation Needs (3/13/2025)    PRAPARE - Transportation     Lack of Transportation (Medical): No     Lack of  "Transportation (Non-Medical): No      Food Insecurity: No Food Insecurity (3/13/2025)    Hunger Vital Sign     Worried About Running Out of Food in the Last Year: Never true     Ran Out of Food in the Last Year: Never true        Encounter Vitals  Blood Pressure : 102/64  Pulse: 65  Pulse Oximetry: 98 %  Weight: 64.9 kg (143 lb)  Height: 154.9 cm (5' 1\")  BMI (Calculated): 27.02  Pain Score: No pain     Physical Exam:  Constitutional: NAD  HENMT: NC/AT, EOMI  Cardiovascular: RRR, No m/r/g  Lungs: CTAB, no w/r/r  Extremities: No c/c/e  Neurologic: Alert & oriented x3, CN II-XII grossly intact    Assessment and Plan. The following treatment and monitoring plan is recommended:  Asthma without status asthmaticus  Chronic, stable. She maintains on albuterol PRN. After COVID, she has a chronic dry cough. Has had this for many years and sometimes feels like her coughing will cause her to choke. She would like to have this evaluated by a lung specialist. Advise she get established with PCP first and possibly undergo PFT. Will help her get established with PCP today.      Chronic constipation  Chronic, stable. She takes something over the counter to aide in stool softening. She eats a high fiber diet but notes without the stool softener, she wont use the bathroom for several days. This is not a new problem and has been going on since she was a kid. She has had colonoscopy in the past but it has been 5 years.     Insomnia  Chronic, stable. She notes a hard time going to sleep because her mind is always racing. Discussed stress reduction techniques such as deep breathing, meditation, good sleep hygiene. Denies depression, though PHQ9 is 8 in office today. Advise follow up with PCP should this continue.     Screening mammogram for breast cancer  She is due for mammogram. She does not like doing mammograms so it has been several years since her last one. She is amenable to doing one now to get an update. Will order for her " today.    Screening for colorectal cancer  She is due for colonoscopy. Her last one was 5-6 years ago in [place]. States she was told she should have them every 5 years due to polyps. Will place referral to GI today.     Postmenopausal  She is due for DEXA scan. Will order for her at today's visit.     Services suggested: No services needed at this time  Health Care Screening: Age-appropriate preventive services recommended by USPTF and ACIP covered by Medicare were discussed today. Services ordered if indicated and agreed upon by the patient.  Referrals offered: Community-based lifestyle interventions to reduce health risks and promote self-management and wellness, fall prevention, nutrition, physical activity, tobacco-use cessation, weight loss, and mental health services as per orders if indicated.    Discussion today about general wellness and lifestyle habits:    Prevent falls and reduce trip hazards; Cautioned about securing or removing rugs.  Have a working fire alarm and carbon monoxide detector.  Engage in regular physical activity and social activities.    Follow-up: Return for appointment with Primary Care Provider as needed..

## 2025-03-31 ENCOUNTER — OFFICE VISIT (OUTPATIENT)
Dept: MEDICAL GROUP | Facility: CLINIC | Age: 70
End: 2025-03-31
Payer: MEDICARE

## 2025-03-31 VITALS
HEIGHT: 62 IN | WEIGHT: 143.6 LBS | SYSTOLIC BLOOD PRESSURE: 98 MMHG | DIASTOLIC BLOOD PRESSURE: 68 MMHG | BODY MASS INDEX: 26.43 KG/M2 | OXYGEN SATURATION: 93 % | HEART RATE: 72 BPM | TEMPERATURE: 98.2 F

## 2025-03-31 DIAGNOSIS — R05.3 POST-COVID CHRONIC COUGH: ICD-10-CM

## 2025-03-31 DIAGNOSIS — Z00.00 HEALTHCARE MAINTENANCE: ICD-10-CM

## 2025-03-31 DIAGNOSIS — U09.9 POST-COVID CHRONIC COUGH: ICD-10-CM

## 2025-03-31 NOTE — ASSESSMENT & PLAN NOTE
Cough since 2021.  Has albuterol but does not feel helpful.  She did have oral prednisone at 1 time it seemed to work quite well.  Does have some shortness of breath occasionally seems to be unrelated to the cough.  Is nonproductive.  Will try inhaled steroids for approximately 1 month.  She will return at that time.  Consider spirometry and/or chest x-ray.

## 2025-03-31 NOTE — PROGRESS NOTES
Subjective:     CC: Chronic cough, question about health maintenance labs ordered.    HPI:   Vicenta presents today with the above problems.  She has had a chronic cough since her COVID infection in 2023.  She has been using albuterol inhaler with very minimal effect.  Wondering if there is something better for her.  She does state that she had short course of steroids in the past.  This seemed to help her a lot.  Her cough is nonproductive.  She has not had a follow-up chest x-ray.  She is also here questioning about her maintenance labs that were ordered.  Had both a mammogram and DEXA scan ordered however she does not know how to get to them.    Problem   Post-Covid Chronic Cough    Last visit RXd inhalers she already had access to  Ok when sitting, but with exertion, or needs liquid to not cough, somewhat improve  Using teas, cough medicine    X3 times a day  Waking up couging, 2x a night  Dry cough, clear mucous, inimal    Cough attacks     Healthcare Maintenance       Current Outpatient Medications Ordered in Epic   Medication Sig Dispense Refill    beclomethasone HFA (QVAR REDIHALER) 40 MCG/ACT inhaler Inhale 1 Puff 2 times a day. 1 Each 11    albuterol 108 (90 Base) MCG/ACT Aero Soln inhalation aerosol Inhale 1 Puff every 6 hours as needed for Shortness of Breath. 8.5 g 0    fluticasone (FLONASE) 50 MCG/ACT nasal spray Administer 1 Spray into affected nostril(S) every day. (Patient not taking: Reported on 3/31/2025) 16 g 0     No current Epic-ordered facility-administered medications on file.       Health Maintenance:     ROS:  Gen: no fevers/chills, no changes in weight  Eyes: no changes in vision  ENT: no sore throat, no hearing loss, no bloody nose  Pulm: no sob, no cough  CV: no chest pain, no palpitations  GI: no nausea/vomiting, no diarrhea  : no dysuria  MSk: no myalgias  Skin: no rash  Neuro: no headaches, no numbness/tingling  Heme/Lymph: no easy bruising      Objective:     Exam:  BP 98/68 (BP  "Location: Left arm, Patient Position: Sitting, BP Cuff Size: Adult)   Pulse 72   Temp 36.8 °C (98.2 °F) (Temporal)   Ht 1.562 m (5' 1.5\")   Wt 65.1 kg (143 lb 9.6 oz)   LMP  (LMP Unknown)   SpO2 93%   BMI 26.69 kg/m²  Body mass index is 26.69 kg/m².    Gen: Alert and oriented, No apparent distress.  Neck: Neck is supple without lymphadenopathy.  Lungs: Normal effort, CTA bilaterally, no wheezes, rhonchi, or rales  CV: Regular rate and rhythm. No murmurs, rubs, or gallops.  Ext: No clubbing, cyanosis, edema.      Labs: none    Assessment & Plan:     70 y.o. female with the following -     Problem List Items Addressed This Visit       Healthcare maintenance    Has mammogram and dexa scan ordered.  But patient confused.          Post-COVID chronic cough    Cough since 2021.  Has albuterol but does not feel helpful.                  No follow-ups on file.    Please note that this dictation was created using voice recognition software. I have made every reasonable attempt to correct obvious errors, but I expect that there are errors of grammar and possibly content that I did not discover before finalizing the note.        "

## 2025-04-16 ENCOUNTER — HOSPITAL ENCOUNTER (OUTPATIENT)
Dept: RADIOLOGY | Facility: MEDICAL CENTER | Age: 70
End: 2025-04-16
Payer: MEDICARE

## 2025-04-16 DIAGNOSIS — Z78.0 POSTMENOPAUSAL: ICD-10-CM

## 2025-04-16 DIAGNOSIS — Z12.31 SCREENING MAMMOGRAM FOR BREAST CANCER: ICD-10-CM

## 2025-04-16 PROCEDURE — 77067 SCR MAMMO BI INCL CAD: CPT

## 2025-04-16 PROCEDURE — 77080 DXA BONE DENSITY AXIAL: CPT

## 2025-04-18 ENCOUNTER — RESULTS FOLLOW-UP (OUTPATIENT)
Dept: FAMILY PLANNING/WOMEN'S HEALTH CLINIC | Facility: PHYSICIAN GROUP | Age: 70
End: 2025-04-18

## 2025-04-23 ENCOUNTER — RESULTS FOLLOW-UP (OUTPATIENT)
Dept: FAMILY PLANNING/WOMEN'S HEALTH CLINIC | Facility: PHYSICIAN GROUP | Age: 70
End: 2025-04-23

## 2025-04-30 ENCOUNTER — OFFICE VISIT (OUTPATIENT)
Dept: MEDICAL GROUP | Facility: CLINIC | Age: 70
End: 2025-04-30
Payer: MEDICARE

## 2025-04-30 VITALS
BODY MASS INDEX: 27.03 KG/M2 | TEMPERATURE: 98.2 F | HEART RATE: 75 BPM | WEIGHT: 143.2 LBS | OXYGEN SATURATION: 96 % | HEIGHT: 61 IN

## 2025-04-30 DIAGNOSIS — S40.261A INSECT BITE OF RIGHT SHOULDER, INITIAL ENCOUNTER: ICD-10-CM

## 2025-04-30 DIAGNOSIS — G89.29 CHRONIC BILATERAL LOW BACK PAIN WITHOUT SCIATICA: ICD-10-CM

## 2025-04-30 DIAGNOSIS — M81.0 AGE-RELATED OSTEOPOROSIS WITHOUT CURRENT PATHOLOGICAL FRACTURE: ICD-10-CM

## 2025-04-30 DIAGNOSIS — M54.50 CHRONIC BILATERAL LOW BACK PAIN WITHOUT SCIATICA: ICD-10-CM
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
## 2025-04-30 DIAGNOSIS — M25.571 CHRONIC PAIN OF RIGHT ANKLE: ICD-10-CM

## 2025-04-30 DIAGNOSIS — R05.3 POST-COVID CHRONIC COUGH: ICD-10-CM

## 2025-04-30 DIAGNOSIS — G89.29 CHRONIC PAIN OF RIGHT ANKLE: ICD-10-CM

## 2025-04-30 DIAGNOSIS — U09.9 POST-COVID CHRONIC COUGH: ICD-10-CM

## 2025-04-30 DIAGNOSIS — R05.3 CHRONIC COUGH: ICD-10-CM

## 2025-04-30 RX ORDER — ALENDRONATE SODIUM 70 MG/1
70 TABLET ORAL
Qty: 12 TABLET | Refills: 3 | Status: SHIPPED | OUTPATIENT
Start: 2025-04-30

## 2025-04-30 NOTE — ASSESSMENT & PLAN NOTE
"Has had a lot of stress lately.  When she does her back hurts more.  \"Goes out\" when bends over.  Has been an issue since a child when fell off horse.  Not able to elicit pain on exam.  Neuro exam appears to be normal.  P: Will do stretching exercises.  NSAIDs if needed.  Will start treating her osteoporosis and maybe that will help her back pain as well.  "

## 2025-04-30 NOTE — ASSESSMENT & PLAN NOTE
Injury 15 years ago.  Now swells at times and painful. Feels like pointing the wrong way.  Shakes her foot with some relief.  No pain elicited on exam.  In alignment appears to be normal.  No effusion.  Plan: Will continue ankle strengthening exercises will use NSAIDs when needed.  In addition [facility].

## 2025-04-30 NOTE — PROGRESS NOTES
"Subjective:     CC: Follow-up after labs.  Back pain right ankle pain and skin itching    HPI:   Vicenta presents today with the above problems.  She just had a DEXA scan which showed her T-score to be -2.5 in the spine.  She does have back pain as well.  She does not have any sciatica.  She had a injury when she was a child falling off a horse.  Her back has been a problem ever since then.  She bends over and it \"goes out \".    She also describes going out at night and getting multiple insect bites.  She has no bites to show me at this time.  She uses a combination of peppermint oil and lavender and that seems to help.    She also has right ankle pain.  This started approximately 15 years ago.  She states that it looks like it is going the wrong direction.  There is no swelling noted.  But it does cause discomfort every now and then.    Problem   Chronic Pain of Right Ankle   Chronic Bilateral Low Back Pain Without Sciatica   Age-Related Osteoporosis Without Current Pathological Fracture   Insect Bite of Right Shoulder    Using peppermint and lavender oil and helps.  Otherwise seems to get bites every night.  Probably infestational delusion.       Cough   Post-Covid Chronic Cough    Last visit RXd inhalers she already had access to  Ok when sitting, but with exertion, or needs liquid to not cough, somewhat improve  Using teas, cough medicine    X3 times a day  Waking up couging, 2x a night  Dry cough, clear mucous, inimal    Cough attacks         Current Outpatient Medications Ordered in Epic   Medication Sig Dispense Refill    alendronate (FOSAMAX) 70 MG Tab Take 1 Tablet by mouth every 7 days. 12 Tablet 3    albuterol 108 (90 Base) MCG/ACT Aero Soln inhalation aerosol Inhale 1 Puff every 6 hours as needed for Shortness of Breath. 8.5 g 0    beclomethasone HFA (QVAR REDIHALER) 40 MCG/ACT inhaler Inhale 1 Puff 2 times a day. (Patient not taking: Reported on 4/30/2025) 1 Each 11    fluticasone (FLONASE) 50 MCG/ACT " "nasal spray Administer 1 Spray into affected nostril(S) every day. (Patient not taking: Reported on 3/31/2025) 16 g 0     No current Epic-ordered facility-administered medications on file.       Health Maintenance:     ROS:  Gen: no fevers/chills, no changes in weight  Eyes: no changes in vision  ENT: no sore throat, no hearing loss, no bloody nose  Pulm: no sob, no cough  CV: no chest pain, no palpitations  GI: no nausea/vomiting, no diarrhea  : no dysuria  MSk: no myalgias  Skin: no rash  Neuro: no headaches, no numbness/tingling  Heme/Lymph: no easy bruising      Objective:     Exam:  Pulse 75   Temp 36.8 °C (98.2 °F) (Temporal)   Ht 1.549 m (5' 1\")   Wt 65 kg (143 lb 3.2 oz)   LMP  (LMP Unknown)   SpO2 96%   BMI 27.06 kg/m²  Body mass index is 27.06 kg/m².    Gen: Alert and oriented, No apparent distress.  Neck: Neck is supple without lymphadenopathy.  Lungs: Normal effort, CTA bilaterally, no wheezes, rhonchi, or rales  CV: Regular rate and rhythm. No murmurs, rubs, or gallops.  Ext: No clubbing, cyanosis, edema.      Labs: DEXA scan -2.5 T-score    Assessment & Plan:     70 y.o. female with the following -     Problem List Items Addressed This Visit          Family Medicine Problems    Chronic pain of right ankle    Injury 15 years ago.  Now swells at times and painful. Feels like pointing the wrong way.  Shakes her foot with some relief.  No pain elicited on exam.  In alignment appears to be normal.  No effusion.  Plan: Will continue ankle strengthening exercises will use NSAIDs when needed.  In addition we will get [facility] brace [facility] the [facility] for support.         Chronic bilateral low back pain without sciatica    Has had a lot of stress lately.  When she does her back hurts more.  \"Goes out\" when bends over.  Has been an issue since a child when fell off horse.  Not able to elicit pain on exam.  Neuro exam appears to be normal.  P: Will do stretching exercises.  NSAIDs if needed.  Will start " treating her osteoporosis and maybe that will help her back pain as well.            Other    Post-COVID chronic cough    Cough responded well to steroid inhaler.  Suggest using it daily.  Use albuterol inhaler if need be..         Cough    Qvar inhaler helpful.  Would like to continue to use once a day.         Age-related osteoporosis without current pathological fracture    Her DEXA scan shows -2.5 on the T-score.  P: We will start with alendronate 70 mg weekly.  Continue weightbearing exercise.  Repeat DEXA scan in 2 years.         Insect bite of right shoulder           Return in about 3 months (around 7/30/2025) for skin itch, back pain.    Please note that this dictation was created using voice recognition software. I have made every reasonable attempt to correct obvious errors, but I expect that there are errors of grammar and possibly content that I did not discover before finalizing the note.

## 2025-04-30 NOTE — ASSESSMENT & PLAN NOTE
Her DEXA scan shows -2.5 on the T-score.  P: We will start with alendronate 70 mg weekly.  Continue weightbearing exercise.  Repeat DEXA scan in 2 years.

## 2025-04-30 NOTE — ASSESSMENT & PLAN NOTE
Cough responded well to steroid inhaler.  Suggest using it daily.  Use albuterol inhaler if need be..

## 2025-05-09 ENCOUNTER — OFFICE VISIT (OUTPATIENT)
Dept: MEDICAL GROUP | Facility: MEDICAL CENTER | Age: 70
End: 2025-05-09
Payer: MEDICARE

## 2025-05-09 VITALS
BODY MASS INDEX: 26.58 KG/M2 | WEIGHT: 140.76 LBS | SYSTOLIC BLOOD PRESSURE: 90 MMHG | HEIGHT: 61 IN | TEMPERATURE: 98.8 F | DIASTOLIC BLOOD PRESSURE: 60 MMHG | HEART RATE: 81 BPM | OXYGEN SATURATION: 96 %

## 2025-05-09 DIAGNOSIS — K59.09 CHRONIC CONSTIPATION: ICD-10-CM

## 2025-05-09 DIAGNOSIS — L29.9 PRURITUS: ICD-10-CM

## 2025-05-09 DIAGNOSIS — J30.9 ALLERGIC RHINITIS, UNSPECIFIED SEASONALITY, UNSPECIFIED TRIGGER: ICD-10-CM

## 2025-05-09 DIAGNOSIS — K59.00 CONSTIPATION, UNSPECIFIED CONSTIPATION TYPE: ICD-10-CM

## 2025-05-09 DIAGNOSIS — J45.909 ASTHMA WITHOUT STATUS ASTHMATICUS WITHOUT COMPLICATION, UNSPECIFIED ASTHMA SEVERITY, UNSPECIFIED WHETHER PERSISTENT: ICD-10-CM

## 2025-05-09 DIAGNOSIS — E78.5 HYPERLIPIDEMIA, UNSPECIFIED HYPERLIPIDEMIA TYPE: ICD-10-CM

## 2025-05-09 DIAGNOSIS — R23.8 DRY SCALP: ICD-10-CM

## 2025-05-09 DIAGNOSIS — G89.29 CHRONIC BILATERAL LOW BACK PAIN WITHOUT SCIATICA: ICD-10-CM

## 2025-05-09 DIAGNOSIS — M79.671 PAIN IN RIGHT FOOT: ICD-10-CM

## 2025-05-09 DIAGNOSIS — M25.571 CHRONIC PAIN OF RIGHT ANKLE: ICD-10-CM

## 2025-05-09 DIAGNOSIS — M54.50 CHRONIC BILATERAL LOW BACK PAIN WITHOUT SCIATICA: ICD-10-CM

## 2025-05-09 DIAGNOSIS — J30.2 SEASONAL ALLERGIES: ICD-10-CM

## 2025-05-09 DIAGNOSIS — G47.00 INSOMNIA, UNSPECIFIED TYPE: ICD-10-CM

## 2025-05-09 DIAGNOSIS — G89.29 CHRONIC PAIN OF RIGHT ANKLE: ICD-10-CM

## 2025-05-09 DIAGNOSIS — Z76.89 ENCOUNTER TO ESTABLISH CARE: ICD-10-CM

## 2025-05-09 DIAGNOSIS — M81.0 AGE-RELATED OSTEOPOROSIS WITHOUT CURRENT PATHOLOGICAL FRACTURE: ICD-10-CM

## 2025-05-09 DIAGNOSIS — F51.04 PSYCHOPHYSIOLOGICAL INSOMNIA: ICD-10-CM

## 2025-05-09 PROBLEM — U07.1 ACUTE HYPOXEMIC RESPIRATORY FAILURE DUE TO COVID-19 (HCC): Status: RESOLVED | Noted: 2021-10-30 | Resolved: 2025-05-09

## 2025-05-09 PROBLEM — S40.261A INSECT BITE OF RIGHT SHOULDER: Status: RESOLVED | Noted: 2025-04-30 | Resolved: 2025-05-09

## 2025-05-09 PROBLEM — E87.1 HYPONATREMIA: Status: RESOLVED | Noted: 2021-11-08 | Resolved: 2025-05-09

## 2025-05-09 PROBLEM — E86.1 HYPOTENSION DUE TO HYPOVOLEMIA: Status: RESOLVED | Noted: 2021-10-30 | Resolved: 2025-05-09

## 2025-05-09 PROBLEM — R06.02 SHORTNESS OF BREATH: Status: RESOLVED | Noted: 2021-12-27 | Resolved: 2025-05-09

## 2025-05-09 PROBLEM — J96.01 ACUTE HYPOXEMIC RESPIRATORY FAILURE DUE TO COVID-19 (HCC): Status: RESOLVED | Noted: 2021-10-30 | Resolved: 2025-05-09

## 2025-05-09 PROBLEM — Z00.00 HEALTHCARE MAINTENANCE: Status: RESOLVED | Noted: 2021-11-03 | Resolved: 2025-05-09
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
## 2025-05-09 PROBLEM — R05.3 POST-COVID CHRONIC COUGH: Status: RESOLVED | Noted: 2022-03-08 | Resolved: 2025-05-09

## 2025-05-09 PROBLEM — U09.9 POST-COVID CHRONIC COUGH: Status: RESOLVED | Noted: 2022-03-08 | Resolved: 2025-05-09

## 2025-05-09 PROBLEM — M25.511 SHOULDER PAIN, RIGHT: Status: RESOLVED | Noted: 2022-01-19 | Resolved: 2025-05-09

## 2025-05-09 PROCEDURE — 3074F SYST BP LT 130 MM HG: CPT | Performed by: NURSE PRACTITIONER

## 2025-05-09 PROCEDURE — 3078F DIAST BP <80 MM HG: CPT | Performed by: NURSE PRACTITIONER

## 2025-05-09 PROCEDURE — 99214 OFFICE O/P EST MOD 30 MIN: CPT | Performed by: NURSE PRACTITIONER

## 2025-05-09 RX ORDER — FLUTICASONE PROPIONATE 50 MCG
1 SPRAY, SUSPENSION (ML) NASAL DAILY
Qty: 16 G | Refills: 0 | Status: SHIPPED | OUTPATIENT
Start: 2025-05-09

## 2025-05-09 RX ORDER — CLOBETASOL PROPIONATE 0.05 G/100ML
1 SHAMPOO TOPICAL NIGHTLY PRN
Qty: 118 ML | Refills: 1 | Status: SHIPPED | OUTPATIENT
Start: 2025-05-09

## 2025-05-09 RX ORDER — HYDROXYZINE HYDROCHLORIDE 10 MG/1
10 TABLET, FILM COATED ORAL 3 TIMES DAILY PRN
Qty: 90 TABLET | Refills: 0 | Status: SHIPPED | OUTPATIENT
Start: 2025-05-09

## 2025-05-09 SDOH — HEALTH STABILITY: MENTAL HEALTH
STRESS IS WHEN SOMEONE FEELS TENSE, NERVOUS, ANXIOUS, OR CAN'T SLEEP AT NIGHT BECAUSE THEIR MIND IS TROUBLED. HOW STRESSED ARE YOU?: TO SOME EXTENT

## 2025-05-09 SDOH — HEALTH STABILITY: PHYSICAL HEALTH: ON AVERAGE, HOW MANY MINUTES DO YOU ENGAGE IN EXERCISE AT THIS LEVEL?: 30 MIN

## 2025-05-09 SDOH — ECONOMIC STABILITY: FOOD INSECURITY: WITHIN THE PAST 12 MONTHS, THE FOOD YOU BOUGHT JUST DIDN'T LAST AND YOU DIDN'T HAVE MONEY TO GET MORE.: NEVER TRUE

## 2025-05-09 SDOH — ECONOMIC STABILITY: INCOME INSECURITY: IN THE LAST 12 MONTHS, WAS THERE A TIME WHEN YOU WERE NOT ABLE TO PAY THE MORTGAGE OR RENT ON TIME?: NO

## 2025-05-09 SDOH — ECONOMIC STABILITY: FOOD INSECURITY: WITHIN THE PAST 12 MONTHS, YOU WORRIED THAT YOUR FOOD WOULD RUN OUT BEFORE YOU GOT MONEY TO BUY MORE.: NEVER TRUE

## 2025-05-09 SDOH — HEALTH STABILITY: PHYSICAL HEALTH: ON AVERAGE, HOW MANY DAYS PER WEEK DO YOU ENGAGE IN MODERATE TO STRENUOUS EXERCISE (LIKE A BRISK WALK)?: 3 DAYS

## 2025-05-09 SDOH — ECONOMIC STABILITY: TRANSPORTATION INSECURITY
IN THE PAST 12 MONTHS, HAS THE LACK OF TRANSPORTATION KEPT YOU FROM MEDICAL APPOINTMENTS OR FROM GETTING MEDICATIONS?: NO

## 2025-05-09 SDOH — ECONOMIC STABILITY: HOUSING INSECURITY
IN THE LAST 12 MONTHS, WAS THERE A TIME WHEN YOU DID NOT HAVE A STEADY PLACE TO SLEEP OR SLEPT IN A SHELTER (INCLUDING NOW)?: NO

## 2025-05-09 SDOH — ECONOMIC STABILITY: INCOME INSECURITY: HOW HARD IS IT FOR YOU TO PAY FOR THE VERY BASICS LIKE FOOD, HOUSING, MEDICAL CARE, AND HEATING?: NOT HARD AT ALL

## 2025-05-09 SDOH — ECONOMIC STABILITY: TRANSPORTATION INSECURITY
IN THE PAST 12 MONTHS, HAS LACK OF TRANSPORTATION KEPT YOU FROM MEETINGS, WORK, OR FROM GETTING THINGS NEEDED FOR DAILY LIVING?: NO

## 2025-05-09 SDOH — ECONOMIC STABILITY: TRANSPORTATION INSECURITY
IN THE PAST 12 MONTHS, HAS LACK OF RELIABLE TRANSPORTATION KEPT YOU FROM MEDICAL APPOINTMENTS, MEETINGS, WORK OR FROM GETTING THINGS NEEDED FOR DAILY LIVING?: NO

## 2025-05-09 ASSESSMENT — SOCIAL DETERMINANTS OF HEALTH (SDOH)
IN A TYPICAL WEEK, HOW MANY TIMES DO YOU TALK ON THE PHONE WITH FAMILY, FRIENDS, OR NEIGHBORS?: MORE THAN THREE TIMES A WEEK
HOW HARD IS IT FOR YOU TO PAY FOR THE VERY BASICS LIKE FOOD, HOUSING, MEDICAL CARE, AND HEATING?: NOT HARD AT ALL
HOW OFTEN DO YOU ATTEND CHURCH OR RELIGIOUS SERVICES?: MORE THAN 4 TIMES PER YEAR
IN A TYPICAL WEEK, HOW MANY TIMES DO YOU TALK ON THE PHONE WITH FAMILY, FRIENDS, OR NEIGHBORS?: MORE THAN THREE TIMES A WEEK
HOW OFTEN DO YOU ATTENT MEETINGS OF THE CLUB OR ORGANIZATION YOU BELONG TO?: MORE THAN 4 TIMES PER YEAR
WITHIN THE PAST 12 MONTHS, YOU WORRIED THAT YOUR FOOD WOULD RUN OUT BEFORE YOU GOT THE MONEY TO BUY MORE: NEVER TRUE
HOW MANY DRINKS CONTAINING ALCOHOL DO YOU HAVE ON A TYPICAL DAY WHEN YOU ARE DRINKING: 1 OR 2
HOW OFTEN DO YOU GET TOGETHER WITH FRIENDS OR RELATIVES?: ONCE A WEEK
HOW OFTEN DO YOU ATTENT MEETINGS OF THE CLUB OR ORGANIZATION YOU BELONG TO?: MORE THAN 4 TIMES PER YEAR
DO YOU BELONG TO ANY CLUBS OR ORGANIZATIONS SUCH AS CHURCH GROUPS UNIONS, FRATERNAL OR ATHLETIC GROUPS, OR SCHOOL GROUPS?: YES
HOW OFTEN DO YOU HAVE A DRINK CONTAINING ALCOHOL: PATIENT DECLINED
IN THE PAST 12 MONTHS, HAS THE ELECTRIC, GAS, OIL, OR WATER COMPANY THREATENED TO SHUT OFF SERVICE IN YOUR HOME?: NO
HOW OFTEN DO YOU HAVE SIX OR MORE DRINKS ON ONE OCCASION: PATIENT DECLINED
HOW OFTEN DO YOU ATTEND CHURCH OR RELIGIOUS SERVICES?: MORE THAN 4 TIMES PER YEAR
HOW OFTEN DO YOU GET TOGETHER WITH FRIENDS OR RELATIVES?: ONCE A WEEK
DO YOU BELONG TO ANY CLUBS OR ORGANIZATIONS SUCH AS CHURCH GROUPS UNIONS, FRATERNAL OR ATHLETIC GROUPS, OR SCHOOL GROUPS?: YES

## 2025-05-09 ASSESSMENT — ENCOUNTER SYMPTOMS
COUGH: 0
ABDOMINAL PAIN: 0
HEADACHES: 0
EYE PAIN: 0
SENSORY CHANGE: 0
EYE DISCHARGE: 0
SPEECH CHANGE: 0
DIARRHEA: 0
BLOOD IN STOOL: 0
FLANK PAIN: 0
DEPRESSION: 0
FOCAL WEAKNESS: 0
POLYDIPSIA: 0
BRUISES/BLEEDS EASILY: 0
NERVOUS/ANXIOUS: 0
VOMITING: 0
TREMORS: 0
DIZZINESS: 0
INSOMNIA: 0
SINUS PAIN: 0
SORE THROAT: 0
SHORTNESS OF BREATH: 0
WHEEZING: 0
SPUTUM PRODUCTION: 0
MYALGIAS: 0
PALPITATIONS: 0
BACK PAIN: 0
FEVER: 0
WEAKNESS: 0
CHILLS: 0
CONSTIPATION: 0
WEIGHT LOSS: 0
EYE REDNESS: 0
NAUSEA: 0
LOSS OF CONSCIOUSNESS: 0

## 2025-05-09 ASSESSMENT — LIFESTYLE VARIABLES
AUDIT-C TOTAL SCORE: -1
HOW MANY STANDARD DRINKS CONTAINING ALCOHOL DO YOU HAVE ON A TYPICAL DAY: 1 OR 2
HOW OFTEN DO YOU HAVE A DRINK CONTAINING ALCOHOL: PATIENT DECLINED
SKIP TO QUESTIONS 9-10: 0
HOW OFTEN DO YOU HAVE SIX OR MORE DRINKS ON ONE OCCASION: PATIENT DECLINED

## 2025-05-09 NOTE — PROGRESS NOTES
Patient agreed to use of EVELINE: yes  Chief Complaint   Patient presents with    Establish Care       HISTORY OF PRESENT ILLNESS: Patient is a pleasant 70 y.o. female, established patient who presents today to discuss medical problems as listed below:    Assessment/Plan:    Vicenta was seen today for establish care.    Diagnoses and all orders for this visit:    Chronic pain of right ankle  -     Cancel: Referral to Podiatry  -     Referral to Podiatry    Pain in right foot  -     Cancel: Referral to Podiatry  -     DX-FOOT-2- RIGHT; Future  -     Referral to Podiatry    Hyperlipidemia, unspecified hyperlipidemia type  -     CBC WITHOUT DIFFERENTIAL; Future  -     Comp Metabolic Panel; Future  -     Lipid Profile; Future    Psychophysiological insomnia    Asthma without status asthmaticus without complication, unspecified asthma severity, unspecified whether persistent    Age-related osteoporosis without current pathological fracture    Chronic bilateral low back pain without sciatica    Seasonal allergies  -     hydrOXYzine HCl (ATARAX) 10 MG Tab; Take 1 Tablet by mouth 3 times a day as needed for Itching (insomnia).    Encounter to establish care    Pruritus  -     hydrOXYzine HCl (ATARAX) 10 MG Tab; Take 1 Tablet by mouth 3 times a day as needed for Itching (insomnia).    Insomnia, unspecified type  -     hydrOXYzine HCl (ATARAX) 10 MG Tab; Take 1 Tablet by mouth 3 times a day as needed for Itching (insomnia).    Dry scalp  -     Clobetasol Propionate 0.05 % Shampoo; Apply 1 Application topically at bedtime as needed (for dry sculp).    Allergic rhinitis, unspecified seasonality, unspecified trigger  -     fluticasone (FLONASE) 50 MCG/ACT nasal spray; Administer 1 Spray into affected nostril(S) every day.    Constipation, unspecified constipation type    Chronic constipation              Assessment & Plan  1. Chronic right ankle pain:  2.  Pain in right foot  - Patient reports discomfort and pain in the right foot,  especially when walking.  - No previous imaging of the foot noted in patient's history.  - Imaging of the right foot will be ordered to assess the extent of the condition.  - Referral to a podiatrist for further evaluation.    3. Asthma:  Chronic and stable condition.  - Albuterol used as needed for acute symptoms, providing relief.    4. Osteoporosis:  Chronic and stable condition.  - Patient started taking Fosamax two weeks ago and took her first pill last week.  - Recent body scan confirmed minimal osteoporosis.  - Continue taking Fosamax as prescribed.    5.  Seasonal allergies:  6.  Allergic rhinitis  Chronic intermittent conditions.  - Patient experiences itching after being outside in the evening and occasional runny nose.  - Hydroxyzine 10 mg will be prescribed, to be taken up to three times daily, or one tablet in the morning and two tablets in the evening, to alleviate itching and improve sleep quality.  - Prescription for Flonase nasal spray will be provided to manage runny nose.    7. Insomnia:  Chronic intermittent condition.  - Patient reports occasional insomnia, potentially related to current stressors.  - Trial of hydroxyzine 10 mg will be prescribed, to be taken up to three times daily, or one tablet in the morning and two tablets in the evening, to improve sleep quality.    8.  Dry scalp  Chronic and stable condition.  - Patient reports constant itching of the scalp.  - Clobetasol propionate shampoo  will be prescribed to manage itchy scalp.    9.  Establishing care with a new provider.  - Comprehensive blood work will be ordered to assess overall health status.  - Patient had a colonoscopy four years ago and is due for another one in one year.    Follow-up:  - Patient will follow up in 6 to 8 weeks.    History of Present Illness  The patient presents to establish care.    She reports a history of a right foot twist approximately 15 years ago, which has been causing discomfort during prolonged  walking. She is seeking a referral to a podiatrist for further evaluation. She also mentions a recent consultation with a physician who conducted an examination but did not provide a referral to a specialist.    She has no known history of diabetes, prediabetes, or hypercholesterolemia. Her last colonoscopy was performed 4 years ago, during which a polyp was removed. She was advised to repeat the procedure in 5 years.    She has a known diagnosis of asthma and experienced a severe COVID-19 infection 4 years ago, which resulted in hospitalization for 2 weeks. She continues to experience a dry cough. She uses an albuterol inhaler as needed, despite being advised to use it twice daily.    She reports occasional insomnia, which she attributes to current life stressors. She anticipates that this issue will resolve once her stress levels decrease. However, she notes that she tends to wake up even when not under stress.    She experiences intermittent low back pain, which is not chronic in nature.    She reports an itchy scalp and occasional rhinorrhea.    She recently initiated Fosamax therapy for osteoporosis, following a body scan that revealed minimal bone density loss. She took her first dose last week.    She suspects an allergy to an unidentified outdoor allergen, as she experiences itching upon returning from evening walks. This symptom is alleviated by showering.    Health Maintenance:  COMPLETED     Allergies, past medical history, past surgical history, family history, social history reviewed and updated.    Review of Systems   Constitutional:  Negative for chills, fever, malaise/fatigue and weight loss.   HENT:  Negative for congestion, ear discharge, hearing loss, sinus pain and sore throat.    Eyes:  Negative for pain, discharge and redness.   Respiratory:  Negative for cough, sputum production, shortness of breath and wheezing.    Cardiovascular:  Negative for chest pain, palpitations and leg swelling.  "  Gastrointestinal:  Negative for abdominal pain, blood in stool, constipation, diarrhea, nausea and vomiting.   Genitourinary:  Negative for dysuria, flank pain, frequency, hematuria and urgency.   Musculoskeletal:  Negative for back pain, joint pain and myalgias.   Skin:  Negative for itching and rash.   Neurological:  Negative for dizziness, tremors, sensory change, speech change, focal weakness, loss of consciousness, weakness and headaches.   Endo/Heme/Allergies:  Negative for environmental allergies and polydipsia. Does not bruise/bleed easily.   Psychiatric/Behavioral:  Negative for depression. The patient is not nervous/anxious and does not have insomnia.         Exam:    BP 90/60   Pulse 81   Temp 37.1 °C (98.8 °F) (Temporal)   Ht 1.54 m (5' 0.63\")   Wt 63.9 kg (140 lb 12.2 oz)   LMP  (LMP Unknown)   SpO2 96%   BMI 26.92 kg/m²  Body mass index is 26.92 kg/m².    Physical Exam     Results       Discussed with patient possible alternative diagnoses, patient is to take all medications as prescribed.      If symptoms persist FU w/PCP, if symptoms worsen go to emergency room.      If experiencing any side effects from prescribed medications report to the office immediately or go to emergency room.     Reviewed indication, dosage, usage and potential adverse effects of prescribed medications.      Reviewed risks and benefits of treatment plan. Patient verbalizes understanding of all instruction and verbally agrees to plan.     Discussed plan with the patient, and patient agrees to the above.      I personally reviewed prior external notes and test results pertinent to today's visit.      No follow-ups on file. 6-8 wks  "

## 2025-05-12 NOTE — Clinical Note
REFERRAL APPROVAL NOTICE         Sent on May 12, 2025                   Vicenta Saeed  [address]  [address]  [facility] [address]                   Dear Ms. Saeed,    After a careful review of the medical information and benefit coverage, [facility] has processed your referral. See below for additional details.    If applicable, you must be actively enrolled with your insurance for coverage of the authorized service. If you have any questions regarding your coverage, please contact your insurance directly.    REFERRAL INFORMATION   Referral #:  [id number]  Referred-To Provider    Referred-By Provider:  Podiatry    [facility]      [address]  [address]  [place] [address]  [phone] [address]  [address]  [facility] [address]  [phone]    Referral Start Date:  05/09/2025  Referral End Date:   05/09/2026             SCHEDULING  If you do not already have an appointment, please call [phone] to make an appointment.     MORE INFORMATION  If you do not already have a [employer] account, sign up at: [email or web address]  You can access your medical information, make appointments, see lab results, billing information, and more.  If you have questions regarding this referral, please contact  the [facility] Referrals department at:             [phone]. Monday - Friday 8:00AM - 5:00PM.     Sincerely,    [facility]

## 2025-05-31 DIAGNOSIS — J30.2 SEASONAL ALLERGIES: ICD-10-CM

## 2025-05-31 DIAGNOSIS — G47.00 INSOMNIA, UNSPECIFIED TYPE: ICD-10-CM

## 2025-05-31 DIAGNOSIS — L29.9 PRURITUS: ICD-10-CM

## 2025-05-31 DIAGNOSIS — J30.9 ALLERGIC RHINITIS, UNSPECIFIED SEASONALITY, UNSPECIFIED TRIGGER: ICD-10-CM

## 2025-06-03 RX ORDER — FLUTICASONE PROPIONATE 50 MCG
1 SPRAY, SUSPENSION (ML) NASAL DAILY
Qty: 48 ML | Refills: 1 | Status: SHIPPED | OUTPATIENT
Start: 2025-06-03

## 2025-06-03 RX ORDER — HYDROXYZINE HYDROCHLORIDE 10 MG/1
10 TABLET, FILM COATED ORAL 3 TIMES DAILY PRN
Qty: 270 TABLET | Refills: 1 | Status: SHIPPED | OUTPATIENT
Start: 2025-06-03 | End: 2025-06-26 | Stop reason: SDUPTHER

## 2025-06-26 ENCOUNTER — OFFICE VISIT (OUTPATIENT)
Dept: MEDICAL GROUP | Facility: MEDICAL CENTER | Age: 70
End: 2025-06-26
Payer: MEDICARE

## 2025-06-26 ENCOUNTER — PATIENT MESSAGE (OUTPATIENT)
Dept: MEDICAL GROUP | Facility: CLINIC | Age: 70
End: 2025-06-26

## 2025-06-26 VITALS
SYSTOLIC BLOOD PRESSURE: 94 MMHG | OXYGEN SATURATION: 96 % | DIASTOLIC BLOOD PRESSURE: 58 MMHG | HEIGHT: 61 IN | TEMPERATURE: 98.8 F | WEIGHT: 141.2 LBS | BODY MASS INDEX: 26.66 KG/M2 | HEART RATE: 67 BPM

## 2025-06-26 DIAGNOSIS — J45.909 ASTHMA WITHOUT STATUS ASTHMATICUS WITHOUT COMPLICATION, UNSPECIFIED ASTHMA SEVERITY, UNSPECIFIED WHETHER PERSISTENT: ICD-10-CM

## 2025-06-26 DIAGNOSIS — J30.2 SEASONAL ALLERGIES: Primary | ICD-10-CM

## 2025-06-26 DIAGNOSIS — G47.00 INSOMNIA, UNSPECIFIED TYPE: ICD-10-CM

## 2025-06-26 DIAGNOSIS — Z12.11 SCREEN FOR COLON CANCER: ICD-10-CM

## 2025-06-26 DIAGNOSIS — L29.9 PRURITUS: ICD-10-CM

## 2025-06-26 DIAGNOSIS — R23.8 DRY SCALP: ICD-10-CM

## 2025-06-26 PROCEDURE — 99214 OFFICE O/P EST MOD 30 MIN: CPT | Performed by: NURSE PRACTITIONER

## 2025-06-26 PROCEDURE — 3074F SYST BP LT 130 MM HG: CPT | Performed by: NURSE PRACTITIONER

## 2025-06-26 PROCEDURE — 3078F DIAST BP <80 MM HG: CPT | Performed by: NURSE PRACTITIONER

## 2025-06-26 RX ORDER — HYDROXYZINE HYDROCHLORIDE 10 MG/1
10 TABLET, FILM COATED ORAL 3 TIMES DAILY PRN
Qty: 270 TABLET | Refills: 1 | Status: SHIPPED | OUTPATIENT
Start: 2025-06-26

## 2025-06-26 RX ORDER — CLOBETASOL PROPIONATE 0.05 G/100ML
1 SHAMPOO TOPICAL NIGHTLY PRN
Qty: 118 ML | Refills: 1 | Status: SHIPPED | OUTPATIENT
Start: 2025-06-26

## 2025-06-26 RX ORDER — MONTELUKAST SODIUM 10 MG/1
10 TABLET ORAL DAILY
Qty: 30 TABLET | Refills: 3 | Status: SHIPPED | OUTPATIENT
Start: 2025-06-26

## 2025-06-26 ASSESSMENT — ENCOUNTER SYMPTOMS
CHILLS: 0
FEVER: 0
PALPITATIONS: 0

## 2025-07-18 DIAGNOSIS — J30.2 SEASONAL ALLERGIES: ICD-10-CM

## 2025-07-18 DIAGNOSIS — J45.909 ASTHMA WITHOUT STATUS ASTHMATICUS WITHOUT COMPLICATION, UNSPECIFIED ASTHMA SEVERITY, UNSPECIFIED WHETHER PERSISTENT: ICD-10-CM

## 2025-07-18 RX ORDER — MONTELUKAST SODIUM 10 MG/1
10 TABLET ORAL DAILY
Qty: 90 TABLET | Refills: 2 | Status: SHIPPED | OUTPATIENT
Start: 2025-07-18